# Patient Record
Sex: MALE | Race: WHITE | Employment: FULL TIME | URBAN - METROPOLITAN AREA
[De-identification: names, ages, dates, MRNs, and addresses within clinical notes are randomized per-mention and may not be internally consistent; named-entity substitution may affect disease eponyms.]

---

## 2022-01-11 ENCOUNTER — HOSPITAL ENCOUNTER (OUTPATIENT)
Dept: GENERAL RADIOLOGY | Age: 32
Discharge: HOME | End: 2022-01-13
Payer: COMMERCIAL

## 2022-01-11 ENCOUNTER — HOSPITAL ENCOUNTER (OUTPATIENT)
Age: 32
Discharge: HOME | End: 2022-01-13
Payer: COMMERCIAL

## 2022-01-11 DIAGNOSIS — R07.9 CHEST PAIN, UNSPECIFIED TYPE: ICD-10-CM

## 2022-01-11 PROCEDURE — 71046 X-RAY EXAM CHEST 2 VIEWS: CPT

## 2022-01-25 ENCOUNTER — HOSPITAL ENCOUNTER (EMERGENCY)
Age: 32
Discharge: HOME | End: 2022-01-25
Payer: COMMERCIAL

## 2022-01-25 ENCOUNTER — APPOINTMENT (OUTPATIENT)
Dept: CT IMAGING | Age: 32
End: 2022-01-25
Payer: COMMERCIAL

## 2022-01-25 ENCOUNTER — APPOINTMENT (OUTPATIENT)
Dept: ULTRASOUND IMAGING | Age: 32
End: 2022-01-25
Payer: COMMERCIAL

## 2022-01-25 VITALS
SYSTOLIC BLOOD PRESSURE: 144 MMHG | WEIGHT: 287 LBS | HEIGHT: 74 IN | OXYGEN SATURATION: 98 % | DIASTOLIC BLOOD PRESSURE: 92 MMHG | RESPIRATION RATE: 16 BRPM | TEMPERATURE: 97.5 F | BODY MASS INDEX: 36.83 KG/M2 | HEART RATE: 74 BPM

## 2022-01-25 DIAGNOSIS — R74.01 ELEVATED ALT MEASUREMENT: ICD-10-CM

## 2022-01-25 DIAGNOSIS — R07.81 RIB PAIN ON RIGHT SIDE: Primary | ICD-10-CM

## 2022-01-25 DIAGNOSIS — R03.0 ELEVATED BLOOD PRESSURE READING: ICD-10-CM

## 2022-01-25 DIAGNOSIS — K76.0 FATTY LIVER: ICD-10-CM

## 2022-01-25 DIAGNOSIS — R21 RASH AND OTHER NONSPECIFIC SKIN ERUPTION: ICD-10-CM

## 2022-01-25 LAB
ALBUMIN SERPL-MCNC: 4.4 G/DL (ref 3.5–5.2)
ALP BLD-CCNC: 106 U/L (ref 40–129)
ALT SERPL-CCNC: 44 U/L (ref 0–40)
ANION GAP SERPL CALCULATED.3IONS-SCNC: 11 MMOL/L (ref 7–16)
AST SERPL-CCNC: 23 U/L (ref 0–39)
BASOPHILS ABSOLUTE: 0.07 E9/L (ref 0–0.2)
BASOPHILS RELATIVE PERCENT: 0.7 % (ref 0–2)
BILIRUB SERPL-MCNC: 0.3 MG/DL (ref 0–1.2)
BILIRUBIN DIRECT: <0.2 MG/DL (ref 0–0.3)
BILIRUBIN URINE: NEGATIVE
BILIRUBIN, INDIRECT: ABNORMAL MG/DL (ref 0–1)
BLOOD, URINE: NEGATIVE
BUN BLDV-MCNC: 19 MG/DL (ref 6–20)
CALCIUM SERPL-MCNC: 9.3 MG/DL (ref 8.6–10.2)
CHLORIDE BLD-SCNC: 103 MMOL/L (ref 98–107)
CLARITY: CLEAR
CO2: 25 MMOL/L (ref 22–29)
COLOR: YELLOW
CREAT SERPL-MCNC: 0.8 MG/DL (ref 0.7–1.2)
D DIMER: <200 NG/ML DDU
EKG ATRIAL RATE: 76 BPM
EKG P AXIS: 68 DEGREES
EKG P-R INTERVAL: 156 MS
EKG Q-T INTERVAL: 356 MS
EKG QRS DURATION: 74 MS
EKG QTC CALCULATION (BAZETT): 400 MS
EKG R AXIS: 65 DEGREES
EKG T AXIS: 78 DEGREES
EKG VENTRICULAR RATE: 76 BPM
EOSINOPHILS ABSOLUTE: 0.15 E9/L (ref 0.05–0.5)
EOSINOPHILS RELATIVE PERCENT: 1.5 % (ref 0–6)
GFR AFRICAN AMERICAN: >60
GFR NON-AFRICAN AMERICAN: >60 ML/MIN/1.73
GLUCOSE BLD-MCNC: 89 MG/DL (ref 74–99)
GLUCOSE URINE: NEGATIVE MG/DL
HCT VFR BLD CALC: 51.1 % (ref 37–54)
HEMOGLOBIN: 17.2 G/DL (ref 12.5–16.5)
IMMATURE GRANULOCYTES #: 0.06 E9/L
IMMATURE GRANULOCYTES %: 0.6 % (ref 0–5)
KETONES, URINE: NEGATIVE MG/DL
LACTIC ACID: 1.1 MMOL/L (ref 0.5–2.2)
LEUKOCYTE ESTERASE, URINE: NEGATIVE
LIPASE: 31 U/L (ref 13–60)
LYMPHOCYTES ABSOLUTE: 3.74 E9/L (ref 1.5–4)
LYMPHOCYTES RELATIVE PERCENT: 37.3 % (ref 20–42)
MCH RBC QN AUTO: 30.7 PG (ref 26–35)
MCHC RBC AUTO-ENTMCNC: 33.7 % (ref 32–34.5)
MCV RBC AUTO: 91.1 FL (ref 80–99.9)
MONOCYTES ABSOLUTE: 0.81 E9/L (ref 0.1–0.95)
MONOCYTES RELATIVE PERCENT: 8.1 % (ref 2–12)
NEUTROPHILS ABSOLUTE: 5.2 E9/L (ref 1.8–7.3)
NEUTROPHILS RELATIVE PERCENT: 51.8 % (ref 43–80)
NITRITE, URINE: NEGATIVE
PDW BLD-RTO: 12.3 FL (ref 11.5–15)
PH UA: 5.5 (ref 5–9)
PLATELET # BLD: 257 E9/L (ref 130–450)
PMV BLD AUTO: 10.3 FL (ref 7–12)
POTASSIUM REFLEX MAGNESIUM: 4.3 MMOL/L (ref 3.5–5)
PRO-BNP: 7 PG/ML (ref 0–125)
PROTEIN UA: NEGATIVE MG/DL
RBC # BLD: 5.61 E12/L (ref 3.8–5.8)
REASON FOR REJECTION: NORMAL
REJECTED TEST: NORMAL
SODIUM BLD-SCNC: 139 MMOL/L (ref 132–146)
SPECIFIC GRAVITY UA: >=1.03 (ref 1–1.03)
TOTAL PROTEIN: 7 G/DL (ref 6.4–8.3)
TROPONIN, HIGH SENSITIVITY: <6 NG/L (ref 0–11)
UROBILINOGEN, URINE: 0.2 E.U./DL
WBC # BLD: 10 E9/L (ref 4.5–11.5)

## 2022-01-25 PROCEDURE — 83605 ASSAY OF LACTIC ACID: CPT

## 2022-01-25 PROCEDURE — 81003 URINALYSIS AUTO W/O SCOPE: CPT

## 2022-01-25 PROCEDURE — 76705 ECHO EXAM OF ABDOMEN: CPT

## 2022-01-25 PROCEDURE — 84484 ASSAY OF TROPONIN QUANT: CPT

## 2022-01-25 PROCEDURE — 93005 ELECTROCARDIOGRAM TRACING: CPT | Performed by: PHYSICIAN ASSISTANT

## 2022-01-25 PROCEDURE — 80076 HEPATIC FUNCTION PANEL: CPT

## 2022-01-25 PROCEDURE — 36415 COLL VENOUS BLD VENIPUNCTURE: CPT

## 2022-01-25 PROCEDURE — 85378 FIBRIN DEGRADE SEMIQUANT: CPT

## 2022-01-25 PROCEDURE — 83880 ASSAY OF NATRIURETIC PEPTIDE: CPT

## 2022-01-25 PROCEDURE — 80048 BASIC METABOLIC PNL TOTAL CA: CPT

## 2022-01-25 PROCEDURE — 83690 ASSAY OF LIPASE: CPT

## 2022-01-25 PROCEDURE — 99284 EMERGENCY DEPT VISIT MOD MDM: CPT

## 2022-01-25 PROCEDURE — 85025 COMPLETE CBC W/AUTO DIFF WBC: CPT

## 2022-01-25 PROCEDURE — 71250 CT THORAX DX C-: CPT

## 2022-01-25 RX ORDER — NAPROXEN 500 MG/1
500 TABLET ORAL 2 TIMES DAILY
Qty: 14 TABLET | Refills: 0 | Status: SHIPPED | OUTPATIENT
Start: 2022-01-25 | End: 2022-02-01

## 2022-01-25 RX ORDER — ALPRAZOLAM 0.5 MG/1
0.5 TABLET ORAL NIGHTLY PRN
COMMUNITY

## 2022-01-25 RX ORDER — DEXTROAMPHETAMINE SACCHARATE, AMPHETAMINE ASPARTATE, DEXTROAMPHETAMINE SULFATE AND AMPHETAMINE SULFATE 7.5; 7.5; 7.5; 7.5 MG/1; MG/1; MG/1; MG/1
30 TABLET ORAL DAILY
COMMUNITY

## 2022-01-25 ASSESSMENT — PAIN DESCRIPTION - LOCATION: LOCATION: RIB CAGE

## 2022-01-25 ASSESSMENT — PAIN DESCRIPTION - PAIN TYPE: TYPE: ACUTE PAIN

## 2022-01-25 ASSESSMENT — PAIN SCALES - GENERAL: PAINLEVEL_OUTOF10: 3

## 2022-01-25 ASSESSMENT — PAIN DESCRIPTION - ORIENTATION: ORIENTATION: RIGHT

## 2022-01-25 NOTE — ED PROVIDER NOTES
[facility]  Department of Emergency Medicine   ED  Encounter Note  Admit Date/RoomTime: 2022 11:08 AM  ED Room: 37/37    NAME: Teresa Elena  : 1990  MRN: [id number]     Chief Complaint:  Other (pain rt lateral rib cage worse with inspiration )    History of Present Illness        Teresa Elena is a 27 y.o. old male who presents to the emergency department by private vehicle, for non-traumatic aching, dull and sharp right lateral chest pain which occured 1 month(s) prior to arrival. The complaint occurred as a result of no known cause. He denies any fall or trauma. He states that 1 month ago he had some coughing and shortness of breath. He is not vaccinated against Covid and did not get tested for Covid at that time. He denies having Covid within the past 2 years. He states that since then the shortness of breath and cough have improved but now he has right-sided rib pain. He also notes a month ago he was possibly exposed to mold/hydrochloric acid inhalation at work but has not been exposed since then. He states he went to his family doctor who did a chest x-ray and told him that that was normal. He notes that he is here today because he is having right-sided rib pain that is both aching and sharp. He states that it hurts more at the end of the day. He denies any shortness of breath with this, hemoptysis, cough, fever, chills, or chest pain. Patient has no prior history of pain/injury with regards to today's visit. Since onset the symptoms have been remaining constant. His symptoms are associated with nothing additional at this time. His pain is aggraveated by movement, inspiration, and palpation and relieved by pressure on the area.   He denies any head injury, loss of consciousness, vision changes, eye pain, headache, neck pain, chest pain, SOB, hemoptysis, extremity injury or pains, numbness, weakness, fever, chills, cough, dyspnea, nausea, vomiting, diarrhea, constipation, dysuria, urinary frequency, urinary urgency, hematuria, or other sxs. Denies recent travel, surgery, calf pain or swelling, history of blood clots, personal history of cancer, or hormone replacement therapy. He notes that he is a smoker and smokes a pack a week. He also notes that he has asthma and uses an inhaler as needed but has not had to use it more recently. He denies high blood pressure, high cholesterol, diabetes, personal history of heart problems, or family history of heart problems. He denies a problem with his gallbladder. Patient also notes that he had a rash to the right side of his nose and now it is on the left side for one month. He states the one on the R nostril went away completely. He states that it is just one area in the shape of a pimple to the L nostril. He states that for both the pimple and the coughing his doctor has given him prednisone, mupirocin for the rash, and then on revisit to his physician he was given Valtrex as the doctor thought maybe it was shingles. He states that the cough and shortness of breath have improved and that the pimple went from the right side to now the left nostril. He states that it is slowly getting better. He states that it does hurt occasionally when you touch it but denies any rash anywhere else. He denies fever, chills, oral lesions, sore throat, ear pain, eye pain, or other symptoms. He denies any changes in medications or soaps or detergents. HISTORY: 0  EC  AGE: 1  RISK FACTORS: 1  TROPONIN: 0    TOTAL SCORE: 1    PERC Rule for PE for Age <50:      Age ?  50 negative     HR ? 100 negative     O2 Sat on Room Air < 95% negative     Prior History of DVT/PE negative     Recent Trauma or Surgery negative     Hemoptysis negative     Exogenous Estrogen/Hormone Use negative     Unilateral Extgremity Swelling negative     * If ANY Criteria are positive, the PERC rule is not satisfied and cannot be used to rule out PE in this patient. ROS   Pertinent positives and negatives are stated within HPI, all other systems reviewed and are negative. Past Medical History:  has a past medical history of Asthma. Surgical History:  has no past surgical history on file. Social History:  reports that he has been smoking. He has been smoking about 0.50 packs per day. He has never used smokeless tobacco. He reports that he does not drink alcohol and does not use drugs. Family History: family history is not on file. Allergies: Patient has no known allergies. Physical Exam   Oxygen Saturation Interpretation: Normal.        ED Triage Vitals [01/25/22 1007]   BP Temp Temp Source Pulse Resp SpO2 Height Weight   (!) 144/82 97.2 °F (36.2 °C) Temporal 96 14 97 % 6' 2\" (1.88 m) 287 lb (130.2 kg)       Vitals:    01/25/22 1007 01/25/22 1409   BP: (!) 144/82 (!) 144/92   Pulse: 96 74   Resp: 14 16   Temp: 97.2 °F (36.2 °C) 97.5 °F (36.4 °C)   TempSrc: Temporal Oral   SpO2: 97% 98%   Weight: 287 lb (130.2 kg)    Height: 6' 2\" (1.88 m)      Physical Exam  Constitutional:  Alert, development consistent with age. Head:  NC/NT. No maxillary or frontal sinus tenderness  Ears: external ear canals without erythema or swelling; TMs with good light reflex and no bleeding, bulging, or retractions bilaterally; no mastoid tenderness  Eyes: EOMI bilaterally; PERRLA; conjunctiva clear bilaterally  Nose: small, 3 mm papule to outer edge of L nostril without drainage, vesicle, erythema, increased warmth, swelling, fluctuance, crepitus; no other lesions or rash; normal nasal exam otherwise; no other abnormalities; no rhinorrhea; no bleeding  Throat: posterior pharynx without erythema; tonsils without erythema, swelling, or exudate; Airway patent; no oral lesions  Neck:  Normal ROM. Supple.  No cervical lymphadenopathy  Respiratory:  Clear to auscultation and breath sounds equal. No wheezes, rhonchi, stridor; not in respiratory distress  CV:  Regular rate and rhythm, normal heart sounds, without pathological murmurs, ectopy, gallops, or rubs. Chest:  right sided tender to palpation, which does reproduce pain. Crepitance: No.          Skin:  no wounds, erythema, or swelling. GI: Mild tenderness to palpation to the right upper quadrant; otherwise no tenderness to palpation in the abdomen; abdomen Soft, good bowel sounds. No firm or pulsatile mass. No rebound tenderness, guarding, rigidity  Integument:  Normal turgor. Warm, dry, without visible rash, unless noted elsewhere. Extremities: No tenderness or edema noted. No calf pain or swelling bilaterally  Neurological:  Oriented. Motor functions intact.     Lab / Imaging Results   (All laboratory and radiology results have been personally reviewed by myself)  Labs:  Results for orders placed or performed during the hospital encounter of 01/25/22   Troponin   Result Value Ref Range    Troponin, High Sensitivity <6 0 - 11 ng/L   CBC Auto Differential   Result Value Ref Range    WBC 10.0 4.5 - 11.5 E9/L    RBC 5.61 3.80 - 5.80 E12/L    Hemoglobin 17.2 (H) 12.5 - 16.5 g/dL    Hematocrit 51.1 37.0 - 54.0 %    MCV 91.1 80.0 - 99.9 fL    MCH 30.7 26.0 - 35.0 pg    MCHC 33.7 32.0 - 34.5 %    RDW 12.3 11.5 - 15.0 fL    Platelets 728 063 - 255 E9/L    MPV 10.3 7.0 - 12.0 fL    Neutrophils % 51.8 43.0 - 80.0 %    Immature Granulocytes % 0.6 0.0 - 5.0 %    Lymphocytes % 37.3 20.0 - 42.0 %    Monocytes % 8.1 2.0 - 12.0 %    Eosinophils % 1.5 0.0 - 6.0 %    Basophils % 0.7 0.0 - 2.0 %    Neutrophils Absolute 5.20 1.80 - 7.30 E9/L    Immature Granulocytes # 0.06 E9/L    Lymphocytes Absolute 3.74 1.50 - 4.00 E9/L    Monocytes Absolute 0.81 0.10 - 0.95 E9/L    Eosinophils Absolute 0.15 0.05 - 0.50 E9/L    Basophils Absolute 0.07 0.00 - 0.20 E9/L   Lipase   Result Value Ref Range    Lipase 31 13 - 60 U/L   Lactic Acid, Plasma   Result Value Ref Range    Lactic Acid 1.1 0.5 - 2.2 mmol/L   Basic Metabolic Panel w/ Reflex to MG Result Value Ref Range    Sodium 139 132 - 146 mmol/L    Potassium reflex Magnesium 4.3 3.5 - 5.0 mmol/L    Chloride 103 98 - 107 mmol/L    CO2 25 22 - 29 mmol/L    Anion Gap 11 7 - 16 mmol/L    Glucose 89 74 - 99 mg/dL    BUN 19 6 - 20 mg/dL    CREATININE 0.8 0.7 - 1.2 mg/dL    GFR Non-African American >60 >=60 mL/min/1.73    GFR African American >60     Calcium 9.3 8.6 - 10.2 mg/dL   Hepatic Function Panel   Result Value Ref Range    Total Protein 7.0 6.4 - 8.3 g/dL    Albumin 4.4 3.5 - 5.2 g/dL    Alkaline Phosphatase 106 40 - 129 U/L    ALT 44 (H) 0 - 40 U/L    AST 23 0 - 39 U/L    Total Bilirubin 0.3 0.0 - 1.2 mg/dL    Bilirubin, Direct <0.2 0.0 - 0.3 mg/dL    Bilirubin, Indirect see below 0.0 - 1.0 mg/dL   Urinalysis, reflex to microscopic   Result Value Ref Range    Color, UA Yellow Straw/Yellow    Clarity, UA Clear Clear    Glucose, Ur Negative Negative mg/dL    Bilirubin Urine Negative Negative    Ketones, Urine Negative Negative mg/dL    Specific Gravity, UA >=1.030 1.005 - 1.030    Blood, Urine Negative Negative    pH, UA 5.5 5.0 - 9.0    Protein, UA Negative Negative mg/dL    Urobilinogen, Urine 0.2 <2.0 E.U./dL    Nitrite, Urine Negative Negative    Leukocyte Esterase, Urine Negative Negative   Brain Natriuretic Peptide   Result Value Ref Range    Pro-BNP 7 0 - 125 pg/mL   SPECIMEN REJECTION   Result Value Ref Range    Rejected Test DIMER     Reason for Rejection see below    D-dimer, quantitative   Result Value Ref Range    D-Dimer, Quant <200 ng/mL DDU   EKG 12 Lead   Result Value Ref Range    Ventricular Rate 76 BPM    Atrial Rate 76 BPM    P-R Interval 156 ms    QRS Duration 74 ms    Q-T Interval 356 ms    QTc Calculation (Bazett) 400 ms    P Axis 68 degrees    R Axis 65 degrees    T Axis 78 degrees     Imaging: All Radiology results interpreted by Radiologist unless otherwise noted. US GALLBLADDER RUQ   Final Result   Hepatic steatosis with hepatomegaly.       Otherwise negative exam.         CT CHEST WO CONTRAST   Final Result   1. No pneumonia or pleural effusion. 2. Hepatic steatosis. EKG: This EKG is signed and interpreted by Dr. Austin Savage. Rate: 76  Rhythm: Sinus  Interpretation: no acute changes; QTc 400 ms  Comparison: no previous EKG available    ED Course / Medical Decision Making   Medications - No data to display    Consult(s):   None    Procedure(s):   none    MDM: Patient presents for right-sided rib pain that hurts with inspiration and palpation. It is reproducible. Had a cough and shortness of breath 4 weeks ago which have since cleared up. Did not get tested for Covid and is not vaccinated. States he inhaled possibly mold vs HCl acid 1 month ago at work but has not inhaled it since. Has asthma but no longer SOB. No fall or trauma. Also having some right upper quadrant pain. Labs and imaging ordered and reviewed above to rule out blood clot versus gallbladder problem. Ultrasound of the gallbladder within normal limits. Minimally elevated ALT with hepatomegaly. We will have him follow-up with his PCP for this. Troponin and EKG within normal limits. Heart score 1. Dimer less than 200. CAT scan of the chest negative. Possibly just musculoskeletal from either an injury with lifting at work or coughing frequently 1 month ago. Will send home with Naprosyn for this. Vitals are within normal limits. He is not short of breath or having any other symptoms besides the pain when you push on the chest.  Patient re-evaluated prior to discharge. Non-surgical abdomen upon re-examination. No guarding, rigidity, or rebound tenderness. Also has a pimple to the left nostril. States that it is getting better. Was treated with mupirocin, steroids, and Valtrex. No systemic symptoms. Vitals within normal limits. Does not appear to be shingles. Pt states had one on his R nostril and that went away and now on his L which is not consistent with shingles. No eye pain or eye involvement.  No other rash. Looks almost just like a pimple. Has had this for a month. States it is getting better. We will have him follow-up with a dermatologist for this as he states he does have a dermatologist to follow-up with. We will have him follow-up with his family doctor for the right-sided rib pain and dermatology for the papule. All questions answered. Patient agreeable with the plan. Patient is in no acute distress, non-toxic, and appropriate for outpatient management. Pt educated on reasons to return to the ER, including need to return for new or worsening symptoms. He did not want any pain meds in the ER. Plan of Care/Counseling:  EDMOND VORA PA-C reviewed today's visit with the patient in addition to providing specific details for the plan of care and counseling regarding the diagnosis and prognosis. Questions are answered at this time and are agreeable with the plan. Assessment     1. Rib pain on right side    2. Elevated ALT measurement    3. Fatty liver    4. Rash and other nonspecific skin eruption    5. Elevated blood pressure reading      Plan   Discharged home. Patient condition is good    New Medications     Discharge Medication List as of 1/25/2022  2:15 PM      START taking these medications    Details   naproxen (NAPROSYN) 500 MG tablet Take 1 tablet by mouth 2 times daily for 7 days, Disp-14 tablet, R-0Print           Electronically signed by Raf Biggs PA-C   DD: 1/25/22  **This report was transcribed using voice recognition software. Every effort was made to ensure accuracy; however, inadvertent computerized transcription errors may be present.   END OF ED PROVIDER NOTE       Hakan Cash PA-C  01/25/22 8375

## 2022-01-25 NOTE — Clinical Note
Jace Thao was seen and treated in our emergency department on 1/25/2022. He may return to work on 01/28/2022. If you have any questions or concerns, please don't hesitate to call.       Shahrzad Keene PA-C

## 2022-04-26 ENCOUNTER — HOSPITAL ENCOUNTER (OUTPATIENT)
Age: 32
Discharge: HOME | End: 2022-04-28
Payer: COMMERCIAL

## 2022-04-26 ENCOUNTER — HOSPITAL ENCOUNTER (OUTPATIENT)
Dept: GENERAL RADIOLOGY | Age: 32
Discharge: HOME | End: 2022-04-28
Payer: COMMERCIAL

## 2022-04-26 DIAGNOSIS — R07.9 CHEST PAIN, UNSPECIFIED TYPE: ICD-10-CM

## 2022-04-26 PROCEDURE — 71101 X-RAY EXAM UNILAT RIBS/CHEST: CPT

## 2023-02-02 ENCOUNTER — HOSPITAL ENCOUNTER (OUTPATIENT)
Dept: MRI IMAGING | Age: 33
Discharge: HOME | End: 2023-02-04
Payer: COMMERCIAL

## 2023-02-02 ENCOUNTER — HOSPITAL ENCOUNTER (OUTPATIENT)
Dept: GENERAL RADIOLOGY | Age: 33
Discharge: HOME | End: 2023-02-04
Payer: COMMERCIAL

## 2023-02-02 DIAGNOSIS — S05.40XA RETROBULBAR FOREIGN BODY, UNSPECIFIED LATERALITY: ICD-10-CM

## 2023-02-02 DIAGNOSIS — R42 DIZZINESS AND GIDDINESS: ICD-10-CM

## 2023-02-02 DIAGNOSIS — J06.9 ACUTE URI: ICD-10-CM

## 2023-02-02 DIAGNOSIS — R51.9 NONINTRACTABLE HEADACHE, UNSPECIFIED CHRONICITY PATTERN, UNSPECIFIED HEADACHE TYPE: ICD-10-CM

## 2023-02-02 PROCEDURE — 70030 X-RAY EYE FOR FOREIGN BODY: CPT

## 2023-02-02 PROCEDURE — 70551 MRI BRAIN STEM W/O DYE: CPT

## 2023-08-11 ENCOUNTER — HOSPITAL ENCOUNTER (OUTPATIENT)
Dept: GENERAL RADIOLOGY | Age: 33
Discharge: HOME | End: 2023-08-11
Payer: COMMERCIAL

## 2023-08-11 DIAGNOSIS — M79.671 RIGHT FOOT PAIN: ICD-10-CM

## 2023-08-11 PROCEDURE — 73630 X-RAY EXAM OF FOOT: CPT

## 2024-08-23 ENCOUNTER — APPOINTMENT (OUTPATIENT)
Dept: GENERAL RADIOLOGY | Age: 34
DRG: 511 | End: 2024-08-23
Payer: COMMERCIAL

## 2024-08-23 ENCOUNTER — ANESTHESIA (OUTPATIENT)
Dept: OPERATING ROOM | Age: 34
End: 2024-08-23
Payer: COMMERCIAL

## 2024-08-23 ENCOUNTER — ANESTHESIA EVENT (OUTPATIENT)
Dept: OPERATING ROOM | Age: 34
End: 2024-08-23
Payer: COMMERCIAL

## 2024-08-23 ENCOUNTER — APPOINTMENT (OUTPATIENT)
Dept: CT IMAGING | Age: 34
DRG: 511 | End: 2024-08-23
Payer: COMMERCIAL

## 2024-08-23 ENCOUNTER — HOSPITAL ENCOUNTER (INPATIENT)
Age: 34
LOS: 11 days | Discharge: HOME HEALTH | DRG: 511 | End: 2024-09-03
Attending: STUDENT IN AN ORGANIZED HEALTH CARE EDUCATION/TRAINING PROGRAM | Admitting: SURGERY
Payer: COMMERCIAL

## 2024-08-23 DIAGNOSIS — S52.501A CLOSED FRACTURE OF DISTAL END OF RIGHT RADIUS, UNSPECIFIED FRACTURE MORPHOLOGY, INITIAL ENCOUNTER: ICD-10-CM
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
## 2024-08-23 DIAGNOSIS — I87.8 POOR VENOUS ACCESS: ICD-10-CM

## 2024-08-23 DIAGNOSIS — S22.49XS CLOSED FRACTURE OF MULTIPLE RIBS, UNSPECIFIED LATERALITY, SEQUELA: ICD-10-CM

## 2024-08-23 DIAGNOSIS — S52.502C TYPE III OPEN FRACTURE OF DISTAL END OF LEFT RADIUS, UNSPECIFIED FRACTURE MORPHOLOGY, INITIAL ENCOUNTER: Primary | ICD-10-CM

## 2024-08-23 DIAGNOSIS — S42.401A CLOSED FRACTURE OF RIGHT ELBOW, INITIAL ENCOUNTER: ICD-10-CM
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
## 2024-08-23 PROBLEM — S52.021A CLOSED FRACTURE OF RIGHT OLECRANON PROCESS: Status: ACTIVE | Noted: 2024-08-23

## 2024-08-23 PROBLEM — S52.509B OPEN FRACTURE OF DISTAL END OF RADIUS: Status: ACTIVE | Noted: 2024-08-23

## 2024-08-23 LAB
AADO2: 372.2 MMHG
ABO + RH BLD: NORMAL
ALBUMIN SERPL-MCNC: 3.9 G/DL (ref 3.5–5.2)
ALP SERPL-CCNC: 110 U/L (ref 40–129)
ALT SERPL-CCNC: 839 U/L (ref 0–40)
ANION GAP SERPL CALCULATED.3IONS-SCNC: 15 MMOL/L (ref 7–16)
APAP SERPL-MCNC: <5 UG/ML (ref 10–30)
ARM BAND NUMBER: NORMAL
AST SERPL-CCNC: 880 U/L (ref 0–39)
B.E.: -2.8 MMOL/L (ref -3–3)
BASOPHILS # BLD: 0.06 K/UL (ref 0–0.2)
BASOPHILS NFR BLD: 1 % (ref 0–2)
BILIRUB SERPL-MCNC: 0.6 MG/DL (ref 0–1.2)
BLOOD BANK SAMPLE EXPIRATION: NORMAL
BLOOD GROUP ANTIBODIES SERPL: NEGATIVE
BUN SERPL-MCNC: 19 MG/DL (ref 6–20)
CALCIUM SERPL-MCNC: 8.9 MG/DL (ref 8.6–10.2)
CHLORIDE SERPL-SCNC: 107 MMOL/L (ref 98–107)
CO2 SERPL-SCNC: 20 MMOL/L (ref 22–29)
COHB: 0.3 % (ref 0–1.5)
CREAT SERPL-MCNC: 1.1 MG/DL (ref 0.7–1.2)
CRITICAL: ABNORMAL
DATE ANALYZED: ABNORMAL
DATE OF COLLECTION: ABNORMAL
EOSINOPHIL # BLD: 0.09 K/UL (ref 0.05–0.5)
EOSINOPHILS RELATIVE PERCENT: 1 % (ref 0–6)
ERYTHROCYTE [DISTWIDTH] IN BLOOD BY AUTOMATED COUNT: 12.4 % (ref 11.5–15)
ETHANOLAMINE SERPL-MCNC: <10 MG/DL (ref 0–0.08)
FIO2: 100 %
GFR, ESTIMATED: 89 ML/MIN/1.73M2
GLUCOSE SERPL-MCNC: 141 MG/DL (ref 74–99)
HCO3: 22.2 MMOL/L (ref 22–26)
HCT VFR BLD AUTO: 45.2 % (ref 37–54)
HGB BLD-MCNC: 15 G/DL (ref 12.5–16.5)
HHB: 0.6 % (ref 0–5)
IMM GRANULOCYTES # BLD AUTO: 0.1 K/UL (ref 0–0.58)
IMM GRANULOCYTES NFR BLD: 1 % (ref 0–5)
INR PPP: 1
LAB: ABNORMAL
LACTATE BLDV-SCNC: 2.8 MMOL/L (ref 0.5–2.2)
LYMPHOCYTES NFR BLD: 2.74 K/UL (ref 1.5–4)
LYMPHOCYTES RELATIVE PERCENT: 29 % (ref 20–42)
Lab: 2100
MCH RBC QN AUTO: 29.9 PG (ref 26–35)
MCHC RBC AUTO-ENTMCNC: 33.2 G/DL (ref 32–34.5)
MCV RBC AUTO: 90 FL (ref 80–99.9)
METHB: 0.4 % (ref 0–1.5)
MODE: ABNORMAL
MONOCYTES NFR BLD: 0.4 K/UL (ref 0.1–0.95)
MONOCYTES NFR BLD: 4 % (ref 2–12)
NEUTROPHILS NFR BLD: 65 % (ref 43–80)
NEUTS SEG NFR BLD: 6.18 K/UL (ref 1.8–7.3)
O2 SATURATION: 99.4 % (ref 92–98.5)
O2HB: 98.7 % (ref 94–97)
OPERATOR ID: 8214
PARTIAL THROMBOPLASTIN TIME: 21.6 SEC (ref 24.5–35.1)
PATIENT TEMP: 37 C
PCO2: 39.7 MMHG (ref 35–45)
PFO2: 2.76 MMHG/%
PH BLOOD GAS: 7.37 (ref 7.35–7.45)
PLATELET # BLD AUTO: 280 K/UL (ref 130–450)
PMV BLD AUTO: 10.8 FL (ref 7–12)
PO2: 276.1 MMHG (ref 75–100)
POTASSIUM SERPL-SCNC: 4.24 MMOL/L (ref 3.5–5)
POTASSIUM SERPL-SCNC: 4.4 MMOL/L (ref 3.5–5)
PROT SERPL-MCNC: 6.7 G/DL (ref 6.4–8.3)
PROTHROMBIN TIME: 10.4 SEC (ref 9.3–12.4)
RBC # BLD AUTO: 5.02 M/UL (ref 3.8–5.8)
RI(T): 1.35
SALICYLATES SERPL-MCNC: <0.3 MG/DL (ref 0–30)
SODIUM SERPL-SCNC: 142 MMOL/L (ref 132–146)
SOURCE, BLOOD GAS: ABNORMAL
THB: 16 G/DL (ref 11.5–16.5)
TIME ANALYZED: 2101
TOXIC TRICYCLIC SC,BLOOD: NEGATIVE
WBC OTHER # BLD: 9.6 K/UL (ref 4.5–11.5)

## 2024-08-23 PROCEDURE — 71260 CT THORAX DX C+: CPT

## 2024-08-23 PROCEDURE — 36620 INSERTION CATHETER ARTERY: CPT | Performed by: SURGERY

## 2024-08-23 PROCEDURE — 02HV33Z INSERTION OF INFUSION DEVICE INTO SUPERIOR VENA CAVA, PERCUTANEOUS APPROACH: ICD-10-PCS | Performed by: SURGERY

## 2024-08-23 PROCEDURE — 20690 APPL UNIPLN UNI EXT FIXJ SYS: CPT | Performed by: STUDENT IN AN ORGANIZED HEALTH CARE EDUCATION/TRAINING PROGRAM

## 2024-08-23 PROCEDURE — 80179 DRUG ASSAY SALICYLATE: CPT

## 2024-08-23 PROCEDURE — 36556 INSERT NON-TUNNEL CV CATH: CPT | Performed by: SURGERY

## 2024-08-23 PROCEDURE — 73100 X-RAY EXAM OF WRIST: CPT

## 2024-08-23 PROCEDURE — 82805 BLOOD GASES W/O2 SATURATION: CPT

## 2024-08-23 PROCEDURE — 72125 CT NECK SPINE W/O DYE: CPT

## 2024-08-23 PROCEDURE — 3700000000 HC ANESTHESIA ATTENDED CARE: Performed by: STUDENT IN AN ORGANIZED HEALTH CARE EDUCATION/TRAINING PROGRAM

## 2024-08-23 PROCEDURE — 85025 COMPLETE CBC W/AUTO DIFF WBC: CPT

## 2024-08-23 PROCEDURE — 0PSJ35Z REPOSITION LEFT RADIUS WITH EXTERNAL FIXATION DEVICE, PERCUTANEOUS APPROACH: ICD-10-PCS | Performed by: STUDENT IN AN ORGANIZED HEALTH CARE EDUCATION/TRAINING PROGRAM

## 2024-08-23 PROCEDURE — 3600000014 HC SURGERY LEVEL 4 ADDTL 15MIN: Performed by: STUDENT IN AN ORGANIZED HEALTH CARE EDUCATION/TRAINING PROGRAM

## 2024-08-23 PROCEDURE — 86850 RBC ANTIBODY SCREEN: CPT

## 2024-08-23 PROCEDURE — 83605 ASSAY OF LACTIC ACID: CPT

## 2024-08-23 PROCEDURE — 36556 INSERT NON-TUNNEL CV CATH: CPT

## 2024-08-23 PROCEDURE — 3700000001 HC ADD 15 MINUTES (ANESTHESIA): Performed by: STUDENT IN AN ORGANIZED HEALTH CARE EDUCATION/TRAINING PROGRAM

## 2024-08-23 PROCEDURE — 7100000001 HC PACU RECOVERY - ADDTL 15 MIN: Performed by: STUDENT IN AN ORGANIZED HEALTH CARE EDUCATION/TRAINING PROGRAM

## 2024-08-23 PROCEDURE — 80053 COMPREHEN METABOLIC PANEL: CPT

## 2024-08-23 PROCEDURE — 7100000000 HC PACU RECOVERY - FIRST 15 MIN: Performed by: STUDENT IN AN ORGANIZED HEALTH CARE EDUCATION/TRAINING PROGRAM

## 2024-08-23 PROCEDURE — 99222 1ST HOSP IP/OBS MODERATE 55: CPT | Performed by: STUDENT IN AN ORGANIZED HEALTH CARE EDUCATION/TRAINING PROGRAM

## 2024-08-23 PROCEDURE — 2580000003 HC RX 258

## 2024-08-23 PROCEDURE — 86900 BLOOD TYPING SEROLOGIC ABO: CPT

## 2024-08-23 PROCEDURE — 73090 X-RAY EXAM OF FOREARM: CPT

## 2024-08-23 PROCEDURE — 72170 X-RAY EXAM OF PELVIS: CPT

## 2024-08-23 PROCEDURE — 71045 X-RAY EXAM CHEST 1 VIEW: CPT

## 2024-08-23 PROCEDURE — 6360000002 HC RX W HCPCS: Performed by: STUDENT IN AN ORGANIZED HEALTH CARE EDUCATION/TRAINING PROGRAM

## 2024-08-23 PROCEDURE — 2060000000 HC ICU INTERMEDIATE R&B

## 2024-08-23 PROCEDURE — 0PBH0ZZ EXCISION OF RIGHT RADIUS, OPEN APPROACH: ICD-10-PCS | Performed by: STUDENT IN AN ORGANIZED HEALTH CARE EDUCATION/TRAINING PROGRAM

## 2024-08-23 PROCEDURE — G0480 DRUG TEST DEF 1-7 CLASSES: HCPCS

## 2024-08-23 PROCEDURE — 84132 ASSAY OF SERUM POTASSIUM: CPT

## 2024-08-23 PROCEDURE — 70450 CT HEAD/BRAIN W/O DYE: CPT

## 2024-08-23 PROCEDURE — 6810039000 HC L1 TRAUMA ALERT

## 2024-08-23 PROCEDURE — 85730 THROMBOPLASTIN TIME PARTIAL: CPT

## 2024-08-23 PROCEDURE — 86901 BLOOD TYPING SEROLOGIC RH(D): CPT

## 2024-08-23 PROCEDURE — 74177 CT ABD & PELVIS W/CONTRAST: CPT

## 2024-08-23 PROCEDURE — 99223 1ST HOSP IP/OBS HIGH 75: CPT | Performed by: SURGERY

## 2024-08-23 PROCEDURE — 6360000002 HC RX W HCPCS

## 2024-08-23 PROCEDURE — 0PBJ0ZZ EXCISION OF LEFT RADIUS, OPEN APPROACH: ICD-10-PCS | Performed by: STUDENT IN AN ORGANIZED HEALTH CARE EDUCATION/TRAINING PROGRAM

## 2024-08-23 PROCEDURE — 11012 DEB SKIN BONE AT FX SITE: CPT | Performed by: STUDENT IN AN ORGANIZED HEALTH CARE EDUCATION/TRAINING PROGRAM

## 2024-08-23 PROCEDURE — 80143 DRUG ASSAY ACETAMINOPHEN: CPT

## 2024-08-23 PROCEDURE — 80307 DRUG TEST PRSMV CHEM ANLYZR: CPT

## 2024-08-23 PROCEDURE — 99285 EMERGENCY DEPT VISIT HI MDM: CPT

## 2024-08-23 PROCEDURE — 2500000003 HC RX 250 WO HCPCS: Performed by: STUDENT IN AN ORGANIZED HEALTH CARE EDUCATION/TRAINING PROGRAM

## 2024-08-23 PROCEDURE — 2W3CX1Z IMMOBILIZATION OF RIGHT LOWER ARM USING SPLINT: ICD-10-PCS | Performed by: STUDENT IN AN ORGANIZED HEALTH CARE EDUCATION/TRAINING PROGRAM

## 2024-08-23 PROCEDURE — 2720000010 HC SURG SUPPLY STERILE: Performed by: STUDENT IN AN ORGANIZED HEALTH CARE EDUCATION/TRAINING PROGRAM

## 2024-08-23 PROCEDURE — 85610 PROTHROMBIN TIME: CPT

## 2024-08-23 PROCEDURE — 6360000004 HC RX CONTRAST MEDICATION: Performed by: RADIOLOGY

## 2024-08-23 PROCEDURE — 2709999900 HC NON-CHARGEABLE SUPPLY: Performed by: STUDENT IN AN ORGANIZED HEALTH CARE EDUCATION/TRAINING PROGRAM

## 2024-08-23 PROCEDURE — 2500000003 HC RX 250 WO HCPCS

## 2024-08-23 PROCEDURE — 3600000004 HC SURGERY LEVEL 4 BASE: Performed by: STUDENT IN AN ORGANIZED HEALTH CARE EDUCATION/TRAINING PROGRAM

## 2024-08-23 RX ORDER — METHOCARBAMOL 500 MG/1
1000 TABLET, FILM COATED ORAL 4 TIMES DAILY
Status: DISCONTINUED | OUTPATIENT
Start: 2024-08-23 | End: 2024-09-03 | Stop reason: HOSPADM

## 2024-08-23 RX ORDER — SUCCINYLCHOLINE/SOD CL,ISO/PF 200MG/10ML
SYRINGE (ML) INTRAVENOUS PRN
Status: DISCONTINUED | OUTPATIENT
Start: 2024-08-23 | End: 2024-08-24 | Stop reason: SDUPTHER

## 2024-08-23 RX ORDER — SODIUM CHLORIDE 9 MG/ML
INJECTION, SOLUTION INTRAVENOUS CONTINUOUS
Status: DISCONTINUED | OUTPATIENT
Start: 2024-08-23 | End: 2024-08-24

## 2024-08-23 RX ORDER — SODIUM CHLORIDE 0.9 % (FLUSH) 0.9 %
10 SYRINGE (ML) INJECTION PRN
Status: DISCONTINUED | OUTPATIENT
Start: 2024-08-23 | End: 2024-09-03 | Stop reason: HOSPADM

## 2024-08-23 RX ORDER — SODIUM CHLORIDE, SODIUM LACTATE, POTASSIUM CHLORIDE, CALCIUM CHLORIDE 600; 310; 30; 20 MG/100ML; MG/100ML; MG/100ML; MG/100ML
INJECTION, SOLUTION INTRAVENOUS CONTINUOUS
Status: DISCONTINUED | OUTPATIENT
Start: 2024-08-23 | End: 2024-08-25

## 2024-08-23 RX ORDER — SODIUM CHLORIDE 9 MG/ML
INJECTION, SOLUTION INTRAVENOUS PRN
Status: DISCONTINUED | OUTPATIENT
Start: 2024-08-23 | End: 2024-09-01

## 2024-08-23 RX ORDER — SODIUM CHLORIDE, SODIUM LACTATE, POTASSIUM CHLORIDE, CALCIUM CHLORIDE 600; 310; 30; 20 MG/100ML; MG/100ML; MG/100ML; MG/100ML
INJECTION, SOLUTION INTRAVENOUS CONTINUOUS PRN
Status: DISCONTINUED | OUTPATIENT
Start: 2024-08-23 | End: 2024-08-24 | Stop reason: SDUPTHER

## 2024-08-23 RX ORDER — MIDAZOLAM HYDROCHLORIDE 1 MG/ML
INJECTION INTRAMUSCULAR; INTRAVENOUS PRN
Status: DISCONTINUED | OUTPATIENT
Start: 2024-08-23 | End: 2024-08-24 | Stop reason: SDUPTHER

## 2024-08-23 RX ORDER — CEFAZOLIN SODIUM 1 G/3ML
INJECTION, POWDER, FOR SOLUTION INTRAMUSCULAR; INTRAVENOUS PRN
Status: DISCONTINUED | OUTPATIENT
Start: 2024-08-23 | End: 2024-08-24 | Stop reason: SDUPTHER

## 2024-08-23 RX ORDER — LIDOCAINE HYDROCHLORIDE 20 MG/ML
INJECTION, SOLUTION INTRAVENOUS PRN
Status: DISCONTINUED | OUTPATIENT
Start: 2024-08-23 | End: 2024-08-24 | Stop reason: SDUPTHER

## 2024-08-23 RX ORDER — OXYCODONE HYDROCHLORIDE 10 MG/1
10 TABLET ORAL EVERY 4 HOURS PRN
Status: DISCONTINUED | OUTPATIENT
Start: 2024-08-23 | End: 2024-08-24

## 2024-08-23 RX ORDER — ONDANSETRON 4 MG/1
4 TABLET, ORALLY DISINTEGRATING ORAL EVERY 8 HOURS PRN
Status: DISCONTINUED | OUTPATIENT
Start: 2024-08-23 | End: 2024-08-24

## 2024-08-23 RX ORDER — ROCURONIUM BROMIDE 10 MG/ML
INJECTION, SOLUTION INTRAVENOUS PRN
Status: DISCONTINUED | OUTPATIENT
Start: 2024-08-23 | End: 2024-08-24 | Stop reason: SDUPTHER

## 2024-08-23 RX ORDER — MORPHINE SULFATE 2 MG/ML
2 INJECTION, SOLUTION INTRAMUSCULAR; INTRAVENOUS
Status: DISCONTINUED | OUTPATIENT
Start: 2024-08-23 | End: 2024-08-23

## 2024-08-23 RX ORDER — OXYCODONE HYDROCHLORIDE 5 MG/1
5 TABLET ORAL EVERY 4 HOURS PRN
Status: DISCONTINUED | OUTPATIENT
Start: 2024-08-23 | End: 2024-08-24

## 2024-08-23 RX ORDER — PROPOFOL 10 MG/ML
INJECTION, EMULSION INTRAVENOUS PRN
Status: DISCONTINUED | OUTPATIENT
Start: 2024-08-23 | End: 2024-08-24 | Stop reason: SDUPTHER

## 2024-08-23 RX ORDER — ONDANSETRON 2 MG/ML
4 INJECTION INTRAMUSCULAR; INTRAVENOUS EVERY 6 HOURS PRN
Status: DISCONTINUED | OUTPATIENT
Start: 2024-08-23 | End: 2024-08-23

## 2024-08-23 RX ORDER — IOPAMIDOL 755 MG/ML
75 INJECTION, SOLUTION INTRAVASCULAR
Status: COMPLETED | OUTPATIENT
Start: 2024-08-23 | End: 2024-08-23

## 2024-08-23 RX ORDER — ONDANSETRON 2 MG/ML
4 INJECTION INTRAMUSCULAR; INTRAVENOUS EVERY 6 HOURS PRN
Status: DISCONTINUED | OUTPATIENT
Start: 2024-08-23 | End: 2024-08-24

## 2024-08-23 RX ORDER — PHENYLEPHRINE HCL IN 0.9% NACL 1 MG/10 ML
SYRINGE (ML) INTRAVENOUS PRN
Status: DISCONTINUED | OUTPATIENT
Start: 2024-08-23 | End: 2024-08-24 | Stop reason: SDUPTHER

## 2024-08-23 RX ORDER — POLYETHYLENE GLYCOL 3350 17 G/17G
17 POWDER, FOR SOLUTION ORAL DAILY
Status: DISCONTINUED | OUTPATIENT
Start: 2024-08-24 | End: 2024-09-03 | Stop reason: HOSPADM

## 2024-08-23 RX ORDER — SODIUM CHLORIDE 0.9 % (FLUSH) 0.9 %
10 SYRINGE (ML) INJECTION EVERY 12 HOURS SCHEDULED
Status: DISCONTINUED | OUTPATIENT
Start: 2024-08-23 | End: 2024-08-28 | Stop reason: SDUPTHER

## 2024-08-23 RX ORDER — FENTANYL CITRATE 50 UG/ML
INJECTION, SOLUTION INTRAMUSCULAR; INTRAVENOUS PRN
Status: DISCONTINUED | OUTPATIENT
Start: 2024-08-23 | End: 2024-08-24 | Stop reason: SDUPTHER

## 2024-08-23 RX ORDER — DEXAMETHASONE SODIUM PHOSPHATE 10 MG/ML
INJECTION INTRAMUSCULAR; INTRAVENOUS PRN
Status: DISCONTINUED | OUTPATIENT
Start: 2024-08-23 | End: 2024-08-24 | Stop reason: SDUPTHER

## 2024-08-23 RX ADMIN — PROPOFOL 200 MG: 10 INJECTION, EMULSION INTRAVENOUS at 23:06

## 2024-08-23 RX ADMIN — SODIUM CHLORIDE, POTASSIUM CHLORIDE, SODIUM LACTATE AND CALCIUM CHLORIDE: 600; 310; 30; 20 INJECTION, SOLUTION INTRAVENOUS at 23:54

## 2024-08-23 RX ADMIN — Medication 180 MG: at 23:06

## 2024-08-23 RX ADMIN — Medication 200 MCG: at 23:24

## 2024-08-23 RX ADMIN — IOPAMIDOL 75 ML: 755 INJECTION, SOLUTION INTRAVENOUS at 22:23

## 2024-08-23 RX ADMIN — Medication 200 MCG: at 23:28

## 2024-08-23 RX ADMIN — PIPERACILLIN SODIUM AND TAZOBACTAM SODIUM 4500 MG: 4; .5 INJECTION, POWDER, LYOPHILIZED, FOR SOLUTION INTRAVENOUS at 22:36

## 2024-08-23 RX ADMIN — CEFAZOLIN 3 G: 1 INJECTION, POWDER, FOR SOLUTION INTRAMUSCULAR; INTRAVENOUS at 23:13

## 2024-08-23 RX ADMIN — ROCURONIUM BROMIDE 50 MG: 10 INJECTION, SOLUTION INTRAVENOUS at 23:41

## 2024-08-23 RX ADMIN — FENTANYL CITRATE 50 MCG: 0.05 INJECTION, SOLUTION INTRAMUSCULAR; INTRAVENOUS at 23:06

## 2024-08-23 RX ADMIN — Medication 160 MG: at 22:36

## 2024-08-23 RX ADMIN — Medication 200 MCG: at 23:49

## 2024-08-23 RX ADMIN — PROPOFOL 50 MG: 10 INJECTION, EMULSION INTRAVENOUS at 23:31

## 2024-08-23 RX ADMIN — MIDAZOLAM 2 MG: 1 INJECTION INTRAMUSCULAR; INTRAVENOUS at 22:55

## 2024-08-23 RX ADMIN — Medication 100 MCG: at 23:16

## 2024-08-23 RX ADMIN — FENTANYL CITRATE 50 MCG: 0.05 INJECTION, SOLUTION INTRAMUSCULAR; INTRAVENOUS at 23:21

## 2024-08-23 RX ADMIN — Medication 200 MCG: at 23:37

## 2024-08-23 RX ADMIN — PROPOFOL 60 MG: 10 INJECTION, EMULSION INTRAVENOUS at 22:37

## 2024-08-23 RX ADMIN — DEXAMETHASONE SODIUM PHOSPHATE 10 MG: 10 INJECTION INTRAMUSCULAR; INTRAVENOUS at 23:14

## 2024-08-23 RX ADMIN — SODIUM CHLORIDE, POTASSIUM CHLORIDE, SODIUM LACTATE AND CALCIUM CHLORIDE: 600; 310; 30; 20 INJECTION, SOLUTION INTRAVENOUS at 22:52

## 2024-08-23 RX ADMIN — LIDOCAINE HYDROCHLORIDE 100 MG: 20 INJECTION, SOLUTION INTRAVENOUS at 23:06

## 2024-08-23 RX ADMIN — ROCURONIUM BROMIDE 50 MG: 10 INJECTION, SOLUTION INTRAVENOUS at 23:14

## 2024-08-23 RX ADMIN — PROPOFOL 50 MG: 10 INJECTION, EMULSION INTRAVENOUS at 23:17

## 2024-08-23 ASSESSMENT — LIFESTYLE VARIABLES: SMOKING_STATUS: 1

## 2024-08-24 ENCOUNTER — APPOINTMENT (OUTPATIENT)
Dept: GENERAL RADIOLOGY | Age: 34
DRG: 511 | End: 2024-08-24
Payer: COMMERCIAL

## 2024-08-24 ENCOUNTER — APPOINTMENT (OUTPATIENT)
Dept: CT IMAGING | Age: 34
DRG: 511 | End: 2024-08-24
Attending: STUDENT IN AN ORGANIZED HEALTH CARE EDUCATION/TRAINING PROGRAM
Payer: COMMERCIAL

## 2024-08-24 PROBLEM — S22.43XA CLOSED FRACTURE OF MULTIPLE RIBS OF BOTH SIDES: Status: ACTIVE | Noted: 2024-08-24

## 2024-08-24 LAB
ALBUMIN SERPL-MCNC: 3.6 G/DL (ref 3.5–5.2)
ALP SERPL-CCNC: 80 U/L (ref 40–129)
ALT SERPL-CCNC: 970 U/L (ref 0–40)
ANION GAP SERPL CALCULATED.3IONS-SCNC: 12 MMOL/L (ref 7–16)
AST SERPL-CCNC: 1005 U/L (ref 0–39)
BASOPHILS # BLD: 0.03 K/UL (ref 0–0.2)
BASOPHILS NFR BLD: 0 % (ref 0–2)
BILIRUB SERPL-MCNC: 0.6 MG/DL (ref 0–1.2)
BUN SERPL-MCNC: 20 MG/DL (ref 6–20)
CALCIUM SERPL-MCNC: 8.6 MG/DL (ref 8.6–10.2)
CHLORIDE SERPL-SCNC: 106 MMOL/L (ref 98–107)
CO2 SERPL-SCNC: 23 MMOL/L (ref 22–29)
CREAT SERPL-MCNC: 0.9 MG/DL (ref 0.7–1.2)
EOSINOPHIL # BLD: 0 K/UL (ref 0.05–0.5)
EOSINOPHILS RELATIVE PERCENT: 0 % (ref 0–6)
ERYTHROCYTE [DISTWIDTH] IN BLOOD BY AUTOMATED COUNT: 12.7 % (ref 11.5–15)
GFR, ESTIMATED: >90 ML/MIN/1.73M2
GLUCOSE SERPL-MCNC: 154 MG/DL (ref 74–99)
HCT VFR BLD AUTO: 41.3 % (ref 37–54)
HGB BLD-MCNC: 14 G/DL (ref 12.5–16.5)
IMM GRANULOCYTES # BLD AUTO: 0.12 K/UL (ref 0–0.58)
IMM GRANULOCYTES NFR BLD: 1 % (ref 0–5)
LYMPHOCYTES NFR BLD: 0.72 K/UL (ref 1.5–4)
LYMPHOCYTES RELATIVE PERCENT: 4 % (ref 20–42)
MCH RBC QN AUTO: 31.9 PG (ref 26–35)
MCHC RBC AUTO-ENTMCNC: 33.9 G/DL (ref 32–34.5)
MCV RBC AUTO: 94.1 FL (ref 80–99.9)
MONOCYTES NFR BLD: 0.9 K/UL (ref 0.1–0.95)
MONOCYTES NFR BLD: 5 % (ref 2–12)
NEUTROPHILS NFR BLD: 90 % (ref 43–80)
NEUTS SEG NFR BLD: 16.17 K/UL (ref 1.8–7.3)
PLATELET # BLD AUTO: 217 K/UL (ref 130–450)
PMV BLD AUTO: 10.9 FL (ref 7–12)
POTASSIUM SERPL-SCNC: 4.6 MMOL/L (ref 3.5–5)
PROT SERPL-MCNC: 6.2 G/DL (ref 6.4–8.3)
RBC # BLD AUTO: 4.39 M/UL (ref 3.8–5.8)
SODIUM SERPL-SCNC: 141 MMOL/L (ref 132–146)
WBC OTHER # BLD: 17.9 K/UL (ref 4.5–11.5)

## 2024-08-24 PROCEDURE — 2500000003 HC RX 250 WO HCPCS: Performed by: ANESTHESIOLOGY

## 2024-08-24 PROCEDURE — 73070 X-RAY EXAM OF ELBOW: CPT

## 2024-08-24 PROCEDURE — 80053 COMPREHEN METABOLIC PANEL: CPT

## 2024-08-24 PROCEDURE — 36556 INSERT NON-TUNNEL CV CATH: CPT

## 2024-08-24 PROCEDURE — 6370000000 HC RX 637 (ALT 250 FOR IP): Performed by: STUDENT IN AN ORGANIZED HEALTH CARE EDUCATION/TRAINING PROGRAM

## 2024-08-24 PROCEDURE — 6360000002 HC RX W HCPCS

## 2024-08-24 PROCEDURE — 2060000000 HC ICU INTERMEDIATE R&B

## 2024-08-24 PROCEDURE — 97530 THERAPEUTIC ACTIVITIES: CPT

## 2024-08-24 PROCEDURE — 2580000003 HC RX 258: Performed by: STUDENT IN AN ORGANIZED HEALTH CARE EDUCATION/TRAINING PROGRAM

## 2024-08-24 PROCEDURE — 51701 INSERT BLADDER CATHETER: CPT

## 2024-08-24 PROCEDURE — 99233 SBSQ HOSP IP/OBS HIGH 50: CPT | Performed by: SURGERY

## 2024-08-24 PROCEDURE — 2500000003 HC RX 250 WO HCPCS

## 2024-08-24 PROCEDURE — 97535 SELF CARE MNGMENT TRAINING: CPT

## 2024-08-24 PROCEDURE — 2580000003 HC RX 258

## 2024-08-24 PROCEDURE — 51798 US URINE CAPACITY MEASURE: CPT

## 2024-08-24 PROCEDURE — 73110 X-RAY EXAM OF WRIST: CPT

## 2024-08-24 PROCEDURE — 85025 COMPLETE CBC W/AUTO DIFF WBC: CPT

## 2024-08-24 PROCEDURE — 97166 OT EVAL MOD COMPLEX 45 MIN: CPT

## 2024-08-24 PROCEDURE — 97161 PT EVAL LOW COMPLEX 20 MIN: CPT

## 2024-08-24 PROCEDURE — 73200 CT UPPER EXTREMITY W/O DYE: CPT

## 2024-08-24 PROCEDURE — 6360000002 HC RX W HCPCS: Performed by: STUDENT IN AN ORGANIZED HEALTH CARE EDUCATION/TRAINING PROGRAM

## 2024-08-24 RX ORDER — MORPHINE SULFATE 4 MG/ML
4 INJECTION, SOLUTION INTRAMUSCULAR; INTRAVENOUS
Status: DISCONTINUED | OUTPATIENT
Start: 2024-08-24 | End: 2024-09-03

## 2024-08-24 RX ORDER — OXYCODONE HYDROCHLORIDE 10 MG/1
10 TABLET ORAL EVERY 4 HOURS PRN
Status: DISCONTINUED | OUTPATIENT
Start: 2024-08-24 | End: 2024-08-26

## 2024-08-24 RX ORDER — DEXTROAMPHETAMINE SACCHARATE, AMPHETAMINE ASPARTATE, DEXTROAMPHETAMINE SULFATE AND AMPHETAMINE SULFATE 5; 5; 5; 5 MG/1; MG/1; MG/1; MG/1
20 TABLET ORAL 2 TIMES DAILY
Status: ON HOLD | COMMUNITY
End: 2024-08-25

## 2024-08-24 RX ORDER — ALBUTEROL SULFATE 0.83 MG/ML
2.5 SOLUTION RESPIRATORY (INHALATION) EVERY 6 HOURS PRN
COMMUNITY

## 2024-08-24 RX ORDER — HYDROMORPHONE HYDROCHLORIDE 1 MG/ML
0.25 INJECTION, SOLUTION INTRAMUSCULAR; INTRAVENOUS; SUBCUTANEOUS EVERY 5 MIN PRN
Status: DISCONTINUED | OUTPATIENT
Start: 2024-08-24 | End: 2024-08-24 | Stop reason: HOSPADM

## 2024-08-24 RX ORDER — ONDANSETRON 2 MG/ML
INJECTION INTRAMUSCULAR; INTRAVENOUS PRN
Status: DISCONTINUED | OUTPATIENT
Start: 2024-08-24 | End: 2024-08-24 | Stop reason: SDUPTHER

## 2024-08-24 RX ORDER — SODIUM CHLORIDE 0.9 % (FLUSH) 0.9 %
5-40 SYRINGE (ML) INJECTION EVERY 12 HOURS SCHEDULED
Status: DISCONTINUED | OUTPATIENT
Start: 2024-08-24 | End: 2024-09-03 | Stop reason: HOSPADM

## 2024-08-24 RX ORDER — SODIUM CHLORIDE 0.9 % (FLUSH) 0.9 %
5-40 SYRINGE (ML) INJECTION PRN
Status: DISCONTINUED | OUTPATIENT
Start: 2024-08-24 | End: 2024-09-03 | Stop reason: HOSPADM

## 2024-08-24 RX ORDER — SODIUM CHLORIDE 9 MG/ML
INJECTION, SOLUTION INTRAVENOUS PRN
Status: DISCONTINUED | OUTPATIENT
Start: 2024-08-24 | End: 2024-08-24 | Stop reason: HOSPADM

## 2024-08-24 RX ORDER — MORPHINE SULFATE 2 MG/ML
2 INJECTION, SOLUTION INTRAMUSCULAR; INTRAVENOUS
Status: DISCONTINUED | OUTPATIENT
Start: 2024-08-24 | End: 2024-09-03

## 2024-08-24 RX ORDER — ENOXAPARIN SODIUM 100 MG/ML
40 INJECTION SUBCUTANEOUS DAILY
Status: DISCONTINUED | OUTPATIENT
Start: 2024-08-24 | End: 2024-08-26

## 2024-08-24 RX ORDER — SODIUM CHLORIDE 0.9 % (FLUSH) 0.9 %
5-40 SYRINGE (ML) INJECTION EVERY 12 HOURS SCHEDULED
Status: DISCONTINUED | OUTPATIENT
Start: 2024-08-24 | End: 2024-08-24 | Stop reason: HOSPADM

## 2024-08-24 RX ORDER — SODIUM CHLORIDE 9 MG/ML
INJECTION, SOLUTION INTRAVENOUS PRN
Status: DISCONTINUED | OUTPATIENT
Start: 2024-08-24 | End: 2024-09-03 | Stop reason: HOSPADM

## 2024-08-24 RX ORDER — SODIUM CHLORIDE 0.9 % (FLUSH) 0.9 %
5-40 SYRINGE (ML) INJECTION PRN
Status: DISCONTINUED | OUTPATIENT
Start: 2024-08-24 | End: 2024-08-24 | Stop reason: HOSPADM

## 2024-08-24 RX ORDER — OXYCODONE HYDROCHLORIDE 5 MG/1
5 TABLET ORAL EVERY 4 HOURS PRN
Status: DISCONTINUED | OUTPATIENT
Start: 2024-08-24 | End: 2024-08-26

## 2024-08-24 RX ORDER — ONDANSETRON 2 MG/ML
4 INJECTION INTRAMUSCULAR; INTRAVENOUS EVERY 6 HOURS PRN
Status: DISCONTINUED | OUTPATIENT
Start: 2024-08-24 | End: 2024-09-03 | Stop reason: HOSPADM

## 2024-08-24 RX ORDER — PROCHLORPERAZINE EDISYLATE 5 MG/ML
5 INJECTION INTRAMUSCULAR; INTRAVENOUS
Status: DISCONTINUED | OUTPATIENT
Start: 2024-08-24 | End: 2024-08-24 | Stop reason: HOSPADM

## 2024-08-24 RX ORDER — ONDANSETRON 4 MG/1
4 TABLET, ORALLY DISINTEGRATING ORAL EVERY 8 HOURS PRN
Status: DISCONTINUED | OUTPATIENT
Start: 2024-08-24 | End: 2024-09-03 | Stop reason: HOSPADM

## 2024-08-24 RX ORDER — ACETAMINOPHEN 325 MG/1
650 TABLET ORAL EVERY 6 HOURS
Status: DISCONTINUED | OUTPATIENT
Start: 2024-08-24 | End: 2024-08-27

## 2024-08-24 RX ORDER — GABAPENTIN 600 MG/1
300 TABLET ORAL 3 TIMES DAILY
Status: DISCONTINUED | OUTPATIENT
Start: 2024-08-24 | End: 2024-08-26 | Stop reason: SDUPTHER

## 2024-08-24 RX ORDER — NALOXONE HYDROCHLORIDE 0.4 MG/ML
INJECTION, SOLUTION INTRAMUSCULAR; INTRAVENOUS; SUBCUTANEOUS PRN
Status: DISCONTINUED | OUTPATIENT
Start: 2024-08-24 | End: 2024-08-24 | Stop reason: HOSPADM

## 2024-08-24 RX ORDER — DUPILUMAB 300 MG/2ML
300 INJECTION, SOLUTION SUBCUTANEOUS ONCE
COMMUNITY

## 2024-08-24 RX ORDER — HYDROMORPHONE HYDROCHLORIDE 1 MG/ML
0.5 INJECTION, SOLUTION INTRAMUSCULAR; INTRAVENOUS; SUBCUTANEOUS EVERY 5 MIN PRN
Status: DISCONTINUED | OUTPATIENT
Start: 2024-08-24 | End: 2024-08-24 | Stop reason: HOSPADM

## 2024-08-24 RX ORDER — FLUTICASONE PROPIONATE AND SALMETEROL XINAFOATE 45; 21 UG/1; UG/1
2 AEROSOL, METERED RESPIRATORY (INHALATION) 2 TIMES DAILY
COMMUNITY

## 2024-08-24 RX ADMIN — ONDANSETRON 4 MG: 2 INJECTION INTRAMUSCULAR; INTRAVENOUS at 01:20

## 2024-08-24 RX ADMIN — HYDROMORPHONE HYDROCHLORIDE 0.5 MG: 1 INJECTION, SOLUTION INTRAMUSCULAR; INTRAVENOUS; SUBCUTANEOUS at 02:52

## 2024-08-24 RX ADMIN — OXYCODONE HYDROCHLORIDE 10 MG: 10 TABLET ORAL at 04:21

## 2024-08-24 RX ADMIN — Medication 100 MCG: at 00:44

## 2024-08-24 RX ADMIN — Medication 200 MCG: at 00:02

## 2024-08-24 RX ADMIN — SUGAMMADEX 400 MG: 100 INJECTION, SOLUTION INTRAVENOUS at 01:27

## 2024-08-24 RX ADMIN — FENTANYL CITRATE 50 MCG: 0.05 INJECTION, SOLUTION INTRAMUSCULAR; INTRAVENOUS at 00:05

## 2024-08-24 RX ADMIN — PIPERACILLIN AND TAZOBACTAM 3375 MG: 3; .375 INJECTION, POWDER, LYOPHILIZED, FOR SOLUTION INTRAVENOUS at 04:35

## 2024-08-24 RX ADMIN — GABAPENTIN 300 MG: 600 TABLET, COATED ORAL at 15:19

## 2024-08-24 RX ADMIN — ACETAMINOPHEN 650 MG: 325 TABLET ORAL at 03:29

## 2024-08-24 RX ADMIN — MORPHINE SULFATE 4 MG: 4 INJECTION, SOLUTION INTRAMUSCULAR; INTRAVENOUS at 05:38

## 2024-08-24 RX ADMIN — ACETAMINOPHEN 650 MG: 325 TABLET ORAL at 08:35

## 2024-08-24 RX ADMIN — POLYETHYLENE GLYCOL 3350 17 G: 17 POWDER, FOR SOLUTION ORAL at 08:36

## 2024-08-24 RX ADMIN — MORPHINE SULFATE 4 MG: 4 INJECTION, SOLUTION INTRAMUSCULAR; INTRAVENOUS at 18:18

## 2024-08-24 RX ADMIN — METHOCARBAMOL TABLETS 1000 MG: 500 TABLET, COATED ORAL at 03:29

## 2024-08-24 RX ADMIN — SODIUM CHLORIDE, POTASSIUM CHLORIDE, SODIUM LACTATE AND CALCIUM CHLORIDE: 600; 310; 30; 20 INJECTION, SOLUTION INTRAVENOUS at 18:21

## 2024-08-24 RX ADMIN — MORPHINE SULFATE 4 MG: 4 INJECTION, SOLUTION INTRAMUSCULAR; INTRAVENOUS at 15:19

## 2024-08-24 RX ADMIN — MORPHINE SULFATE 4 MG: 4 INJECTION, SOLUTION INTRAMUSCULAR; INTRAVENOUS at 12:34

## 2024-08-24 RX ADMIN — ENOXAPARIN SODIUM 40 MG: 100 INJECTION SUBCUTANEOUS at 08:36

## 2024-08-24 RX ADMIN — WATER 1000 MG: 1 INJECTION INTRAMUSCULAR; INTRAVENOUS; SUBCUTANEOUS at 08:33

## 2024-08-24 RX ADMIN — OXYCODONE HYDROCHLORIDE 10 MG: 10 TABLET ORAL at 20:55

## 2024-08-24 RX ADMIN — MORPHINE SULFATE 2 MG: 2 INJECTION, SOLUTION INTRAMUSCULAR; INTRAVENOUS at 22:18

## 2024-08-24 RX ADMIN — GABAPENTIN 300 MG: 600 TABLET, COATED ORAL at 08:35

## 2024-08-24 RX ADMIN — PIPERACILLIN AND TAZOBACTAM 3375 MG: 3; .375 INJECTION, POWDER, LYOPHILIZED, FOR SOLUTION INTRAVENOUS at 21:33

## 2024-08-24 RX ADMIN — ROCURONIUM BROMIDE 20 MG: 10 INJECTION, SOLUTION INTRAVENOUS at 00:43

## 2024-08-24 RX ADMIN — GABAPENTIN 300 MG: 600 TABLET, COATED ORAL at 21:22

## 2024-08-24 RX ADMIN — ACETAMINOPHEN 650 MG: 325 TABLET ORAL at 21:22

## 2024-08-24 RX ADMIN — SODIUM CHLORIDE, POTASSIUM CHLORIDE, SODIUM LACTATE AND CALCIUM CHLORIDE: 600; 310; 30; 20 INJECTION, SOLUTION INTRAVENOUS at 03:28

## 2024-08-24 RX ADMIN — METHOCARBAMOL TABLETS 1000 MG: 500 TABLET, COATED ORAL at 08:34

## 2024-08-24 RX ADMIN — METHOCARBAMOL TABLETS 1000 MG: 500 TABLET, COATED ORAL at 18:18

## 2024-08-24 RX ADMIN — METHOCARBAMOL TABLETS 1000 MG: 500 TABLET, COATED ORAL at 21:22

## 2024-08-24 RX ADMIN — SODIUM CHLORIDE, PRESERVATIVE FREE 10 ML: 5 INJECTION INTRAVENOUS at 08:36

## 2024-08-24 RX ADMIN — METHOCARBAMOL TABLETS 1000 MG: 500 TABLET, COATED ORAL at 13:16

## 2024-08-24 RX ADMIN — MORPHINE SULFATE 2 MG: 2 INJECTION, SOLUTION INTRAMUSCULAR; INTRAVENOUS at 08:36

## 2024-08-24 RX ADMIN — OXYCODONE HYDROCHLORIDE 10 MG: 10 TABLET ORAL at 10:26

## 2024-08-24 RX ADMIN — PIPERACILLIN AND TAZOBACTAM 3375 MG: 3; .375 INJECTION, POWDER, LYOPHILIZED, FOR SOLUTION INTRAVENOUS at 12:29

## 2024-08-24 ASSESSMENT — PAIN - FUNCTIONAL ASSESSMENT
PAIN_FUNCTIONAL_ASSESSMENT: PREVENTS OR INTERFERES SOME ACTIVE ACTIVITIES AND ADLS
PAIN_FUNCTIONAL_ASSESSMENT: ADULT NONVERBAL PAIN SCALE (NPVS)
PAIN_FUNCTIONAL_ASSESSMENT: PREVENTS OR INTERFERES SOME ACTIVE ACTIVITIES AND ADLS

## 2024-08-24 ASSESSMENT — PAIN DESCRIPTION - ORIENTATION
ORIENTATION: RIGHT;LEFT
ORIENTATION: LEFT
ORIENTATION: LEFT
ORIENTATION: LEFT;RIGHT
ORIENTATION: RIGHT;LEFT
ORIENTATION: RIGHT;LEFT
ORIENTATION: LEFT
ORIENTATION: RIGHT;LEFT;LOWER

## 2024-08-24 ASSESSMENT — PAIN DESCRIPTION - LOCATION
LOCATION: ARM
LOCATION: WRIST
LOCATION: ARM;CHEST
LOCATION: ARM
LOCATION: ARM;CHEST
LOCATION: ARM

## 2024-08-24 ASSESSMENT — PAIN DESCRIPTION - DESCRIPTORS
DESCRIPTORS: ACHING;DISCOMFORT;SORE;SHARP
DESCRIPTORS: ACHING;SORE
DESCRIPTORS: ACHING;DISCOMFORT;SORE
DESCRIPTORS: ACHING;DISCOMFORT;SORE;SHARP
DESCRIPTORS: ACHING;DISCOMFORT;SORE;SHARP
DESCRIPTORS: ACHING;DISCOMFORT;SORE
DESCRIPTORS: ACHING;SORE
DESCRIPTORS: ACHING;DISCOMFORT;SORE;SHARP
DESCRIPTORS: ACHING;DISCOMFORT;SORE;SHARP
DESCRIPTORS: ACHING;DISCOMFORT;SORE
DESCRIPTORS: SHARP;STABBING
DESCRIPTORS: ACHING;DISCOMFORT;THROBBING;TENDER
DESCRIPTORS: SHARP;STABBING

## 2024-08-24 ASSESSMENT — PAIN SCALES - GENERAL
PAINLEVEL_OUTOF10: 7
PAINLEVEL_OUTOF10: 9
PAINLEVEL_OUTOF10: 10
PAINLEVEL_OUTOF10: 7
PAINLEVEL_OUTOF10: 10
PAINLEVEL_OUTOF10: 9
PAINLEVEL_OUTOF10: 10
PAINLEVEL_OUTOF10: 10
PAINLEVEL_OUTOF10: 9
PAINLEVEL_OUTOF10: 10
PAINLEVEL_OUTOF10: 10

## 2024-08-24 ASSESSMENT — PAIN DESCRIPTION - PAIN TYPE
TYPE: ACUTE PAIN
TYPE: SURGICAL PAIN
TYPE: SURGICAL PAIN

## 2024-08-24 NOTE — OP NOTE
Operative Note      Patient: Cheikh Freedman  YOB: 1990  MRN: [id number]    Date of Procedure: 8/23/2024    Pre-Op Diagnosis Codes:   Left open grade 3 highly comminuted intra-articular distal radius fracture  Right open grade 2 intra-articular distal radius fracture  Closed right olecranon fracture    Post-Op Diagnosis: Same       Procedure(s):  Irrigation and debridement of left open grade 3 distal radius fracture with application of external fixator  Irrigation debridement of right open grade 2 distal radius fracture with closed reduction and splint application  Closed treatment of olecranon fracture.     Surgeon(s):  Mehdi Marinelli DO    Assistant:   Resident: Jeffrey Bain DO    Anesthesia: General    Estimated Blood Loss (mL): Minimal    Complications: None    Brief history:    Patient is a pleasant 34-year-old male who unfortunately suffered a fall from a ladder which resulted in a contaminated open bilateral distal radius fractures as well as closed right olecranon fracture.  He was evaluated in the [facility] ER as a trauma.  There he underwent sedation as well as closed reduction of his bilateral distal radius fractures and splint application with wet-to-dry dressings.  He also received the appropriate antibiotics per open fracture protocol.    After review of the imaging as well as physical exam and discussion with the patient ultimately determined that formal irrigation debridement in the operating room for the bilateral open distal radius as well as possible open reduction internal fixation versus external fixation would be most suitable for him.  He also understood the nature of likely having staged procedures to the high contamination and organic matter when he fell into his yard.    Risks and benefits were outlined as below    I have explained the risks and complications of the recommended surgery with the patient at length, as well as discussed potential treatment alternatives

## 2024-08-24 NOTE — ANESTHESIA POSTPROCEDURE EVALUATION
Department of Anesthesiology  Postprocedure Note    Patient: Cheikh Freedman  MRN: [id number]  YOB: 1990  Date of evaluation: 8/24/2024    Procedure Summary       Date: 08/23/24 Room / Location: [address]    Anesthesia Start: 2252 Anesthesia Stop: 08/24/24 0148    Procedure: I&D OF BILATERAL OPEN DISTAL RADIUS FRACTURES WITH CLOSED REDUCTION OF RIGHT DISTAL RADIUS FRACTURE AS WELL AS EXTERNAL FIXATION OF LEFT DISTAL RADIUS FRACTURE AND SPLINT APPLICATION OF RIGHT OLECRANON FRACTURE (Bilateral: Arm Lower) Diagnosis:       Type I or II open fracture of distal end of radius, unspecified fracture morphology, unspecified laterality, initial encounter      Closed fracture of olecranon process of right ulna, initial encounter      (Type I or II open fracture of distal end of radius, unspecified fracture morphology, unspecified laterality, initial encounter [S52.509B])      (Closed fracture of olecranon process of right ulna, initial encounter [S52.021A])    Surgeons: Mehdi Marinelli DO Responsible Provider: Eliza Zepeda MD    Anesthesia Type: General ASA Status: 3 - Emergent            Anesthesia Type: General    Ada Phase I: Ada Score: 9    Ada Phase II:      Anesthesia Post Evaluation    Patient location during evaluation: PACU  Patient participation: complete - patient participated  Level of consciousness: awake and alert  Pain score: 4  Airway patency: patent  Nausea & Vomiting: no vomiting and no nausea  Cardiovascular status: hemodynamically stable  Respiratory status: spontaneous ventilation, nonlabored ventilation and acceptable  Hydration status: stable  Pain management: adequate and satisfactory to patient        No notable events documented.

## 2024-08-24 NOTE — PROGRESS NOTES
[facility]  ATTENDING PHYSICIAN PROGRESS NOTE      I personally saw, examined and provided care for the patient. Radiographs, labs and medications were reviewed by me independently.  The case was discussed in detail and plans for care were established. Review of Residents documentation was conducted and revisions were made as appropriate. I agree with the above documented exam, problem list and plan of care.    The following summarizes my clinical findings and independent assessment.     CC: Fall from tree    S.  Patient underwent surgery with orthopedics yesterday    O.  Awake alert x3, GCS 15  No apparent distress  Heart regular rate rhythm  Lungs are clear bilaterally  Abdomen soft nontender nondistended  Extremity-Ex-Fix on the left, right upper extremity splinted and reduced    ASSESSMENT:  Principal Problem:    Fall from tree, initial encounter  Active Problems:    Poor venous access    Open fracture of distal end of radius    Closed fracture of right olecranon process    Closed fracture of multiple ribs of both sides  Resolved Problems:    * No resolved hospital problems. *       PLAN:  LABS:  -I have personally reviewed the patient's labs   CBC with Differential:    Lab Results   Component Value Date/Time    WBC 17.9 08/24/2024 05:15 AM    RBC 4.39 08/24/2024 05:15 AM    HGB 14.0 08/24/2024 05:15 AM    HCT 41.3 08/24/2024 05:15 AM     08/24/2024 05:15 AM    MCV 94.1 08/24/2024 05:15 AM    MCH 31.9 08/24/2024 05:15 AM    MCHC 33.9 08/24/2024 05:15 AM    RDW 12.7 08/24/2024 05:15 AM    LYMPHOPCT 4 08/24/2024 05:15 AM    MONOPCT 5 08/24/2024 05:15 AM    EOSPCT 0 08/24/2024 05:15 AM    BASOPCT 0 08/24/2024 05:15 AM    MONOSABS 0.90 08/24/2024 05:15 AM    LYMPHSABS 0.72 08/24/2024 05:15 AM    EOSABS 0.00 08/24/2024 05:15 AM    BASOSABS 0.03 08/24/2024 05:15 AM     CMP:    Lab Results   Component Value Date/Time     08/24/2024 05:15 AM    K 4.6 08/24/2024 05:15 AM      08/24/2024 05:15 AM    CO2 23 08/24/2024 05:15 AM    BUN 20 08/24/2024 05:15 AM    CREATININE 0.9 08/24/2024 05:15 AM    LABGLOM >90 08/24/2024 05:15 AM    GLUCOSE 154 08/24/2024 05:15 AM    CALCIUM 8.6 08/24/2024 05:15 AM    BILITOT 0.6 08/24/2024 05:15 AM    ALKPHOS 80 08/24/2024 05:15 AM    AST 1,005 08/24/2024 05:15 AM     08/24/2024 05:15 AM       -I have reviewed the progress note from orthopedics from 8/23    -Bilateral open distal radial fracture  Right closed olecranon fracture-status post Ex-Fix left upper extremity, status post closed reduction of right upper extremity  Nonweightbearing bilateral upper extremities  - Multiple bilateral rib fractures-needs aggressive pain control pulmonary toilet  - General Diet  -Acute Pain--I am managing the acute pain and prescription drug changes with multimodality pain control.  Continue parenteral controlled substances for breakthrough pain-IV Dilaudid as needed    DVT Proph: SCDS/Lovenox    - PT OT            Marvin Lima MD, FACS  8/24/2024  8:53 AM    NOTE: This report was transcribed using voice recognition software. Every effort was made to ensure accuracy; however, inadvertent computerized transcription errors may be present.

## 2024-08-24 NOTE — ANESTHESIA PRE PROCEDURE
Department of Anesthesiology  Preprocedure Note       Name:  Cheikh Freedman   Age:  34 y.o.  :  1990                                          MRN:  [id number]         Date:  2024      Surgeon: Surgeon(s):  Mehdi Marinelli DO    Procedure: Procedure(s):  OPEN BILATERAL RADIUS OPEN REDUCTION INTERNAL FIXATION, RIGHT OLECRENON OPEN REDUCTION INTERNAL FIXATION    Medications prior to admission:   Prior to Admission medications    Not on File       Current medications:    Current Facility-Administered Medications   Medication Dose Route Frequency Provider Last Rate Last Admin    0.9 % sodium chloride infusion   IntraVENous Continuous Jessica Ricci MD        lactated ringers IV soln infusion   IntraVENous Continuous Jessica Ricci MD        sodium chloride flush 0.9 % injection 10 mL  10 mL IntraVENous 2 times per day Jessica Ricci MD        sodium chloride flush 0.9 % injection 10 mL  10 mL IntraVENous PRN Jessica Ricci MD        0.9 % sodium chloride infusion   IntraVENous PRN Jessica Ricci MD        methocarbamol (ROBAXIN) tablet 1,000 mg  1,000 mg Oral 4x Daily Jessica Ricci MD        ondansetron (ZOFRAN-ODT) disintegrating tablet 4 mg  4 mg Oral Q8H PRN Jessica Ricci MD        Or    ondansetron (ZOFRAN) injection 4 mg  4 mg IntraVENous Q6H PRN Jessica Ricci MD        [START ON 2024] polyethylene glycol (GLYCOLAX) packet 17 g  17 g Oral Daily Jessica Ricci MD        [START ON 2024] piperacillin-tazobactam (ZOSYN) 3,375 mg in sodium chloride 0.9 % 50 mL IVPB ([id number])  3,375 mg IntraVENous Q8H Jessica Ricci MD        oxyCODONE (ROXICODONE) immediate release tablet 5 mg  5 mg Oral Q4H PRN Jessica Ricci MD        Or    oxyCODONE HCl (OXY-IR) immediate release tablet 10 mg  10 mg Oral Q4H PRN Jessica Ricci MD        HYDROmorphone (DILAUDID) injection 0.5 mg  0.5 mg IntraVENous Q3H PRN Jessica Ricci MD         No current outpatient medications on file.       Allergies:  No Known Allergies    Problem List:    Patient Active Problem List   Diagnosis Code    Fall from tree, initial

## 2024-08-24 NOTE — DISCHARGE SUMMARY
Physician Discharge Summary     Patient ID:  Cheikh Freedman  [id number]  34 y.o.  1990    Admit date: 8/23/2024    Discharge date and time: 9/3/24     Admitting Physician: Marvin Lima MD     Admission Diagnoses: Poor venous access [I87.8]  Fall from tree, initial encounter [W14.XXXA]    Discharge Diagnoses: Principal Problem:    Fall from tree, initial encounter  Active Problems:    Poor venous access    Open fracture of distal end of radius    Closed fracture of right elbow    Closed fracture of multiple ribs of both sides    Closed fracture of right distal radius    Closed fracture of multiple ribs  Resolved Problems:    * No resolved hospital problems. *      Admission Condition: fair    Discharged Condition: stable    Indication for Admission: fall from tree with polytrauma    Hospital Course/Procedures/Operation/treatments:   8/23:34 y.o. male   fall from 25ft in tree   Injury occurred Prior to arrival   was cutting branches when he fell onto his hands to break fall. + LOC   No other complaints  No thinners   10 of ketamine by EMS   8/24:Doing well postop day 1 from orthopedic surgery in the trauma of the bilateral open structures. Tolerating a regular diet. Minimal chest tenderness overlying the rib fractures. Otherwise passing gas without any bowel movements   8/26: OR with ortho today. SMI 2500  8/27: Elevated bilirubin. Fractioned ordered. OR with ortho today.  8/28: Washout yesterday with Ortho, planning definitive fixation 8/29. Continued hypertension. Pain controlled with medication, feels lidocaine patch helping greatly. Tolerating room air, SMI 2500. Elevated bilirubin yesterday.   8/29: NPO for OR with ortho today. Tolerating room air, SMI 2000. Blood pressure improving this am.   8/30: S/p OR with ortho 8/29. Tolerating room air. Blood pressure improved. Receiving IV normal saline overnight, discontinued this am.  8/31: doing well. Pod 1 from ortho. Plan for PMR to see  9/1: 16/24 with PT  9/2:  practice deep breathing.  It is advised to continue this several times a day while at home to prevent pneumonia.      Prevent Complications:  Try to take 5-10 deep breaths every hour while awake.  Use the incentive spirometer often.  Set goals of deeper breathing every couple of days until reaching normal adult level of about 2500 ml on the printed scale.    Activity:  Avoid further chest injury.  Plan quiet activity for the first few days.  Do not stay in bed.  Walk around and move.  No rough activities with family, friends or pets.  No sports, jumping, jogging or gymnastics.  Ask your doctor or the trauma clinic when you will be able to resume these activities.    Symptoms to Report:  Call your doctor right away if you notice any of these symptoms:   Increased chest pain  Shortness of breath  Fever  Coughing up blood    Call 911 immediately if these symptoms are severe.    Follow-up:  Trauma Clinic: [phone]--press option 2  [facility] [address]     Orthopedics  Weight bearing Status - Non-weight bearing - bilateral upper extremities  Pain medication Per Prescription  Ice and Elevate effected Extremity  Continue DVT Prophylaxis  Wound care - keep dressing clean dry and intact until seen in the office.   Fracture Care - nonweight bearing bilateral upper extremities  Follow Up in Office in 2 weeks with Dr. Lucero     Follow up:   [facility]  [address] [place] [address]  [phone]  Schedule an appointment as soon as possible for a visit in 1 week(s)      Tavon Lucero DO  [address] [address]  [phone]    Follow up in 2 week(s)      Yosi Alberts MD  [address] [id number] [phone]    Schedule an appointment as soon as possible for a visit in 2 week(s)         Signed:  Leola Steele MD  9/3/2024  5:07 PM

## 2024-08-24 NOTE — ED PROVIDER NOTES
34-year-old male presents the emergency department as a trauma alert after falling from 25 feet while cutting branches.  He fell breaking his fall with outstretched hands he states that he lost consciousness.  He has no other complaints.      Review of Systems   Respiratory:  Negative for shortness of breath.    Cardiovascular:  Negative for chest pain.   Gastrointestinal:  Negative for abdominal pain, nausea and vomiting.   Musculoskeletal:  Negative for back pain and neck pain.   Skin:  Positive for wound.   Neurological:  Negative for headaches.        Physical Exam  Vitals reviewed.   Constitutional:       General: He is not in acute distress.     Appearance: He is not ill-appearing.   HENT:      Head: Normocephalic.      Right Ear: External ear normal.      Left Ear: External ear normal.      Nose: Nose normal.      Mouth/Throat:      Mouth: Mucous membranes are moist.   Eyes:      General:         Right eye: No discharge.         Left eye: No discharge.      Conjunctiva/sclera: Conjunctivae normal.   Cardiovascular:      Rate and Rhythm: Normal rate and regular rhythm.      Heart sounds:      No friction rub. No gallop.   Pulmonary:      Effort: No respiratory distress.      Breath sounds: No stridor.   Abdominal:      General: There is no distension.      Tenderness: There is no abdominal tenderness. There is no guarding or rebound.   Musculoskeletal:         General: Swelling, tenderness, deformity and signs of injury present.      Cervical back: Normal range of motion and neck supple. No rigidity or tenderness.   Skin:     Coloration: Skin is not jaundiced.   Neurological:      Mental Status: He is alert.      Sensory: No sensory deficit.      Motor: No weakness.   Psychiatric:         Mood and Affect: Mood normal.         Behavior: Behavior normal.          Procedures     -------------------------------- Conscious Sedation Procedure Note -----------------------------  Patient Name: Cheikh Freedman   Medical  6th rib.   2. No evidence of traumatic injury to the abdomen/pelvis.   3. Hepatic steatosis.         CT HEAD WO CONTRAST   Final Result   No acute intracranial abnormality.         XR CHEST 1 VIEW   Final Result   The chest is only partially visualized however the visualized portion appears   grossly unremarkable.  Would advise a repeat radiograph if clinically   warranted.         XR PELVIS (1-2 VIEWS)   Final Result   No acute fracture or dislocation.         XR RADIUS ULNA RIGHT (2 VIEWS)   Final Result   1. Acute, comminuted fracture of the distal radius with dorsal displacement   and proximal migration.   2. Acute fracture of the olecranon process with approximately 2.4 cm   distraction of the dominant fracture components.   3. Probable fracture of the radial head.         XR WRIST RIGHT (2 VIEWS)   Final Result   1. Acute, comminuted fracture of the distal radius with dorsal displacement   and proximal migration.   2. Acute fracture of the olecranon process with approximately 2.4 cm   distraction of the dominant fracture components.   3. Probable fracture of the radial head.         XR WRIST LEFT (2 VIEWS)   Final Result   There are signs of interval closed reduction of the distal radial fracture.         XR RADIUS ULNA LEFT (2 VIEWS)    (Results Pending)   CT WRIST LEFT WO CONTRAST    (Results Pending)   FLUORO FOR SURGICAL PROCEDURES    (Results Pending)           ------------------------- NURSING NOTES AND VITALS REVIEWED ---------------------------  Date / Time Roomed:  8/23/2024  8:50 PM  ED Bed Assignment:  8516/8516-B    The nursing notes within the ED encounter and vital signs as below have been reviewed.     Patient Vitals for the past 24 hrs:   BP Temp Temp src Pulse Resp SpO2 Height Weight   08/24/24 0819 (!) 142/83 97.7 °F (36.5 °C) Temporal 86 16 97 % -- --   08/24/24 0608 -- -- -- -- 16 -- -- --   08/24/24 0538 -- -- -- -- 18 -- -- --   08/24/24 0451 -- -- -- -- 18 -- -- --   08/24/24 0315 (!)

## 2024-08-24 NOTE — H&P
TRAUMA HISTORY & PHYSICAL  ADULT  Attending/Surgical Resident/Advance Practice Nurse  8/23/2024  9:43 PM  CHIEF COMPLAINT:  Trauma alert.      PRIMARY SURVEY    34 y.o. male   fall from 25ft in tree   Injury occurred Prior to arrival   was cutting branches when he fell onto his hands to break fall. + LOC   No other complaints  No thinners   10 of ketamine by EMS     AIRWAY:   Airway  patent     EMS ETT Absent  Noisy respirations Absent  Retractions: Absent  Vomiting/bleeding: Absent    BREATHING:    Midaxillary breath sound left:  Normal  Midaxillary breath sound right:  Normal    FiO2:  15 L non-re breather mask    CIRCULATION:   Femerol pulse intensity: Strong  Palpebral conjunctiva: Red    Vitals:    08/23/24 2137 08/23/24 2139 08/23/24 2140 08/23/24 2142   BP: (!) 141/108   (!) 132/114   Pulse: (!) 103 100  97   Resp:       Temp:   97.8 °F (36.6 °C)    SpO2: 100%      Weight:       Height:            Central Nervous System    GCS Initial 15 minutes   Eye  Motor  Verbal 4 - Opens eyes on own  6 - Follows simple motor commands  5 - Alert and oriented 4 - Opens eyes on own  6 - Follows simple motor commands  5 - Alert and oriented     Neuromuscular blockade: No  Pupil size:  Left 5 mm    Right 5 mm  Pupil reaction: Left pupil:  Yes        Right pupil:  Yes  Wiggles fingers: Left Yes Right Yes  Wiggles toes: Left Yes   Right Yes    Hand grasp:   Left  Present      Right  Present  Plantar flexion: Left  Present      Right   Present    Loss of consciousness:  Yes    History Obtained From:  Patient & EMS  Private Medical Doctor: Zach Pulliam MD      Medication/Food Allergies: No medication allergies     Medications: NA  Anticoagulant use: None  Antiplatelet use:   None    Medical History:  ADHD    Surgical History:  no    Social History:   Tobacco use:  occasional cigarettes   E-cigarette/Vaping:  None  Alcohol use:  none  Illicit drug use:  no history of illicit drug use    NSAID use in last 72 hours: no  Taken

## 2024-08-24 NOTE — PROGRESS NOTES
TRAUMA TERTIARY    Admit Date: 8/23/2024    Fall distance 11 feet    CC:    Chief Complaint   Patient presents with    Trauma       Alcohol pre-screening:   How many times in the past year have you had 4-5 or more drinks in a day?  none  How much do you drink on a daily basis? none     Drug Pre-screening:    How many times in the past year have you used a recreational drug or used a prescription medication for non medical reasons?  No     Mood Prescreening:    During the past two weeks, have you been bothered by little interest or pleasure doing things?  No  During the past two weeks, have you been bothered by feeling down, depressed or hopeless?  No    Subjective:     Doing well postop day 1 from orthopedic surgery in the trauma of the bilateral open structures.  Tolerating a regular diet.  Minimal chest tenderness overlying the rib fractures.  Otherwise passing gas without any bowel movements      Objective:   Patient Vitals for the past 8 hrs:   BP Temp Temp src Pulse Resp SpO2   08/24/24 0608 -- -- -- -- 16 --   08/24/24 0538 -- -- -- -- 18 --   08/24/24 0451 -- -- -- -- 18 --   08/24/24 0315 (!) 141/63 97 °F (36.1 °C) Temporal 87 20 --   08/24/24 0300 129/81 97 °F (36.1 °C) Axillary 85 20 97 %   08/24/24 0245 121/84 -- -- 85 23 98 %   08/24/24 0230 (!) 141/80 -- -- 88 21 100 %   08/24/24 0215 114/82 -- -- 90 16 99 %   08/24/24 0200 (!) 120/94 -- -- 94 23 100 %   08/24/24 0147 (!) 163/93 97.4 °F (36.3 °C) Skin 93 16 100 %   08/24/24 0145 -- 97.4 °F (36.3 °C) Temporal 94 23 100 %       I/O last 3 completed shifts:  In: 2600 [P.O.:1600; I.V.:1000]  Out: -   No intake/output data recorded.    Radiology:  XR WRIST RIGHT (MIN 3 VIEWS)   Final Result   1. Interval placement of external fixator device on the left with improvement   seen in the alignment of the comminuted distal radius fracture with   intra-articular extension.   2. Displaced olecranon fracture with overlying splint obscuring some image   detail. The

## 2024-08-24 NOTE — PROCEDURES
PROCEDURE NOTE  Date: 8/23/2024   Name: Cheikh Freedman  YOB: 1990    CENTRAL LINE    Date/Time: 8/23/2024 9:48 PM    Performed by: Chitra Alan  Authorized by: Marvin Lima MD    Consent:     Consent obtained:  Verbal    Consent given by:  Patient  Universal protocol:     Patient identity confirmed:  Verbally with patient  Pre-procedure details:     Indication(s): central venous access      Hand hygiene: Hand hygiene performed prior to insertion      Sterile barrier technique: All elements of maximal sterile technique followed      Skin preparation:  Chlorhexidine and povidone-iodine    Skin preparation agent: Skin preparation agent completely dried prior to procedure    Sedation:     Sedation type:  Moderate sedation  Procedure details:     Location:  L internal jugular    Site selection rationale:  First attempted L subclavian however due to patient anatomy decision made to attempt IJ    Patient position:  Supine    Procedural supplies:  Triple lumen    Catheter size:  7 Fr    Landmarks identified: yes      Ultrasound guidance: yes      Ultrasound guidance timing: real time      Sterile ultrasound techniques: Sterile gel and sterile probe covers were used      Number of attempts:  1    Successful placement: yes    Post-procedure details:     Post-procedure:  Dressing applied and line sutured    Assessment:  Blood return through all ports and free fluid flow    Procedure completion:  Tolerated  Comments:      Post procedural xray ordered  Dr. Lima was present

## 2024-08-24 NOTE — PROGRESS NOTES
4 Eyes Skin Assessment     NAME:  Cheikh Freedman  YOB: 1990  MEDICAL RECORD NUMBER:  [id number]    The patient is being assessed for  Admission    I agree that at least one RN has performed a thorough Head to Toe Skin Assessment on the patient. ALL assessment sites listed below have been assessed.      Areas assessed by both nurses:    Head, Face, Ears, Shoulders, Back, Chest, Arms, Elbows, Hands, Sacrum. Buttock, Coccyx, Ischium, and Legs. Feet and Heels        Does the Patient have a Wound? No noted wound(s)       Raj Prevention initiated by RN: Yes  Wound Care Orders initiated by RN: No    Pressure Injury (Stage 3,4, Unstageable, DTI, NWPT, and Complex wounds) if present, place Wound referral order by RN under : No    New Ostomies, if present place, Ostomy referral order under : No     Nurse 1 eSignature: Electronically signed by Zach Soler RN on 8/24/24 at 6:03 AM EDT    **SHARE this note so that the co-signing nurse can place an eSignature**    Nurse 2 eSignature: Electronically signed by Mcihelle Olivier RN on 8/24/24 at 6:05 AM EDT

## 2024-08-24 NOTE — PROGRESS NOTES
OCCUPATIONAL THERAPY INITIAL EVALUATION    [facility] [address], [place]       Date:2024                                                  Patient Name: Cheikh Freedman  MRN: [id number]  : 1990  Room: 85/8516-    Evaluating OT: [place] OTR/L [id number]    Referring Provider: Mehdi Marinelli DO      Specific Provider Orders/Date: OT evaluation and treatment 24 0330    Diagnosis:  Poor venous access [I87.8]  Fall from tree, initial encounter [W14.XXXA]      Pertinent Medical History:  has no past medical history on file.   History reviewed. No pertinent surgical history.    Pt admitted to the hospital  s/p fall out of a tree   CT C abdomen and pelvis   Impression   1. Mildly displaced fractures of the right anterolateral 6th and 7th ribs.  Questionable nondisplaced fracture of the left anterior 6th rib.  2. No evidence of traumatic injury to the abdomen/pelvis.     Procedure(s):  Irrigation and debridement of left open grade 3 distal radius fracture with application of external fixator  Irrigation debridement of right open grade 2 distal radius fracture with closed reduction and splint application  Closed treatment of olecranon fracture.     Orders received, chart reviewed, eval complete.     Precautions:  Fall Risk, NWB BUE, R rib fractures (6th and 7th)  RUE splinted past elbow   LUE ex-fix     Assessment of current deficits   [x] Functional mobility   [x]ADLs  [x] Strength               []Cognition   [x] Functional transfers   [x] IADLs         [x] Safety Awareness   [x]Endurance   [x] Fine Motor Coordination [x] Balance [] Vision/perception   []Sensation    [x] Gross Motor Coordination [x] ROM  [] Delirium                  [] Motor Control     OT PLAN OF CARE   OT POC based on physician orders, patient diagnosis and results of clinical assessment    Frequency/Duration 2-4 days/wk for 2 weeks PRN   Specific OT  Assessment: AM-PAC Inpatient Daily Activity Raw Score: 12 /24     Initial Eval Status  Date: 8/24/2024   Treatment Status  Date: STG=LTG  Time frame: 10-14 days   Feeding Min A   Over all with LUE     Supervision for sips of water from cup with straw   Set up   Grooming Max A    Set up   UB Dressing Max A    Min A    LB Dressing Total/Dependent  Socks   Mod A    Bathing Max A    Mod A    Toileting Max A  Simulated for clothing management and jessee care     Set up with urinal and able to grasp with LUE   Min A    Bed Mobility  Rolling L/R: NT   Supine to sit: Min A    Sit to supine: NT   Supine to sit: Mod I   Sit to supine: Mod I    Functional Transfers Sit to stand: Min A    Stand to sit: Min A    Stand pivot: Min A    Mod I    Functional Mobility Min A   short distance in the room and out to gandhi with no AD  - reports pain in R groin   Mod I     Balance Sitting: Stand by assist       Standing: Min A    Sitting: Mod I       Standing: Mod I    Activity Tolerance Fair    Good   Visual/  Perceptual wears glasses and no glasses present               BUE  ROM/Strength/  Fine motor Coordination Hand dominance: right     RUE: ROM impaired in all rnages - reports pain in elbow with attempts at shoulder mobility      Strength: NT   able to move all digits in splint     LUE: ROM shoulder and elbow WFL      Strength: NT      Able to move all digits      Safety   Fair +  Good during functional activity      Hearing: WFL  Sensation: no c/o numbness or tingling   Tone: WFL   Edema: edema in BUE     Comments:   RN cleared patient for OT.  Upon arrival, patient was semi-supine in bed  and agreeable to OT session. no visitors present throughout session . At end of session, patient sitting in chair with arms ; with call light and phone within reach; all lines and tubes intact.   Patient presents with decreased safety awareness, activity tolerance , balance , coordination, and strength . Pt demonstrated decreased independence during

## 2024-08-24 NOTE — ED NOTES
This RN triaged this patient by mistake, disregard all notes in triage made by this RN. Charge Made aware

## 2024-08-24 NOTE — CONSULTS
Department of Orthopedic Trauma Surgery  Resident consult note      CHIEF COMPLAINT:   Chief Complaint   Patient presents with    Trauma       HISTORY OF PRESENT ILLNESS:                Patient is a 34 y.o. male who presents with bilateral wrist as well as right elbow pain.  Patient states he was cutting down a tree and was on a ladder when the ladder slipped and he fell does not know how far he fell.  He had immediate pain and deformity and open wounds to bilateral wrists.  He was immediately brought into the trauma bay [facility] emergency department.  Denies any other orthopedic complaints at this time.         Past Medical History:    No past medical history on file.  Past Surgical History:    No past surgical history on file.  Current Medications:   Current Facility-Administered Medications: 0.9 % sodium chloride infusion, , IntraVENous, Continuous  morphine (PF) injection 2 mg, 2 mg, IntraVENous, Q2H PRN  ondansetron (ZOFRAN) injection 4 mg, 4 mg, IntraVENous, Q6H PRN  piperacillin-tazobactam (ZOSYN) 4,500 mg in sodium chloride 0.9 % 100 mL IVPB (Hcne3Csm), 4,500 mg, IntraVENous, Once  Allergies:  Patient has no known allergies.    Social History:   TOBACCO:   has no history on file for tobacco use.  ETOH:   has no history on file for alcohol use.  DRUGS:   has no history on file for drug use.  ACTIVITIES OF DAILY LIVING:    OCCUPATION:    Family History:   No family history on file.    REVIEW OF SYSTEMS:   Skin: no acute changes  Eyes: no acute changes  Ears/Nose/Throat: no acute changes  Respiratory: No increased work of breathing, no coughing  Cardiovascular: Brisk capillary refill bilaterally, well perfused extremities  Gastrointestinal: no acute changes  Neurologic: no acute changes  MUSCULOSKELETAL:  positive for  pain      PHYSICAL EXAM:    VITALS:  /73   Pulse 88   Temp 97.8 °F (36.6 °C)   Resp 12   Ht 1.88 m (6' 2\")   Wt (!) 145.2 kg (320 lb)   SpO2 100%   BMI 41.09 kg/m²

## 2024-08-24 NOTE — PROGRESS NOTES
Department of Orthopedic Surgery  Resident Progress Note    Patient seen and examined at bedside this morning.  He is resting comfortably and in no acute distress.  He does have some pain to the left wrist however his right wrist is feeling moderately improved at this point.  External fixator in place to left wrist with no significant pin drainage.  Any or questions regarding his surgery or postoperative course were answered him today.   No new complaints.  Denies chest pain, shortness of breath, dizziness/lightheadedness.      VITALS:  BP (!) 142/83   Pulse 86   Temp 97.7 °F (36.5 °C) (Temporal)   Resp 16   Ht 1.88 m (6' 2\")   Wt (!) 145.2 kg (320 lb)   SpO2 97%   BMI 41.09 kg/m²     General: Awake alert in no acute distress    MUSCULOSKELETAL:   left upper extremity:  Dressing C/D/I external fixator pins in place without significant drainage  Compartments soft and compressible  +AIN/PIN/Ulnar/Median/Radial nerve function intact grossly  Brisk capillary refill, warm and well-perfused  Subjectively states sensation intact to radial, ulnar, nerve distributions hand and fingers    Right upper extremity:  Dressing clean dry and intact with sugar-tong splint and posterior slab splint in place that are well-padded.  No significant drainage at this point  Compartment soft compressible  Maintains motor function to AIN/PIN/ulnar/median and radial nerves  Brisk capillary refill, warm and well-perfused  Subjectively states sensation intact to light touch in radial, ulnar, median nerve distributions of the hand and fingers    CBC:   Lab Results   Component Value Date/Time    WBC 17.9 08/24/2024 05:15 AM    HGB 14.0 08/24/2024 05:15 AM    HCT 41.3 08/24/2024 05:15 AM     08/24/2024 05:15 AM     PT/INR:    Lab Results   Component Value Date/Time    PROTIME 10.4 08/23/2024 09:05 PM    INR 1.0 08/23/2024 09:05 PM       ASSESSMENT  S/P left distal radius I&D with external fixator application; right distal radius I&D

## 2024-08-25 LAB
ALBUMIN SERPL-MCNC: 3.6 G/DL (ref 3.5–5.2)
ALP SERPL-CCNC: 73 U/L (ref 40–129)
ALT SERPL-CCNC: 828 U/L (ref 0–40)
ANION GAP SERPL CALCULATED.3IONS-SCNC: 9 MMOL/L (ref 7–16)
AST SERPL-CCNC: 472 U/L (ref 0–39)
BASOPHILS # BLD: 0 K/UL (ref 0–0.2)
BASOPHILS NFR BLD: 0 % (ref 0–2)
BILIRUB SERPL-MCNC: 0.9 MG/DL (ref 0–1.2)
BUN SERPL-MCNC: 20 MG/DL (ref 6–20)
CALCIUM SERPL-MCNC: 8.4 MG/DL (ref 8.6–10.2)
CHLORIDE SERPL-SCNC: 99 MMOL/L (ref 98–107)
CO2 SERPL-SCNC: 25 MMOL/L (ref 22–29)
CREAT SERPL-MCNC: 0.9 MG/DL (ref 0.7–1.2)
EOSINOPHIL # BLD: 0 K/UL (ref 0.05–0.5)
EOSINOPHILS RELATIVE PERCENT: 0 % (ref 0–6)
ERYTHROCYTE [DISTWIDTH] IN BLOOD BY AUTOMATED COUNT: 12.5 % (ref 11.5–15)
GFR, ESTIMATED: >90 ML/MIN/1.73M2
GLUCOSE SERPL-MCNC: 143 MG/DL (ref 74–99)
HCT VFR BLD AUTO: 36.4 % (ref 37–54)
HGB BLD-MCNC: 12.5 G/DL (ref 12.5–16.5)
LYMPHOCYTES NFR BLD: 1.57 K/UL (ref 1.5–4)
LYMPHOCYTES RELATIVE PERCENT: 9 % (ref 20–42)
MCH RBC QN AUTO: 31.8 PG (ref 26–35)
MCHC RBC AUTO-ENTMCNC: 34.3 G/DL (ref 32–34.5)
MCV RBC AUTO: 92.6 FL (ref 80–99.9)
MONOCYTES NFR BLD: 0.79 K/UL (ref 0.1–0.95)
MONOCYTES NFR BLD: 4 % (ref 2–12)
NEUTROPHILS NFR BLD: 87 % (ref 43–80)
NEUTS SEG NFR BLD: 15.74 K/UL (ref 1.8–7.3)
PLATELET # BLD AUTO: 202 K/UL (ref 130–450)
PMV BLD AUTO: 10.5 FL (ref 7–12)
POTASSIUM SERPL-SCNC: 4.5 MMOL/L (ref 3.5–5)
PROT SERPL-MCNC: 6.2 G/DL (ref 6.4–8.3)
RBC # BLD AUTO: 3.93 M/UL (ref 3.8–5.8)
RBC # BLD: ABNORMAL 10*6/UL
RBC # BLD: ABNORMAL 10*6/UL
SODIUM SERPL-SCNC: 133 MMOL/L (ref 132–146)
WBC OTHER # BLD: 18.1 K/UL (ref 4.5–11.5)

## 2024-08-25 PROCEDURE — 85025 COMPLETE CBC W/AUTO DIFF WBC: CPT

## 2024-08-25 PROCEDURE — 94669 MECHANICAL CHEST WALL OSCILL: CPT

## 2024-08-25 PROCEDURE — 6370000000 HC RX 637 (ALT 250 FOR IP): Performed by: UROLOGY

## 2024-08-25 PROCEDURE — 94664 DEMO&/EVAL PT USE INHALER: CPT

## 2024-08-25 PROCEDURE — 2060000000 HC ICU INTERMEDIATE R&B

## 2024-08-25 PROCEDURE — 51702 INSERT TEMP BLADDER CATH: CPT

## 2024-08-25 PROCEDURE — 94640 AIRWAY INHALATION TREATMENT: CPT

## 2024-08-25 PROCEDURE — 2580000003 HC RX 258: Performed by: STUDENT IN AN ORGANIZED HEALTH CARE EDUCATION/TRAINING PROGRAM

## 2024-08-25 PROCEDURE — 6360000002 HC RX W HCPCS: Performed by: STUDENT IN AN ORGANIZED HEALTH CARE EDUCATION/TRAINING PROGRAM

## 2024-08-25 PROCEDURE — 99233 SBSQ HOSP IP/OBS HIGH 50: CPT | Performed by: SURGERY

## 2024-08-25 PROCEDURE — 80053 COMPREHEN METABOLIC PANEL: CPT

## 2024-08-25 PROCEDURE — 6370000000 HC RX 637 (ALT 250 FOR IP): Performed by: STUDENT IN AN ORGANIZED HEALTH CARE EDUCATION/TRAINING PROGRAM

## 2024-08-25 PROCEDURE — 6370000000 HC RX 637 (ALT 250 FOR IP): Performed by: SURGERY

## 2024-08-25 RX ORDER — DEXTROAMPHETAMINE SACCHARATE, AMPHETAMINE ASPARTATE MONOHYDRATE, DEXTROAMPHETAMINE SULFATE AND AMPHETAMINE SULFATE 2.5; 2.5; 2.5; 2.5 MG/1; MG/1; MG/1; MG/1
20 CAPSULE, EXTENDED RELEASE ORAL DAILY
Status: DISCONTINUED | OUTPATIENT
Start: 2024-08-25 | End: 2024-09-03 | Stop reason: HOSPADM

## 2024-08-25 RX ORDER — AMLODIPINE BESYLATE 5 MG/1
5 TABLET ORAL DAILY
Status: DISCONTINUED | OUTPATIENT
Start: 2024-08-25 | End: 2024-09-03 | Stop reason: HOSPADM

## 2024-08-25 RX ORDER — TAMSULOSIN HYDROCHLORIDE 0.4 MG/1
0.4 CAPSULE ORAL NIGHTLY
Status: DISCONTINUED | OUTPATIENT
Start: 2024-08-25 | End: 2024-09-03 | Stop reason: HOSPADM

## 2024-08-25 RX ORDER — DEXTROAMPHETAMINE SULFATE, DEXTROAMPHETAMINE SACCHARATE, AMPHETAMINE SULFATE AND AMPHETAMINE ASPARTATE 5; 5; 5; 5 MG/1; MG/1; MG/1; MG/1
20 CAPSULE, EXTENDED RELEASE ORAL DAILY
COMMUNITY
Start: 2024-08-03

## 2024-08-25 RX ORDER — ALBUTEROL SULFATE 0.83 MG/ML
2.5 SOLUTION RESPIRATORY (INHALATION) EVERY 6 HOURS PRN
Status: DISCONTINUED | OUTPATIENT
Start: 2024-08-25 | End: 2024-09-03 | Stop reason: HOSPADM

## 2024-08-25 RX ORDER — DEXTROAMPHETAMINE SACCHARATE, AMPHETAMINE ASPARTATE, DEXTROAMPHETAMINE SULFATE AND AMPHETAMINE SULFATE 5; 5; 5; 5 MG/1; MG/1; MG/1; MG/1
20 TABLET ORAL 2 TIMES DAILY
Status: DISCONTINUED | OUTPATIENT
Start: 2024-08-25 | End: 2024-08-25 | Stop reason: ALTCHOICE

## 2024-08-25 RX ADMIN — GABAPENTIN 300 MG: 600 TABLET, COATED ORAL at 07:43

## 2024-08-25 RX ADMIN — GABAPENTIN 300 MG: 600 TABLET, COATED ORAL at 21:26

## 2024-08-25 RX ADMIN — MORPHINE SULFATE 4 MG: 4 INJECTION, SOLUTION INTRAMUSCULAR; INTRAVENOUS at 18:21

## 2024-08-25 RX ADMIN — OXYCODONE HYDROCHLORIDE 10 MG: 10 TABLET ORAL at 11:29

## 2024-08-25 RX ADMIN — POLYETHYLENE GLYCOL 3350 17 G: 17 POWDER, FOR SOLUTION ORAL at 07:44

## 2024-08-25 RX ADMIN — SODIUM CHLORIDE, PRESERVATIVE FREE 10 ML: 5 INJECTION INTRAVENOUS at 21:27

## 2024-08-25 RX ADMIN — ARFORMOTEROL TARTRATE: 15 SOLUTION RESPIRATORY (INHALATION) at 20:55

## 2024-08-25 RX ADMIN — ENOXAPARIN SODIUM 40 MG: 100 INJECTION SUBCUTANEOUS at 07:44

## 2024-08-25 RX ADMIN — METHOCARBAMOL TABLETS 1000 MG: 500 TABLET, COATED ORAL at 07:42

## 2024-08-25 RX ADMIN — ACETAMINOPHEN 650 MG: 325 TABLET ORAL at 21:25

## 2024-08-25 RX ADMIN — METHOCARBAMOL TABLETS 1000 MG: 500 TABLET, COATED ORAL at 18:21

## 2024-08-25 RX ADMIN — TAMSULOSIN HYDROCHLORIDE 0.4 MG: 0.4 CAPSULE ORAL at 21:26

## 2024-08-25 RX ADMIN — MORPHINE SULFATE 4 MG: 4 INJECTION, SOLUTION INTRAMUSCULAR; INTRAVENOUS at 23:24

## 2024-08-25 RX ADMIN — MORPHINE SULFATE 4 MG: 4 INJECTION, SOLUTION INTRAMUSCULAR; INTRAVENOUS at 09:19

## 2024-08-25 RX ADMIN — ARFORMOTEROL TARTRATE: 15 SOLUTION RESPIRATORY (INHALATION) at 12:07

## 2024-08-25 RX ADMIN — ACETAMINOPHEN 650 MG: 325 TABLET ORAL at 16:25

## 2024-08-25 RX ADMIN — AMLODIPINE BESYLATE 5 MG: 5 TABLET ORAL at 14:37

## 2024-08-25 RX ADMIN — MORPHINE SULFATE 4 MG: 4 INJECTION, SOLUTION INTRAMUSCULAR; INTRAVENOUS at 00:41

## 2024-08-25 RX ADMIN — ACETAMINOPHEN 650 MG: 325 TABLET ORAL at 04:27

## 2024-08-25 RX ADMIN — OXYCODONE HYDROCHLORIDE 10 MG: 10 TABLET ORAL at 16:25

## 2024-08-25 RX ADMIN — SODIUM CHLORIDE, PRESERVATIVE FREE 10 ML: 5 INJECTION INTRAVENOUS at 07:44

## 2024-08-25 RX ADMIN — ACETAMINOPHEN 650 MG: 325 TABLET ORAL at 09:20

## 2024-08-25 RX ADMIN — WATER 1000 MG: 1 INJECTION INTRAMUSCULAR; INTRAVENOUS; SUBCUTANEOUS at 07:43

## 2024-08-25 RX ADMIN — SODIUM CHLORIDE, PRESERVATIVE FREE 10 ML: 5 INJECTION INTRAVENOUS at 21:26

## 2024-08-25 RX ADMIN — OXYCODONE HYDROCHLORIDE 10 MG: 10 TABLET ORAL at 20:27

## 2024-08-25 RX ADMIN — OXYCODONE HYDROCHLORIDE 10 MG: 10 TABLET ORAL at 07:43

## 2024-08-25 RX ADMIN — METHOCARBAMOL TABLETS 1000 MG: 500 TABLET, COATED ORAL at 21:26

## 2024-08-25 RX ADMIN — GABAPENTIN 300 MG: 600 TABLET, COATED ORAL at 13:52

## 2024-08-25 RX ADMIN — MORPHINE SULFATE 4 MG: 4 INJECTION, SOLUTION INTRAMUSCULAR; INTRAVENOUS at 12:37

## 2024-08-25 RX ADMIN — MORPHINE SULFATE 2 MG: 2 INJECTION, SOLUTION INTRAMUSCULAR; INTRAVENOUS at 04:28

## 2024-08-25 RX ADMIN — MORPHINE SULFATE 4 MG: 4 INJECTION, SOLUTION INTRAMUSCULAR; INTRAVENOUS at 21:25

## 2024-08-25 RX ADMIN — METHOCARBAMOL TABLETS 1000 MG: 500 TABLET, COATED ORAL at 13:46

## 2024-08-25 RX ADMIN — OXYCODONE HYDROCHLORIDE 10 MG: 10 TABLET ORAL at 02:11

## 2024-08-25 RX ADMIN — MORPHINE SULFATE 2 MG: 2 INJECTION, SOLUTION INTRAMUSCULAR; INTRAVENOUS at 14:37

## 2024-08-25 ASSESSMENT — PAIN DESCRIPTION - ORIENTATION
ORIENTATION: RIGHT;LEFT;LOWER;MID
ORIENTATION: RIGHT;LEFT

## 2024-08-25 ASSESSMENT — PAIN DESCRIPTION - DESCRIPTORS
DESCRIPTORS: ACHING;DISCOMFORT;TENDER;THROBBING
DESCRIPTORS: ACHING;DISCOMFORT;SORE
DESCRIPTORS: ACHING;DISCOMFORT;SORE;SHARP
DESCRIPTORS: ACHING;DISCOMFORT;SORE;SHARP
DESCRIPTORS: ACHING;DISCOMFORT;SORE
DESCRIPTORS: ACHING;DISCOMFORT;SORE
DESCRIPTORS: ACHING;DISCOMFORT;SORE;SHARP
DESCRIPTORS: ACHING;DISCOMFORT;SORE;SHARP
DESCRIPTORS: ACHING;DISCOMFORT;THROBBING;SHOOTING
DESCRIPTORS: ACHING;DISCOMFORT;THROBBING
DESCRIPTORS: ACHING;DISCOMFORT;SORE

## 2024-08-25 ASSESSMENT — PAIN DESCRIPTION - LOCATION
LOCATION: WRIST
LOCATION: ARM
LOCATION: WRIST
LOCATION: ARM
LOCATION: ARM
LOCATION: WRIST
LOCATION: ARM
LOCATION: WRIST;ARM
LOCATION: WRIST

## 2024-08-25 ASSESSMENT — PAIN SCALES - GENERAL
PAINLEVEL_OUTOF10: 10
PAINLEVEL_OUTOF10: 8
PAINLEVEL_OUTOF10: 10
PAINLEVEL_OUTOF10: 10
PAINLEVEL_OUTOF10: 8
PAINLEVEL_OUTOF10: 10
PAINLEVEL_OUTOF10: 5
PAINLEVEL_OUTOF10: 7
PAINLEVEL_OUTOF10: 8
PAINLEVEL_OUTOF10: 9
PAINLEVEL_OUTOF10: 7
PAINLEVEL_OUTOF10: 10
PAINLEVEL_OUTOF10: 8
PAINLEVEL_OUTOF10: 10

## 2024-08-25 ASSESSMENT — PAIN - FUNCTIONAL ASSESSMENT
PAIN_FUNCTIONAL_ASSESSMENT: PREVENTS OR INTERFERES SOME ACTIVE ACTIVITIES AND ADLS

## 2024-08-25 NOTE — PROGRESS NOTES
Department of Orthopedic Surgery  Resident Progress Note    Patient seen and examined sitting in the chair at bedside today with with his wife.  He is resting comfortably and in no acute distress states his pain is continuing to improve.  He does note difficulty with range of motion to the left hand.   No new complaints.  Denies chest pain, shortness of breath, dizziness/lightheadedness.      VITALS:  BP (!) 177/102   Pulse (!) 108   Temp 97.8 °F (36.6 °C) (Temporal)   Resp 18   Ht 1.88 m (6' 2\")   Wt (!) 145.2 kg (320 lb)   SpO2 95%   BMI 41.09 kg/m²     General: Awake alert in no acute distress    MUSCULOSKELETAL:   left upper extremity:  Dressing C/D/I external fixator pins in place without significant drainage  Compartments soft and compressible  Although limited in range of motion and strength motor function grossly intact to AIN/PIN/ulnar/median and radial nerves   Brisk capillary refill, warm and well-perfused  Subjectively states sensation intact to radial, ulnar, median nerve distributions hand and fingers and equal to contralateral side    Right upper extremity:  Dressing clean dry and intact with sugar-tong splint and posterior slab splint in place that are well-padded.  No significant drainage at this point  Compartment soft compressible  Maintains motor function to AIN/PIN/ulnar/median and radial nerves  Brisk capillary refill, warm and well-perfused  Subjectively states sensation intact to light touch in radial, ulnar, median nerve distributions of the hand and fingers    CBC:   Lab Results   Component Value Date/Time    WBC 18.1 08/25/2024 05:40 AM    HGB 12.5 08/25/2024 05:40 AM    HCT 36.4 08/25/2024 05:40 AM     08/25/2024 05:40 AM     PT/INR:    Lab Results   Component Value Date/Time    PROTIME 10.4 08/23/2024 09:05 PM    INR 1.0 08/23/2024 09:05 PM       ASSESSMENT  S/P left distal radius I&D with external fixator application; right distal radius I&D with closed reduction and

## 2024-08-25 NOTE — CONSULTS
8/25/2024 12:25 PM  Cheikh Freedman  [id number]     Chief Complaint:    Situational urinary retention       History of Present Illness:      The patient is a 34 y.o. male patient who presented to the hospital with complaints of a fall from 25ft tree.  He sustained bilateral radius fractures.  His wife is at his bedside.  Urology is asked to evaluate the patient for urinary retention. He was bladder scanned for 647 and straight cathed for 1100 and again was bladder scanned for 599 and straight cathed for 980. A su was then placed with a return of 600ml.  He is comfortable with the catheter.  He has no prior history of retention or voiding difficulties.  He has never seen a urologist.  He denies hematuria, dysuria, UTIs.  CT scan on 8/23/2024 showed displaced fractures of the right sixth and seventh ribs, no evidence of abdominal or pelvic trauma or injury, hepatic steatosis and scattered diverticuli.  The kidneys and bladder are normal      Past Medical History:  Obesity    Past Surgical History:  Irrigation and debridement of bilateral radius fractures on 8/23/24    Medications Prior to Admission:    Medications Prior to Admission: ADDERALL XR 20 MG capsule, Take 1 capsule by mouth Daily. Max Daily Amount: 20 mg  albuterol (PROVENTIL) (2.5 MG/3ML) 0.083% nebulizer solution, Take 3 mLs by nebulization every 6 hours as needed for Wheezing  fluticasone-salmeterol (ADVAIR HFA) 45-21 MCG/ACT inhaler, Inhale 2 puffs into the lungs 2 times daily  dupilumab (DUPIXENT) 300 MG/2ML SOPN injection, Inject 2 mLs into the skin once  [DISCONTINUED] amphetamine-dextroamphetamine (ADDERALL) 20 MG tablet, Take 1 tablet by mouth 2 times daily.    Allergies:    Patient has no known allergies.    Social History:    reports that he has never smoked. He has never used smokeless tobacco.  He is  and has 3 children.  He is a     Family History:   Non-contributory to this Urological problem  family history is not  on file.    Review of Systems:  Constitutional: No fever or chills   Respiratory: negative for cough and hemoptysis  Cardiovascular: negative for chest pain and dyspnea  Gastrointestinal: negative for abdominal pain, diarrhea, nausea and vomiting   Derm: negative for rash and skin lesion(s)  Neurological: negative for seizures and tremors  Musculoskeletal: Negative    Psychiatric: Negative   : As above in the HPI, otherwise negative  All other reviews are negative    Physical Exam:     Vitals:  BP (!) 174/104   Pulse (!) 109   Temp 97.7 °F (36.5 °C) (Temporal)   Resp 18   Ht 1.88 m (6' 2\")   Wt (!) 145.2 kg (320 lb)   SpO2 95%   BMI 41.09 kg/m²     General:  Awake, alert, oriented X 3. No apparent distress.  He is sitting up in a chair  HEENT:  Normocephalic, atraumatic.  Lungs:  Respirations symmetric and non-labored.  Abdomen:  soft, nontender, no masses.  His abdomen is obese  Extremities:  No clubbing, cyanosis, or edema.  There is an external fixator on the left forearm.  Both forearms are wrapped in gauze and dressings  Skin:  Warm and dry, no open lesions or rashes  Neuro: There are no motor or sensory deficits in the 4 quadrant extremities   Rectal: deferred  Genitourinary: Normal male genitalia.  16 Bengali Montes draining clear, yellow urine into a gravity bag    Labs:     Recent Labs     08/23/24 2105 08/24/24  0515 08/25/24  0540   WBC 9.6 17.9* 18.1*   RBC 5.02 4.39 3.93   HGB 15.0 14.0 12.5   HCT 45.2 41.3 36.4*   MCV 90.0 94.1 92.6   MCH 29.9 31.9 31.8   MCHC 33.2 33.9 34.3   RDW 12.4 12.7 12.5    217 202   MPV 10.8 10.9 10.5         Recent Labs     08/23/24 2105 08/24/24  0515 08/25/24  0540   CREATININE 1.1 0.9 0.9       No results found for: \"PSA\"    Imaging:   Narrative & Impression  EXAMINATION:  CT OF THE ABDOMEN AND PELVIS WITH CONTRAST; CT OF THE CHEST WITH CONTRAST  8/23/2024 10:33 pm     TECHNIQUE:  CT of the abdomen and pelvis was performed with the administration

## 2024-08-25 NOTE — DISCHARGE INSTRUCTIONS
TRAUMA SERVICES DISCHARGE INSTRUCTIONS    Call [phone], option 2, for any questions/concerns and for follow-up appointment in 1 week(s).    Please follow the instructions checked below:  Please follow-up with your primary care provider.    ACTIVITY INSTRUCTIONS  Increase activity as tolerated  No heavy lifting or strenuous activity  Take your incentive spirometer home and use 4-6 times/day   [x]  No driving until cleared by trauma, orthopedic surgery    WOUND/DRESSING INSTRUCTIONS:  You may shower.  No sitting in bath tub, hot tub or swimming until cleared by physician.  Ice to areas of pain for first 24 hours.  Heat to areas of pain after that.  Wash areas of lacerations/abrasions with soap & water.  Rinse well.  Pat dry with clean towel.  Apply thin layer of Bacitracin, Neosporin, or triple antibiotic cream to affected area 2-3 times per day.  Keep wounds clean and dry.    MEDICATION INSTRUCTIONS  Take medication as prescribed.  When taking pain medications, you may experience dizziness or drowsiness.  Do not drink alcohol or drive when taking these medications.  You may experience constipation while taking pain medication.  You may take over the counter stool softeners such as docusate (Colace), sennosides S (Senokot-S), or Miralax.   []  You may take Ibuprofen (over the counter) as directed for mild pain.     --You may take up to 800mg every 8 hours for pain, please take with food or milk.   [x]  You may take acetaminophen (Tylenol) products.  Do NOT take more than 4000mg of Tylenol in 24h.   []  Do not take any other acetaminophen (Tylenol) products if you are taking Percocet or Norco, as these contain Tylenol.   --Do NOT take more than 4000mg of Tylenol in 24h.    OPIOID MEDICATION INSTRUCTIONS  Read the medication guide that is included with your prescription.  Take your medication exactly as prescribed.  Store medication away from children and in a safe place.  Do NOT share your medication with  others.  Do NOT take medication unless it is prescribed for you.  Do NOT drink alcohol while taking opioids (I.e., Norco, Percocet, Oxycodone, etc).  Discuss with the Trauma Clinic staff if the dose of medication you are taking does not control your pain and any side effects that you may be having.      CALL 911 OR YOUR LOCAL EMERGENCY SERVICE:  --If you take too much medication  --If you have trouble breathing or shortness of breath  --A child has taken this medication.    WORK:  You may not return to work until you receive follow-up with the Trauma Clinic or clearance by all consultants.    Call the trauma clinic for any of the following or for questions/concerns;  --fever over 101F  --redness, swelling, hardness or warmth at the wound site(s).  --Unrelieved nausea/vomiting  --Foul smelling or cloudy drainage at the wound site(s)  --Unrelieved pain or increase in pain  --Increase in shortness of breath    Follow-up:  Trauma Clinic: [phone]--press option 2  [facility] [address]  [address] [place]  [address]      [employer] is a secure online portal that allows you to access your electronic medical record and send messages to your doctor directly without going to the clinic or picking up the phone. You can also access your test results, communicate with your doctor, pay online, manage your appointments, and request prescription refills.     To sign up for [employer], please scan the QR code below.     RIB FRACTURE DISCHARGE INSTRUCTIONS    Your ribs are curved bones in your chest that protect inner structures like your lungs and heart.  The ribs expand and contract when you breathe.  Pain can result making it hard to cough or breathe deeply.  The difficulty increases if several ribs are broken on both sides.  You are likely to heal in 6 to 8 weeks, but may take longer if you are elderly.  Most rib fractures heal on their own with no lasting effects.    Treatment:   Take the medications

## 2024-08-25 NOTE — PROGRESS NOTES
[facility]  ATTENDING PHYSICIAN PROGRESS NOTE      I personally saw, examined and provided care for the patient. Radiographs, labs and medications were reviewed by me independently.  The case was discussed in detail and plans for care were established. Review of Residents documentation was conducted and revisions were made as appropriate. I agree with the above documented exam, problem list and plan of care.    The following summarizes my clinical findings and independent assessment.     CC: Fall from tree    S.  Patient seen in a chair today.  O.  Awake alert x3, GCS 15  No apparent distress  Heart regular rate rhythm  Lungs are clear bilaterally  Abdomen soft nontender nondistended  Extremity-Ex-Fix on the left, right upper extremity splinted and reduced    ASSESSMENT:  Principal Problem:    Fall from tree, initial encounter  Active Problems:    Poor venous access    Open fracture of distal end of radius    Closed fracture of right olecranon process    Closed fracture of multiple ribs of both sides  Resolved Problems:    * No resolved hospital problems. *       PLAN:  LABS:  -I have personally reviewed the patient's labs   CBC with Differential:    Lab Results   Component Value Date/Time    WBC 18.1 08/25/2024 05:40 AM    RBC 3.93 08/25/2024 05:40 AM    HGB 12.5 08/25/2024 05:40 AM    HCT 36.4 08/25/2024 05:40 AM     08/25/2024 05:40 AM    MCV 92.6 08/25/2024 05:40 AM    MCH 31.8 08/25/2024 05:40 AM    MCHC 34.3 08/25/2024 05:40 AM    RDW 12.5 08/25/2024 05:40 AM    LYMPHOPCT 9 08/25/2024 05:40 AM    MONOPCT 4 08/25/2024 05:40 AM    EOSPCT 0 08/25/2024 05:40 AM    BASOPCT 0 08/25/2024 05:40 AM    MONOSABS 0.79 08/25/2024 05:40 AM    LYMPHSABS 1.57 08/25/2024 05:40 AM    EOSABS 0.00 08/25/2024 05:40 AM    BASOSABS 0.00 08/25/2024 05:40 AM     CMP:    Lab Results   Component Value Date/Time     08/25/2024 05:40 AM    K 4.5 08/25/2024 05:40 AM    CL 99 08/25/2024 05:40 AM    CO2 25

## 2024-08-25 NOTE — PROGRESS NOTES
GENERAL SURGERY  DAILY PROGRESS NOTE  8/25/2024  Subjective:  Pain is controlled.  Had a bowel movement.  Says he is tolerating diet.    No intake/output data recorded.  I/O last 3 completed shifts:  In: 2600 [P.O.:1600; I.V.:1000]  Out: 3630 [Urine:3630]    Objective:  BP (!) 177/102   Pulse (!) 108   Temp 97.8 °F (36.6 °C) (Temporal)   Resp 18   Ht 1.88 m (6' 2\")   Wt (!) 145.2 kg (320 lb)   SpO2 95%   BMI 41.09 kg/m²     - General: No acute distress. Awake and conversant.  - CV: Regular rate.  - Respiratory: Respirations are non-labored   - Abdomen: Soft, non-tender, non-distended.  - Skin: Warm. No rashes or ulcers.  - Extremities: No cyanosis or edema.  Bilateral lower extremities wrapped.  Left upper extremity with Ex-Fix    Lab Results   Component Value Date    WBC 18.1 (H) 08/25/2024    HGB 12.5 08/25/2024    HCT 36.4 (L) 08/25/2024    MCV 92.6 08/25/2024     08/25/2024     Lab Results   Component Value Date     08/25/2024    K 4.5 08/25/2024    CL 99 08/25/2024    CO2 25 08/25/2024    BUN 20 08/25/2024    CREATININE 0.9 08/25/2024    GLUCOSE 143 (H) 08/25/2024    CALCIUM 8.4 (L) 08/25/2024    BILITOT 0.9 08/25/2024    ALKPHOS 73 08/25/2024     (H) 08/25/2024     (H) 08/25/2024    LABGLOM >90 08/25/2024       ASSESSMENT/PLAN:    34 y.o. male with status post fall from tree with left sixth rib fracture, right 6-7th rib fracture, open right distal radius fracture, closed olecranon fracture, open left distal radius fracture status post x-ray    Neuro: No acute issues. GCS 15. Pain control.  CV: No acute issues.    Pulm: Multiple right-sided fractures, aggressive pulmonary toilet, encourage IS/SMI.  GI: No acute issues. Bowel regimen. Zofran PRN.  LFTs downtrending continue to monitor  Renal: No acute issues.   Endocrine: No acute issues.  MSK: Bilateral upper extremity fracture, orthopedic following  Heme: No acute issues.  ID: No acute issues.     Pain/Analgesia:          Oxycodone, Robaxin, gabapentin  Bowel Regimen:        Senokot  DVT PPx:                    SCDs, Lovenox  GI PPx:                       pepcid       Code status:  Full Code     Disposition: Telemetry  -Discussed with Dr. Clarence Pablo DO  General Surgery Resident, PGY-1    Electronically signed by Carlos Alberto Pablo DO on 8/25/24 at 9:47 AM EDT

## 2024-08-26 ENCOUNTER — APPOINTMENT (OUTPATIENT)
Dept: CT IMAGING | Age: 34
DRG: 511 | End: 2024-08-26
Payer: COMMERCIAL

## 2024-08-26 PROBLEM — S52.501A CLOSED FRACTURE OF RIGHT DISTAL RADIUS: Status: ACTIVE | Noted: 2024-08-26

## 2024-08-26 PROBLEM — S22.49XA CLOSED FRACTURE OF MULTIPLE RIBS: Status: ACTIVE | Noted: 2024-08-26

## 2024-08-26 LAB
ALBUMIN SERPL-MCNC: 3.6 G/DL (ref 3.5–5.2)
ALP SERPL-CCNC: 79 U/L (ref 40–129)
ALT SERPL-CCNC: 540 U/L (ref 0–40)
ANION GAP SERPL CALCULATED.3IONS-SCNC: 9 MMOL/L (ref 7–16)
AST SERPL-CCNC: 160 U/L (ref 0–39)
BASOPHILS # BLD: 0.06 K/UL (ref 0–0.2)
BASOPHILS NFR BLD: 0 % (ref 0–2)
BILIRUB SERPL-MCNC: 0.7 MG/DL (ref 0–1.2)
BUN SERPL-MCNC: 15 MG/DL (ref 6–20)
CALCIUM SERPL-MCNC: 8.7 MG/DL (ref 8.6–10.2)
CHLORIDE SERPL-SCNC: 100 MMOL/L (ref 98–107)
CO2 SERPL-SCNC: 27 MMOL/L (ref 22–29)
CREAT SERPL-MCNC: 0.8 MG/DL (ref 0.7–1.2)
EOSINOPHIL # BLD: 0.05 K/UL (ref 0.05–0.5)
EOSINOPHILS RELATIVE PERCENT: 0 % (ref 0–6)
ERYTHROCYTE [DISTWIDTH] IN BLOOD BY AUTOMATED COUNT: 12.4 % (ref 11.5–15)
GFR, ESTIMATED: >90 ML/MIN/1.73M2
GLUCOSE SERPL-MCNC: 139 MG/DL (ref 74–99)
HCT VFR BLD AUTO: 34.8 % (ref 37–54)
HGB BLD-MCNC: 11.8 G/DL (ref 12.5–16.5)
IMM GRANULOCYTES # BLD AUTO: 0.08 K/UL (ref 0–0.58)
IMM GRANULOCYTES NFR BLD: 1 % (ref 0–5)
LYMPHOCYTES NFR BLD: 2.49 K/UL (ref 1.5–4)
LYMPHOCYTES RELATIVE PERCENT: 18 % (ref 20–42)
MCH RBC QN AUTO: 31.6 PG (ref 26–35)
MCHC RBC AUTO-ENTMCNC: 33.9 G/DL (ref 32–34.5)
MCV RBC AUTO: 93 FL (ref 80–99.9)
MONOCYTES NFR BLD: 1.35 K/UL (ref 0.1–0.95)
MONOCYTES NFR BLD: 10 % (ref 2–12)
NEUTROPHILS NFR BLD: 72 % (ref 43–80)
NEUTS SEG NFR BLD: 10.15 K/UL (ref 1.8–7.3)
PLATELET # BLD AUTO: 192 K/UL (ref 130–450)
PMV BLD AUTO: 10.7 FL (ref 7–12)
POTASSIUM SERPL-SCNC: 4.4 MMOL/L (ref 3.5–5)
PROT SERPL-MCNC: 6.6 G/DL (ref 6.4–8.3)
RBC # BLD AUTO: 3.74 M/UL (ref 3.8–5.8)
SODIUM SERPL-SCNC: 136 MMOL/L (ref 132–146)
WBC OTHER # BLD: 14.2 K/UL (ref 4.5–11.5)

## 2024-08-26 PROCEDURE — 6360000002 HC RX W HCPCS

## 2024-08-26 PROCEDURE — 6360000002 HC RX W HCPCS: Performed by: STUDENT IN AN ORGANIZED HEALTH CARE EDUCATION/TRAINING PROGRAM

## 2024-08-26 PROCEDURE — 94669 MECHANICAL CHEST WALL OSCILL: CPT

## 2024-08-26 PROCEDURE — 85025 COMPLETE CBC W/AUTO DIFF WBC: CPT

## 2024-08-26 PROCEDURE — 2060000000 HC ICU INTERMEDIATE R&B

## 2024-08-26 PROCEDURE — 6370000000 HC RX 637 (ALT 250 FOR IP): Performed by: STUDENT IN AN ORGANIZED HEALTH CARE EDUCATION/TRAINING PROGRAM

## 2024-08-26 PROCEDURE — 80053 COMPREHEN METABOLIC PANEL: CPT

## 2024-08-26 PROCEDURE — 94640 AIRWAY INHALATION TREATMENT: CPT

## 2024-08-26 PROCEDURE — 2580000003 HC RX 258: Performed by: STUDENT IN AN ORGANIZED HEALTH CARE EDUCATION/TRAINING PROGRAM

## 2024-08-26 PROCEDURE — 6370000000 HC RX 637 (ALT 250 FOR IP): Performed by: SURGERY

## 2024-08-26 PROCEDURE — 99232 SBSQ HOSP IP/OBS MODERATE 35: CPT | Performed by: SURGERY

## 2024-08-26 PROCEDURE — 6370000000 HC RX 637 (ALT 250 FOR IP)

## 2024-08-26 PROCEDURE — 73200 CT UPPER EXTREMITY W/O DYE: CPT

## 2024-08-26 PROCEDURE — 6370000000 HC RX 637 (ALT 250 FOR IP): Performed by: UROLOGY

## 2024-08-26 PROCEDURE — 97530 THERAPEUTIC ACTIVITIES: CPT

## 2024-08-26 RX ORDER — ENOXAPARIN SODIUM 100 MG/ML
40 INJECTION SUBCUTANEOUS 2 TIMES DAILY
Status: DISCONTINUED | OUTPATIENT
Start: 2024-08-26 | End: 2024-09-03 | Stop reason: HOSPADM

## 2024-08-26 RX ORDER — SODIUM CHLORIDE 9 MG/ML
INJECTION, SOLUTION INTRAVENOUS CONTINUOUS
Status: DISCONTINUED | OUTPATIENT
Start: 2024-08-27 | End: 2024-08-30

## 2024-08-26 RX ORDER — OXYCODONE HYDROCHLORIDE 10 MG/1
10 TABLET ORAL EVERY 4 HOURS PRN
Status: DISCONTINUED | OUTPATIENT
Start: 2024-08-26 | End: 2024-09-03 | Stop reason: HOSPADM

## 2024-08-26 RX ORDER — OXYCODONE HYDROCHLORIDE 15 MG/1
15 TABLET ORAL EVERY 4 HOURS PRN
Status: DISCONTINUED | OUTPATIENT
Start: 2024-08-26 | End: 2024-09-03 | Stop reason: HOSPADM

## 2024-08-26 RX ORDER — GABAPENTIN 300 MG/1
300 CAPSULE ORAL 3 TIMES DAILY
Status: DISCONTINUED | OUTPATIENT
Start: 2024-08-27 | End: 2024-09-03 | Stop reason: HOSPADM

## 2024-08-26 RX ADMIN — SODIUM CHLORIDE, PRESERVATIVE FREE 10 ML: 5 INJECTION INTRAVENOUS at 21:39

## 2024-08-26 RX ADMIN — METHOCARBAMOL TABLETS 1000 MG: 500 TABLET, COATED ORAL at 16:53

## 2024-08-26 RX ADMIN — OXYCODONE HYDROCHLORIDE 10 MG: 10 TABLET ORAL at 13:53

## 2024-08-26 RX ADMIN — ARFORMOTEROL TARTRATE: 15 SOLUTION RESPIRATORY (INHALATION) at 18:43

## 2024-08-26 RX ADMIN — OXYCODONE HYDROCHLORIDE 10 MG: 10 TABLET ORAL at 04:38

## 2024-08-26 RX ADMIN — MORPHINE SULFATE 4 MG: 4 INJECTION, SOLUTION INTRAMUSCULAR; INTRAVENOUS at 03:27

## 2024-08-26 RX ADMIN — MORPHINE SULFATE 4 MG: 4 INJECTION, SOLUTION INTRAMUSCULAR; INTRAVENOUS at 07:47

## 2024-08-26 RX ADMIN — MORPHINE SULFATE 4 MG: 4 INJECTION, SOLUTION INTRAMUSCULAR; INTRAVENOUS at 01:23

## 2024-08-26 RX ADMIN — ENOXAPARIN SODIUM 40 MG: 100 INJECTION SUBCUTANEOUS at 08:59

## 2024-08-26 RX ADMIN — MORPHINE SULFATE 4 MG: 4 INJECTION, SOLUTION INTRAMUSCULAR; INTRAVENOUS at 16:54

## 2024-08-26 RX ADMIN — ARFORMOTEROL TARTRATE: 15 SOLUTION RESPIRATORY (INHALATION) at 09:31

## 2024-08-26 RX ADMIN — MORPHINE SULFATE 4 MG: 4 INJECTION, SOLUTION INTRAMUSCULAR; INTRAVENOUS at 10:43

## 2024-08-26 RX ADMIN — TAMSULOSIN HYDROCHLORIDE 0.4 MG: 0.4 CAPSULE ORAL at 21:37

## 2024-08-26 RX ADMIN — METHOCARBAMOL TABLETS 1000 MG: 500 TABLET, COATED ORAL at 21:37

## 2024-08-26 RX ADMIN — OXYCODONE HYDROCHLORIDE 15 MG: 15 TABLET ORAL at 18:23

## 2024-08-26 RX ADMIN — MORPHINE SULFATE 4 MG: 4 INJECTION, SOLUTION INTRAMUSCULAR; INTRAVENOUS at 19:27

## 2024-08-26 RX ADMIN — GABAPENTIN 300 MG: 600 TABLET, COATED ORAL at 08:59

## 2024-08-26 RX ADMIN — WATER 1000 MG: 1 INJECTION INTRAMUSCULAR; INTRAVENOUS; SUBCUTANEOUS at 08:58

## 2024-08-26 RX ADMIN — OXYCODONE HYDROCHLORIDE 10 MG: 10 TABLET ORAL at 08:59

## 2024-08-26 RX ADMIN — GABAPENTIN 300 MG: 600 TABLET, COATED ORAL at 13:53

## 2024-08-26 RX ADMIN — MORPHINE SULFATE 4 MG: 4 INJECTION, SOLUTION INTRAMUSCULAR; INTRAVENOUS at 05:33

## 2024-08-26 RX ADMIN — METHOCARBAMOL TABLETS 1000 MG: 500 TABLET, COATED ORAL at 08:58

## 2024-08-26 RX ADMIN — ENOXAPARIN SODIUM 40 MG: 100 INJECTION SUBCUTANEOUS at 21:38

## 2024-08-26 RX ADMIN — ACETAMINOPHEN 650 MG: 325 TABLET ORAL at 03:27

## 2024-08-26 RX ADMIN — ACETAMINOPHEN 650 MG: 325 TABLET ORAL at 16:53

## 2024-08-26 RX ADMIN — AMLODIPINE BESYLATE 5 MG: 5 TABLET ORAL at 08:59

## 2024-08-26 RX ADMIN — OXYCODONE HYDROCHLORIDE 10 MG: 10 TABLET ORAL at 00:33

## 2024-08-26 RX ADMIN — MORPHINE SULFATE 4 MG: 4 INJECTION, SOLUTION INTRAMUSCULAR; INTRAVENOUS at 12:35

## 2024-08-26 RX ADMIN — SODIUM CHLORIDE, PRESERVATIVE FREE 10 ML: 5 INJECTION INTRAVENOUS at 09:00

## 2024-08-26 RX ADMIN — MORPHINE SULFATE 4 MG: 4 INJECTION, SOLUTION INTRAMUSCULAR; INTRAVENOUS at 21:38

## 2024-08-26 RX ADMIN — ACETAMINOPHEN 650 MG: 325 TABLET ORAL at 21:37

## 2024-08-26 RX ADMIN — ACETAMINOPHEN 650 MG: 325 TABLET ORAL at 09:00

## 2024-08-26 RX ADMIN — SODIUM CHLORIDE, PRESERVATIVE FREE 10 ML: 5 INJECTION INTRAVENOUS at 07:48

## 2024-08-26 RX ADMIN — OXYCODONE HYDROCHLORIDE 10 MG: 10 TABLET ORAL at 22:49

## 2024-08-26 RX ADMIN — MORPHINE SULFATE 4 MG: 4 INJECTION, SOLUTION INTRAMUSCULAR; INTRAVENOUS at 14:36

## 2024-08-26 RX ADMIN — METHOCARBAMOL TABLETS 1000 MG: 500 TABLET, COATED ORAL at 12:34

## 2024-08-26 RX ADMIN — GABAPENTIN 300 MG: 600 TABLET, COATED ORAL at 21:37

## 2024-08-26 ASSESSMENT — PAIN DESCRIPTION - LOCATION
LOCATION: ARM
LOCATION: ARM
LOCATION: WRIST;RIB CAGE
LOCATION: WRIST
LOCATION: ARM
LOCATION: WRIST
LOCATION: WRIST;RIB CAGE
LOCATION: ARM
LOCATION: WRIST
LOCATION: ARM
LOCATION: WRIST;RIB CAGE
LOCATION: WRIST

## 2024-08-26 ASSESSMENT — PAIN SCALES - GENERAL
PAINLEVEL_OUTOF10: 7
PAINLEVEL_OUTOF10: 8
PAINLEVEL_OUTOF10: 6
PAINLEVEL_OUTOF10: 8
PAINLEVEL_OUTOF10: 9
PAINLEVEL_OUTOF10: 10
PAINLEVEL_OUTOF10: 6
PAINLEVEL_OUTOF10: 8
PAINLEVEL_OUTOF10: 10
PAINLEVEL_OUTOF10: 6
PAINLEVEL_OUTOF10: 8
PAINLEVEL_OUTOF10: 10
PAINLEVEL_OUTOF10: 10
PAINLEVEL_OUTOF10: 7
PAINLEVEL_OUTOF10: 10
PAINLEVEL_OUTOF10: 10
PAINLEVEL_OUTOF10: 8
PAINLEVEL_OUTOF10: 10
PAINLEVEL_OUTOF10: 8
PAINLEVEL_OUTOF10: 9
PAINLEVEL_OUTOF10: 7

## 2024-08-26 ASSESSMENT — PAIN - FUNCTIONAL ASSESSMENT
PAIN_FUNCTIONAL_ASSESSMENT: PREVENTS OR INTERFERES WITH MANY ACTIVE NOT PASSIVE ACTIVITIES
PAIN_FUNCTIONAL_ASSESSMENT: PREVENTS OR INTERFERES SOME ACTIVE ACTIVITIES AND ADLS
PAIN_FUNCTIONAL_ASSESSMENT: PREVENTS OR INTERFERES SOME ACTIVE ACTIVITIES AND ADLS

## 2024-08-26 ASSESSMENT — PAIN DESCRIPTION - DESCRIPTORS
DESCRIPTORS: ACHING;DISCOMFORT;SORE
DESCRIPTORS: ACHING;DISCOMFORT;DULL
DESCRIPTORS: ACHING;DISCOMFORT
DESCRIPTORS: ACHING;DISCOMFORT;SORE
DESCRIPTORS: ACHING;DISCOMFORT

## 2024-08-26 ASSESSMENT — PAIN DESCRIPTION - ORIENTATION
ORIENTATION: RIGHT;LEFT
ORIENTATION: LEFT;RIGHT
ORIENTATION: LEFT;RIGHT
ORIENTATION: RIGHT;LEFT

## 2024-08-26 NOTE — ACP (ADVANCE CARE PLANNING)
Advance Care Planning   Healthcare Decision Maker:    Primary Decision Maker: Smiley Freedman - [facility] - [phone]    Click here to complete Healthcare Decision Makers including selection of the Healthcare Decision Maker Relationship (ie \"Primary\").               Addie Millard RN.

## 2024-08-26 NOTE — PROGRESS NOTES
Occupational Therapy     Date:2024  Patient Name: Cheikh Freedman  MRN: [id number]  : 1990  Room: [address] [place] [address]     Completed chart review & attempted to see pt with pt reports he is in too much pain at this time, requesting to work with therapy at another time. Pt also was sleeping heavily and having difficulty waking up. Will attempt to see pt at another time.    MANDEEP Shultz/L [id number]

## 2024-08-26 NOTE — PROGRESS NOTES
TRAUMA SURGERY  DAILY PROGRESS NOTE    Patient's Name/Date of Birth: Cheikh Freedman / 1990    Date: 2024     Chief Complaint   Patient presents with    Trauma        Subjective:  Resting in chair. States he has some pain in right groin, still able to move leg.       Objective:  Last 24Hrs  Temp  Av.9 °F (36.6 °C)  Min: 97.6 °F (36.4 °C)  Max: 98.4 °F (36.9 °C)  Resp  Av.6  Min: 16  Max: 18  Pulse  Av.8  Min: 103  Max: 111  Systolic (24hrs), Avg:160 , Min:136 , Max:177     Diastolic (24hrs), Av, Min:79, Max:108    SpO2  Av.6 %  Min: 95 %  Max: 99 %    I/O last 3 completed shifts:  In: -   Out: 5180 [Urine:5180]      General: resting in chair  Cardiovascular: Warm throughout, no edema  Respiratory: no respiratory distress, equal chest rise. SMI 2500  Abdomen: soft,  nontender, nondistended  Skin: no obvious rashes or lesions appreciated, no jaundice  Extremities: bilateral upper extremities in splint, ex fix on LUE, motor and sensation intact, moving bilateral LE      CBC  Recent Labs     24  0515 24  0540 24  0535   WBC 17.9* 18.1* 14.2*   RBC 4.39 3.93 3.74*   HGB 14.0 12.5 11.8*   HCT 41.3 36.4* 34.8*   MCV 94.1 92.6 93.0   MCH 31.9 31.8 31.6   MCHC 33.9 34.3 33.9   RDW 12.7 12.5 12.4    202 192   MPV 10.9 10.5 10.7       CMP  Recent Labs     24  0515 24  0540 24  0535    133 136   K 4.6 4.5 4.4    99 100   CO2 23 25 27   BUN 20 20 15   CREATININE 0.9 0.9 0.8   GLUCOSE 154* 143* 139*   CALCIUM 8.6 8.4* 8.7   BILITOT 0.6 0.9 0.7   ALKPHOS 80 73 79   AST 1,005* 472* 160*   * 828* 540*         Assessment/Plan:    Patient Active Problem List   Diagnosis    Fall from tree, initial encounter    Poor venous access    Open fracture of distal end of radius    Closed fracture of right olecranon process    Closed fracture of multiple ribs of both sides       34 y.o. male s/p fall from tree with L 6 rib fx, R 6-7 rib, open R

## 2024-08-26 NOTE — PROGRESS NOTES
Physical Therapy  Treatment Note    Name: Cheikh Freedman  : 1990  MRN: [id number]      Date of Service: 2024    Evaluating PT: Mak Chase, PT, DPT [id number]      Room #:  8516/8516-B  Diagnosis:  Poor venous access [I87.8]  Fall from tree, initial encounter [W14.XXXA]  PMHx/PSHx:   has no past medical history on file.  Procedure/Surgery:  Left distal radius I&D with external fixator application; right distal radius I&D with closed reduction and splinting; right olecranon closed reduction and splinting   Precautions:  Fall risk, NWB B UE, L UE ex fix, R rib Fx    SUBJECTIVE:    Pt lives with wife and 3 children in a single story house with 1 stair(s) and no rail(s) to enter. Pt ambulated without AD prior to admission.    OBJECTIVE:   Initial Evaluation  Date: 24 Treatment Date: 24 Short Term/ Long Term   Goals   AM-PAC 6 Clicks     Was pt agreeable to Eval/treatment? Yes Yes    Does pt have pain? R elbow and groin pain 6/10 R hip pain  4/10 LUE  3/10 RUE  7/10 R rib     Bed Mobility  Rolling: NT  Supine to sit: Mark  Sit to supine: NT  Scooting: Mark to EOB Rolling: NT  Supine to sit: Mark  Sit to supine: NT  Scooting: Mark to EOB Rolling: Independent   Supine to sit: Independent   Sit to supine: Independent   Scooting: Independent    Transfers Sit to stand: Mark  Stand to sit: Mark  Stand pivot: Mark without AD Sit to stand: Mark  Stand to sit: Mark  Stand pivot: Mark without AD Sit to stand: Independent   Stand to sit: Independent   Stand pivot: Independent    Ambulation   50 feet without AD with Mark 75 feet x2, 40 feet x2 with no AD and Mark >200 feet without AD Independent    Stair negotiation: ascended and descended NT NT 4 steps with no rail Independent    ROM BUE: Refer to OT note  BLE: WFL     Strength BUE: Refer to OT note  BLE: WFL     Balance Sitting EOB: SBA  Dynamic Standing: Mark without AD Sitting EOB: SBA  Dynamic Standing: Mark without AD Sitting EOB:

## 2024-08-26 NOTE — PROGRESS NOTES
Perfect serve trauma resident if pt can downgrade to [facility].

## 2024-08-26 NOTE — CARE COORDINATION
Chart reviewed and case reviewed in IDR.  Patient admitted s/p fall from a tree while harnessed in.  Patient was not working at the time of the incident per his wife.  Patient post op: 1. Irrigation and debridement of left open grade 3 distal radius fracture with application of external fixator; 2. Irrigation debridement of right open grade 2 distal radius fracture with closed reduction and splint application; 3. Closed treatment of olecranon fracture.  With ortho surgery 8/23.  Met with the patient and his wife at the bedside to discuss transition of care planning.  Patient lives with his wife in a one story home with 1+2 step entry.  Patient and no DME and a history of outpatient therapy.  Patient's PCP is Dr Pulliam and they use [facility] for their medications.  Patient would benefit from a short stay at rehab prior to returning home.  PM&R consult obtained and called.  Dr Felix to evaluate the patient.  Await final plans from orthopedic surgery as well.  Will continue to follow for further transition of care planning.     Addie Millard RN.  P:  [phone]      Case Management Assessment  Initial Evaluation    Date/Time of Evaluation: 8/26/2024 1:14 PM  Assessment Completed by: Addie Millard RN    If patient is discharged prior to next notation, then this note serves as note for discharge by case management.    Patient Name: Cheikh Freedman                   YOB: 1990  Diagnosis: Poor venous access [I87.8]  Fall from tree, initial encounter [W14.XXXA]                   Date / Time: 8/23/2024  8:50 PM    Patient Admission Status: Inpatient   Readmission Risk (Low < 19, Mod (19-27), High > 27): Readmission Risk Score: 8    Current PCP: Zach Pulliam MD  PCP verified by CM? Yes (Zach Pulliam MD)    Chart Reviewed: Yes      History Provided by: Patient  Patient Orientation: Alert and Oriented    Patient Cognition: Alert    Hospitalization in the last 30 days (Readmission):  No     If yes, Readmission Assessment in  Navigator will be completed.    Advance Directives:      Code Status: Full Code   Patient's Primary Decision Maker is: Legal Next of Kin    Primary Decision Maker: Smiley Freedman - Spouse - [phone]    Discharge Planning:    Patient lives with: Spouse/Significant Other Type of Home: House  Primary Care Giver: Self  Patient Support Systems include: Spouse/Significant Other, Family Members, Parent   Current Financial resources:    Current community resources:    Current services prior to admission: None            Current DME:              Type of Home Care services:  None    ADLS  Prior functional level: Independent in ADLs/IADLs  Current functional level: Assistance with the following:, Shopping, Housework, Bathing, Dressing, Toileting, Feeding, Cooking    PT AM-PAC: 16 /24  OT AM-PAC: 12 /24    Family can provide assistance at DC: Yes  Would you like Case Management to discuss the discharge plan with any other family members/significant others, and if so, who? Yes  Plans to Return to Present Housing: Unknown at present  Other Identified Issues/Barriers to RETURNING to current housing: rehab needs  Potential Assistance needed at discharge: N/A            Potential DME:    Patient expects to discharge to: House  Plan for transportation at discharge:      Financial    Payor: [employer] / Plan: [employer] PAYER [id number] / Product Type: *No Product type* /     Does insurance require precert for SNF: Yes    Potential assistance Purchasing Medications: No  Meds-to-Beds request:        [id number] - [place], [place] - [address] - P [phone] - F [phone]  [address] [place] [address]  Phone: [phone] Fax: [phone]      Notes:    Factors facilitating achievement of predicted outcomes: Family support, Motivated, Cooperative, and Pleasant    Barriers to discharge: Anxiety, Upper extremity weakness, and Stairs at home    Additional Case Management

## 2024-08-26 NOTE — PROGRESS NOTES
SUBJECTIVE:  su in place  Pt still not mobile  To have arm surg today    OBJECTIVE:    BP (!) 188/121   Pulse (!) 110   Temp 98 °F (36.7 °C) (Oral)   Resp 20   Ht 1.88 m (6' 2\")   Wt (!) 145.2 kg (320 lb)   SpO2 97%   BMI 41.09 kg/m²     PHYSICAL EXAMINATION:  Skin: dry, without rashes  Respirations: non-labored, intact  Abdomen: soft, non-tender, non-distended      Lab Results   Component Value Date    WBC 14.2 (H) 08/26/2024    HGB 11.8 (L) 08/26/2024    HCT 34.8 (L) 08/26/2024    MCV 93.0 08/26/2024     08/26/2024       Lab Results   Component Value Date    CREATININE 0.8 08/26/2024       No results found for: \"PSA\"    REVIEW OF SYSTEMS:    [unfilled]    PAST FAMILY HISTORY:  History reviewed. No pertinent family history.  PAST SOCIAL HISTORY:    Social History     Socioeconomic History    Marital status:      Spouse name: None    Number of children: None    Years of education: None    Highest education level: None   Tobacco Use    Smoking status: Never    Smokeless tobacco: Never     Social Determinants of Health     Food Insecurity: No Food Insecurity (8/24/2024)    Hunger Vital Sign     Worried About Running Out of Food in the Last Year: Never true     Ran Out of Food in the Last Year: Never true   Transportation Needs: No Transportation Needs (8/24/2024)    PRAPARE - Transportation     Lack of Transportation (Medical): No     Lack of Transportation (Non-Medical): No   Housing Stability: Low Risk  (8/24/2024)    Housing Stability Vital Sign     Unable to Pay for Housing in the Last Year: No     Number of Times Moved in the Last Year: 1     Homeless in the Last Year: No       Scheduled Meds:   tamsulosin  0.4 mg Oral Nightly    arformoterol 15 mcg-budesonide 0.25 mg neb solution   Nebulization BID RT    [Held by provider] amphetamine-dextroamphetamine  20 mg Oral Daily    amLODIPine  5 mg Oral Daily    sodium

## 2024-08-26 NOTE — PROGRESS NOTES
Department of Orthopedic Surgery  Resident Progress Note    Patient seen and examined sitting in the chair at bedside today no new concerns or complaints. Doing well overall this morning. No numbness or tingling.     VITALS:  /88   Pulse (!) 110   Temp 97.6 °F (36.4 °C) (Temporal)   Resp 18   Ht 1.88 m (6' 2\")   Wt (!) 145.2 kg (320 lb)   SpO2 99%   BMI 41.09 kg/m²     General: Awake alert in no acute distress    MUSCULOSKELETAL:   left upper extremity:  Dressing C/D/I external fixator pins in place without significant drainage  Compartments soft and compressible  Although limited in range of motion and strength motor function grossly intact to AIN/PIN/ulnar/median and radial nerves   Brisk capillary refill, warm and well-perfused  Subjectively states sensation intact to radial, ulnar, median nerve distributions hand and fingers and equal to contralateral side    Right upper extremity:  Dressing clean dry and intact with sugar-tong splint and posterior slab splint in place that are well-padded.  No significant drainage at this point  Compartment soft compressible  Maintains motor function to AIN/PIN/ulnar/median and radial nerves  Brisk capillary refill, warm and well-perfused  Subjectively states sensation intact to light touch in radial, ulnar, median nerve distributions of the hand and fingers    CBC:   Lab Results   Component Value Date/Time    WBC 14.2 08/26/2024 05:35 AM    HGB 11.8 08/26/2024 05:35 AM    HCT 34.8 08/26/2024 05:35 AM     08/26/2024 05:35 AM     PT/INR:    Lab Results   Component Value Date/Time    PROTIME 10.4 08/23/2024 09:05 PM    INR 1.0 08/23/2024 09:05 PM       ASSESSMENT  S/P left distal radius I&D with external fixator application; right distal radius I&D with closed reduction and splinting; right olecranon closed reduction and splinting 8/24/24    PLAN      Continue physical therapy and protocol: Nonweightbearing bilateral upper extremities  Postoperative antibiotic

## 2024-08-26 NOTE — PLAN OF CARE
Problem: Discharge Planning  Goal: Discharge to home or other facility with appropriate resources  Outcome: Progressing     Problem: Pain  Goal: Verbalizes/displays adequate comfort level or baseline comfort level  Outcome: Progressing     Problem: Safety - Adult  Goal: Free from fall injury  Outcome: Progressing     Problem: ABCDS Injury Assessment  Goal: Absence of physical injury  Outcome: Progressing  Flowsheets (Taken 8/25/2024 2138)  Absence of Physical Injury: Implement safety measures based on patient assessment     Problem: Skin/Tissue Integrity  Goal: Absence of new skin breakdown  Description: 1.  Monitor for areas of redness and/or skin breakdown  2.  Assess vascular access sites hourly  3.  Every 4-6 hours minimum:  Change oxygen saturation probe site  4.  Every 4-6 hours:  If on nasal continuous positive airway pressure, respiratory therapy assess nares and determine need for appliance change or resting period.  Outcome: Progressing

## 2024-08-26 NOTE — CONSULTS
[facility]              [address] [place] [address]                              CONSULTATION      PATIENT NAME: ADDIS HOBBS            : 1990  MED REC NO: [id number]                        ROOM: 8516  ACCOUNT NO: [id number]                       ADMIT DATE: 2024  PROVIDER: Duane Felix MD    REHAB CONSULT    CONSULT DATE: 2024      CHIEF COMPLAINT:  Multiple trauma.    HISTORY OF PRESENT ILLNESS:  The patient was trimming a tree away from the roof on his property when he had a fall.  He fractured both of his arms as well as multiple ribs.  He had a grade 3 open left distal radius fracture, a grade 2 open right distal radius fracture, a right olecranon fracture, and rib fractures as noted.  He had irrigation and debridement of both.  He has an external fixator on the left and a splint on the right.  He is planned for a surgery, I believe, on the left side tomorrow to remove the ex fix and put on internal fixation and then he tells me he is going to have another surgery done on the right side, but he is not sure when.  He is nonweightbearing on both uppers, but is allowed weightbearing on his lowers.  He also has urinary retention and has a Montes in place, although there was no apparent pelvic injury.  He has been seen by Therapy.  He was minimal assist to stand, minimal assist to ambulate for short distances.  He was maximal assist for his ADLs because of limited use of his uppers.    PAST MEDICAL HISTORY:  No prior medical problems other than morbid obesity.    ALLERGIES:  NONE KNOWN.      CURRENT MEDICATIONS:  Norvasc, Adderall, Lovenox, gabapentin, Robaxin, and oxycodone as needed for pain.    SOCIAL HISTORY:  He lives with his wife and children.    PHYSICAL EXAMINATION:  GENERAL:  Morbidly obese white male, in no acute distress.  HEAD:  Normocephalic, atraumatic.  EYES:  Pupils equal, round, reactive to light.  PHARYNX:
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
## 2024-08-27 ENCOUNTER — APPOINTMENT (OUTPATIENT)
Dept: GENERAL RADIOLOGY | Age: 34
DRG: 511 | End: 2024-08-27
Payer: COMMERCIAL

## 2024-08-27 ENCOUNTER — ANESTHESIA EVENT (OUTPATIENT)
Dept: OPERATING ROOM | Age: 34
End: 2024-08-27
Payer: COMMERCIAL

## 2024-08-27 ENCOUNTER — TELEPHONE (OUTPATIENT)
Dept: ORTHOPEDIC SURGERY | Age: 34
End: 2024-08-27

## 2024-08-27 ENCOUNTER — ANESTHESIA (OUTPATIENT)
Dept: OPERATING ROOM | Age: 34
End: 2024-08-27
Payer: COMMERCIAL

## 2024-08-27 PROBLEM — S42.401A CLOSED FRACTURE OF RIGHT ELBOW: Status: ACTIVE | Noted: 2024-08-23

## 2024-08-27 LAB
ABO + RH BLD: NORMAL
ALBUMIN SERPL-MCNC: 3.3 G/DL (ref 3.5–5.2)
ALP SERPL-CCNC: 89 U/L (ref 40–129)
ALT SERPL-CCNC: 376 U/L (ref 0–40)
ANION GAP SERPL CALCULATED.3IONS-SCNC: 10 MMOL/L (ref 7–16)
ARM BAND NUMBER: NORMAL
AST SERPL-CCNC: 106 U/L (ref 0–39)
BASOPHILS # BLD: 0.05 K/UL (ref 0–0.2)
BASOPHILS NFR BLD: 0 % (ref 0–2)
BILIRUB SERPL-MCNC: 5.4 MG/DL (ref 0–1.2)
BLOOD BANK SAMPLE EXPIRATION: NORMAL
BLOOD GROUP ANTIBODIES SERPL: NEGATIVE
BUN SERPL-MCNC: 11 MG/DL (ref 6–20)
CALCIUM SERPL-MCNC: 8.4 MG/DL (ref 8.6–10.2)
CHLORIDE SERPL-SCNC: 98 MMOL/L (ref 98–107)
CO2 SERPL-SCNC: 27 MMOL/L (ref 22–29)
CREAT SERPL-MCNC: 0.6 MG/DL (ref 0.7–1.2)
EOSINOPHIL # BLD: 0.2 K/UL (ref 0.05–0.5)
EOSINOPHILS RELATIVE PERCENT: 2 % (ref 0–6)
ERYTHROCYTE [DISTWIDTH] IN BLOOD BY AUTOMATED COUNT: 12.5 % (ref 11.5–15)
GFR, ESTIMATED: >90 ML/MIN/1.73M2
GLUCOSE SERPL-MCNC: 130 MG/DL (ref 74–99)
HCT VFR BLD AUTO: 32.3 % (ref 37–54)
HGB BLD-MCNC: 11.1 G/DL (ref 12.5–16.5)
IMM GRANULOCYTES # BLD AUTO: 0.12 K/UL (ref 0–0.58)
IMM GRANULOCYTES NFR BLD: 1 % (ref 0–5)
LYMPHOCYTES NFR BLD: 1.74 K/UL (ref 1.5–4)
LYMPHOCYTES RELATIVE PERCENT: 15 % (ref 20–42)
MCH RBC QN AUTO: 31.6 PG (ref 26–35)
MCHC RBC AUTO-ENTMCNC: 34.4 G/DL (ref 32–34.5)
MCV RBC AUTO: 92 FL (ref 80–99.9)
MONOCYTES NFR BLD: 1.02 K/UL (ref 0.1–0.95)
MONOCYTES NFR BLD: 9 % (ref 2–12)
NEUTROPHILS NFR BLD: 73 % (ref 43–80)
NEUTS SEG NFR BLD: 8.54 K/UL (ref 1.8–7.3)
PLATELET # BLD AUTO: 175 K/UL (ref 130–450)
PMV BLD AUTO: 10.5 FL (ref 7–12)
POTASSIUM SERPL-SCNC: 4.4 MMOL/L (ref 3.5–5)
PROT SERPL-MCNC: 6 G/DL (ref 6.4–8.3)
RBC # BLD AUTO: 3.51 M/UL (ref 3.8–5.8)
SODIUM SERPL-SCNC: 135 MMOL/L (ref 132–146)
WBC OTHER # BLD: 11.7 K/UL (ref 4.5–11.5)

## 2024-08-27 PROCEDURE — 6360000002 HC RX W HCPCS

## 2024-08-27 PROCEDURE — 99232 SBSQ HOSP IP/OBS MODERATE 35: CPT | Performed by: SURGERY

## 2024-08-27 PROCEDURE — 6370000000 HC RX 637 (ALT 250 FOR IP)

## 2024-08-27 PROCEDURE — 11012 DEB SKIN BONE AT FX SITE: CPT | Performed by: STUDENT IN AN ORGANIZED HEALTH CARE EDUCATION/TRAINING PROGRAM

## 2024-08-27 PROCEDURE — 7100000001 HC PACU RECOVERY - ADDTL 15 MIN: Performed by: STUDENT IN AN ORGANIZED HEALTH CARE EDUCATION/TRAINING PROGRAM

## 2024-08-27 PROCEDURE — 24670 CLTX ULNAR FX PROX W/O MNPJ: CPT | Performed by: STUDENT IN AN ORGANIZED HEALTH CARE EDUCATION/TRAINING PROGRAM

## 2024-08-27 PROCEDURE — 2580000003 HC RX 258

## 2024-08-27 PROCEDURE — 0PBJ0ZZ EXCISION OF LEFT RADIUS, OPEN APPROACH: ICD-10-PCS | Performed by: STUDENT IN AN ORGANIZED HEALTH CARE EDUCATION/TRAINING PROGRAM

## 2024-08-27 PROCEDURE — 6370000000 HC RX 637 (ALT 250 FOR IP): Performed by: STUDENT IN AN ORGANIZED HEALTH CARE EDUCATION/TRAINING PROGRAM

## 2024-08-27 PROCEDURE — 3700000001 HC ADD 15 MINUTES (ANESTHESIA): Performed by: STUDENT IN AN ORGANIZED HEALTH CARE EDUCATION/TRAINING PROGRAM

## 2024-08-27 PROCEDURE — 85025 COMPLETE CBC W/AUTO DIFF WBC: CPT

## 2024-08-27 PROCEDURE — 7100000000 HC PACU RECOVERY - FIRST 15 MIN: Performed by: STUDENT IN AN ORGANIZED HEALTH CARE EDUCATION/TRAINING PROGRAM

## 2024-08-27 PROCEDURE — 2580000003 HC RX 258: Performed by: STUDENT IN AN ORGANIZED HEALTH CARE EDUCATION/TRAINING PROGRAM

## 2024-08-27 PROCEDURE — 86900 BLOOD TYPING SEROLOGIC ABO: CPT

## 2024-08-27 PROCEDURE — 2580000003 HC RX 258: Performed by: NURSE ANESTHETIST, CERTIFIED REGISTERED

## 2024-08-27 PROCEDURE — 80053 COMPREHEN METABOLIC PANEL: CPT

## 2024-08-27 PROCEDURE — 25605 CLTX DST RDL FX/EPHYS SEP W/: CPT | Performed by: STUDENT IN AN ORGANIZED HEALTH CARE EDUCATION/TRAINING PROGRAM

## 2024-08-27 PROCEDURE — 6360000002 HC RX W HCPCS: Performed by: NURSE ANESTHETIST, CERTIFIED REGISTERED

## 2024-08-27 PROCEDURE — 86850 RBC ANTIBODY SCREEN: CPT

## 2024-08-27 PROCEDURE — 3600000004 HC SURGERY LEVEL 4 BASE: Performed by: STUDENT IN AN ORGANIZED HEALTH CARE EDUCATION/TRAINING PROGRAM

## 2024-08-27 PROCEDURE — 6360000002 HC RX W HCPCS: Performed by: STUDENT IN AN ORGANIZED HEALTH CARE EDUCATION/TRAINING PROGRAM

## 2024-08-27 PROCEDURE — 1200000000 HC SEMI PRIVATE

## 2024-08-27 PROCEDURE — 2700000000 HC OXYGEN THERAPY PER DAY

## 2024-08-27 PROCEDURE — 2W3CX1Z IMMOBILIZATION OF RIGHT LOWER ARM USING SPLINT: ICD-10-PCS | Performed by: STUDENT IN AN ORGANIZED HEALTH CARE EDUCATION/TRAINING PROGRAM

## 2024-08-27 PROCEDURE — 2709999900 HC NON-CHARGEABLE SUPPLY: Performed by: STUDENT IN AN ORGANIZED HEALTH CARE EDUCATION/TRAINING PROGRAM

## 2024-08-27 PROCEDURE — 3700000000 HC ANESTHESIA ATTENDED CARE: Performed by: STUDENT IN AN ORGANIZED HEALTH CARE EDUCATION/TRAINING PROGRAM

## 2024-08-27 PROCEDURE — 3600000014 HC SURGERY LEVEL 4 ADDTL 15MIN: Performed by: STUDENT IN AN ORGANIZED HEALTH CARE EDUCATION/TRAINING PROGRAM

## 2024-08-27 PROCEDURE — 86901 BLOOD TYPING SEROLOGIC RH(D): CPT

## 2024-08-27 PROCEDURE — 0PBH0ZZ EXCISION OF RIGHT RADIUS, OPEN APPROACH: ICD-10-PCS | Performed by: STUDENT IN AN ORGANIZED HEALTH CARE EDUCATION/TRAINING PROGRAM

## 2024-08-27 PROCEDURE — 6370000000 HC RX 637 (ALT 250 FOR IP): Performed by: SURGERY

## 2024-08-27 PROCEDURE — 2500000003 HC RX 250 WO HCPCS: Performed by: NURSE ANESTHETIST, CERTIFIED REGISTERED

## 2024-08-27 RX ORDER — LIDOCAINE HYDROCHLORIDE 20 MG/ML
INJECTION, SOLUTION INTRAVENOUS PRN
Status: DISCONTINUED | OUTPATIENT
Start: 2024-08-27 | End: 2024-08-27 | Stop reason: SDUPTHER

## 2024-08-27 RX ORDER — HYDROMORPHONE HYDROCHLORIDE 1 MG/ML
0.5 INJECTION, SOLUTION INTRAMUSCULAR; INTRAVENOUS; SUBCUTANEOUS EVERY 5 MIN PRN
Status: DISCONTINUED | OUTPATIENT
Start: 2024-08-27 | End: 2024-08-27 | Stop reason: HOSPADM

## 2024-08-27 RX ORDER — CEFAZOLIN SODIUM 1 G/3ML
INJECTION, POWDER, FOR SOLUTION INTRAMUSCULAR; INTRAVENOUS PRN
Status: DISCONTINUED | OUTPATIENT
Start: 2024-08-27 | End: 2024-08-27 | Stop reason: SDUPTHER

## 2024-08-27 RX ORDER — ROCURONIUM BROMIDE 10 MG/ML
INJECTION, SOLUTION INTRAVENOUS PRN
Status: DISCONTINUED | OUTPATIENT
Start: 2024-08-27 | End: 2024-08-27 | Stop reason: SDUPTHER

## 2024-08-27 RX ORDER — HYDROMORPHONE HYDROCHLORIDE 1 MG/ML
0.25 INJECTION, SOLUTION INTRAMUSCULAR; INTRAVENOUS; SUBCUTANEOUS EVERY 5 MIN PRN
Status: DISCONTINUED | OUTPATIENT
Start: 2024-08-27 | End: 2024-08-27 | Stop reason: HOSPADM

## 2024-08-27 RX ORDER — SODIUM CHLORIDE 9 MG/ML
INJECTION, SOLUTION INTRAVENOUS PRN
Status: DISCONTINUED | OUTPATIENT
Start: 2024-08-27 | End: 2024-08-27 | Stop reason: HOSPADM

## 2024-08-27 RX ORDER — DEXAMETHASONE SODIUM PHOSPHATE 10 MG/ML
INJECTION INTRAMUSCULAR; INTRAVENOUS PRN
Status: DISCONTINUED | OUTPATIENT
Start: 2024-08-27 | End: 2024-08-27 | Stop reason: SDUPTHER

## 2024-08-27 RX ORDER — SODIUM CHLORIDE 0.9 % (FLUSH) 0.9 %
5-40 SYRINGE (ML) INJECTION EVERY 12 HOURS SCHEDULED
Status: DISCONTINUED | OUTPATIENT
Start: 2024-08-27 | End: 2024-08-27 | Stop reason: HOSPADM

## 2024-08-27 RX ORDER — MIDAZOLAM HYDROCHLORIDE 1 MG/ML
INJECTION INTRAMUSCULAR; INTRAVENOUS PRN
Status: DISCONTINUED | OUTPATIENT
Start: 2024-08-27 | End: 2024-08-27 | Stop reason: SDUPTHER

## 2024-08-27 RX ORDER — FENTANYL CITRATE 50 UG/ML
INJECTION, SOLUTION INTRAMUSCULAR; INTRAVENOUS PRN
Status: DISCONTINUED | OUTPATIENT
Start: 2024-08-27 | End: 2024-08-27 | Stop reason: SDUPTHER

## 2024-08-27 RX ORDER — SODIUM CHLORIDE 0.9 % (FLUSH) 0.9 %
5-40 SYRINGE (ML) INJECTION PRN
Status: DISCONTINUED | OUTPATIENT
Start: 2024-08-27 | End: 2024-08-27 | Stop reason: HOSPADM

## 2024-08-27 RX ORDER — MEPERIDINE HYDROCHLORIDE 25 MG/ML
12.5 INJECTION INTRAMUSCULAR; INTRAVENOUS; SUBCUTANEOUS
Status: DISCONTINUED | OUTPATIENT
Start: 2024-08-27 | End: 2024-08-27 | Stop reason: HOSPADM

## 2024-08-27 RX ORDER — ONDANSETRON 2 MG/ML
INJECTION INTRAMUSCULAR; INTRAVENOUS PRN
Status: DISCONTINUED | OUTPATIENT
Start: 2024-08-27 | End: 2024-08-27 | Stop reason: SDUPTHER

## 2024-08-27 RX ORDER — NALOXONE HYDROCHLORIDE 0.4 MG/ML
INJECTION, SOLUTION INTRAMUSCULAR; INTRAVENOUS; SUBCUTANEOUS PRN
Status: DISCONTINUED | OUTPATIENT
Start: 2024-08-27 | End: 2024-08-27 | Stop reason: HOSPADM

## 2024-08-27 RX ORDER — LIDOCAINE 4 G/G
1 PATCH TOPICAL DAILY
Status: DISCONTINUED | OUTPATIENT
Start: 2024-08-27 | End: 2024-09-03 | Stop reason: HOSPADM

## 2024-08-27 RX ORDER — ONDANSETRON 2 MG/ML
4 INJECTION INTRAMUSCULAR; INTRAVENOUS
Status: DISCONTINUED | OUTPATIENT
Start: 2024-08-27 | End: 2024-08-27 | Stop reason: HOSPADM

## 2024-08-27 RX ORDER — PROPOFOL 10 MG/ML
INJECTION, EMULSION INTRAVENOUS PRN
Status: DISCONTINUED | OUTPATIENT
Start: 2024-08-27 | End: 2024-08-27 | Stop reason: SDUPTHER

## 2024-08-27 RX ORDER — SODIUM CHLORIDE 9 MG/ML
INJECTION, SOLUTION INTRAVENOUS CONTINUOUS PRN
Status: DISCONTINUED | OUTPATIENT
Start: 2024-08-27 | End: 2024-08-27 | Stop reason: SDUPTHER

## 2024-08-27 RX ADMIN — OXYCODONE HYDROCHLORIDE 10 MG: 10 TABLET ORAL at 22:03

## 2024-08-27 RX ADMIN — METHOCARBAMOL TABLETS 1000 MG: 500 TABLET, COATED ORAL at 21:22

## 2024-08-27 RX ADMIN — PROPOFOL 150 MG: 10 INJECTION, EMULSION INTRAVENOUS at 10:38

## 2024-08-27 RX ADMIN — MIDAZOLAM 2 MG: 1 INJECTION INTRAMUSCULAR; INTRAVENOUS at 10:27

## 2024-08-27 RX ADMIN — METHOCARBAMOL TABLETS 1000 MG: 500 TABLET, COATED ORAL at 16:45

## 2024-08-27 RX ADMIN — LIDOCAINE HYDROCHLORIDE 100 MG: 20 INJECTION, SOLUTION INTRAVENOUS at 10:38

## 2024-08-27 RX ADMIN — DEXAMETHASONE SODIUM PHOSPHATE 10 MG: 10 INJECTION INTRAMUSCULAR; INTRAVENOUS at 10:38

## 2024-08-27 RX ADMIN — SODIUM CHLORIDE, PRESERVATIVE FREE 10 ML: 5 INJECTION INTRAVENOUS at 08:02

## 2024-08-27 RX ADMIN — OXYCODONE HYDROCHLORIDE 15 MG: 15 TABLET ORAL at 16:46

## 2024-08-27 RX ADMIN — MORPHINE SULFATE 4 MG: 4 INJECTION, SOLUTION INTRAMUSCULAR; INTRAVENOUS at 21:11

## 2024-08-27 RX ADMIN — METHOCARBAMOL TABLETS 1000 MG: 500 TABLET, COATED ORAL at 08:01

## 2024-08-27 RX ADMIN — ARFORMOTEROL TARTRATE: 15 SOLUTION RESPIRATORY (INHALATION) at 08:48

## 2024-08-27 RX ADMIN — MORPHINE SULFATE 4 MG: 4 INJECTION, SOLUTION INTRAMUSCULAR; INTRAVENOUS at 00:13

## 2024-08-27 RX ADMIN — SODIUM CHLORIDE: 9 INJECTION, SOLUTION INTRAVENOUS at 00:06

## 2024-08-27 RX ADMIN — FENTANYL CITRATE 200 MCG: 0.05 INJECTION, SOLUTION INTRAMUSCULAR; INTRAVENOUS at 10:38

## 2024-08-27 RX ADMIN — MORPHINE SULFATE 4 MG: 4 INJECTION, SOLUTION INTRAMUSCULAR; INTRAVENOUS at 18:42

## 2024-08-27 RX ADMIN — FENTANYL CITRATE 50 MCG: 0.05 INJECTION, SOLUTION INTRAMUSCULAR; INTRAVENOUS at 10:58

## 2024-08-27 RX ADMIN — OXYCODONE HYDROCHLORIDE 10 MG: 10 TABLET ORAL at 04:30

## 2024-08-27 RX ADMIN — MORPHINE SULFATE 4 MG: 4 INJECTION, SOLUTION INTRAMUSCULAR; INTRAVENOUS at 02:01

## 2024-08-27 RX ADMIN — ENOXAPARIN SODIUM 40 MG: 100 INJECTION SUBCUTANEOUS at 08:01

## 2024-08-27 RX ADMIN — AMLODIPINE BESYLATE 5 MG: 5 TABLET ORAL at 08:01

## 2024-08-27 RX ADMIN — GABAPENTIN 300 MG: 300 CAPSULE ORAL at 08:01

## 2024-08-27 RX ADMIN — SODIUM CHLORIDE, PRESERVATIVE FREE 10 ML: 5 INJECTION INTRAVENOUS at 21:23

## 2024-08-27 RX ADMIN — GABAPENTIN 300 MG: 300 CAPSULE ORAL at 21:24

## 2024-08-27 RX ADMIN — SODIUM CHLORIDE 3000 MG: 9 INJECTION, SOLUTION INTRAVENOUS at 22:00

## 2024-08-27 RX ADMIN — ONDANSETRON HYDROCHLORIDE 4 MG: 2 SOLUTION INTRAMUSCULAR; INTRAVENOUS at 10:38

## 2024-08-27 RX ADMIN — ACETAMINOPHEN 650 MG: 325 TABLET ORAL at 04:30

## 2024-08-27 RX ADMIN — CEFAZOLIN 3 G: 1 INJECTION, POWDER, FOR SOLUTION INTRAMUSCULAR; INTRAVENOUS at 10:45

## 2024-08-27 RX ADMIN — ROCURONIUM BROMIDE 50 MG: 10 INJECTION, SOLUTION INTRAVENOUS at 10:38

## 2024-08-27 RX ADMIN — SODIUM CHLORIDE: 9 INJECTION, SOLUTION INTRAVENOUS at 10:27

## 2024-08-27 RX ADMIN — ENOXAPARIN SODIUM 40 MG: 100 INJECTION SUBCUTANEOUS at 21:17

## 2024-08-27 RX ADMIN — TAMSULOSIN HYDROCHLORIDE 0.4 MG: 0.4 CAPSULE ORAL at 21:23

## 2024-08-27 ASSESSMENT — PAIN SCALES - GENERAL
PAINLEVEL_OUTOF10: 0
PAINLEVEL_OUTOF10: 10
PAINLEVEL_OUTOF10: 10
PAINLEVEL_OUTOF10: 6
PAINLEVEL_OUTOF10: 8
PAINLEVEL_OUTOF10: 8
PAINLEVEL_OUTOF10: 6
PAINLEVEL_OUTOF10: 10
PAINLEVEL_OUTOF10: 2
PAINLEVEL_OUTOF10: 9
PAINLEVEL_OUTOF10: 0
PAINLEVEL_OUTOF10: 6
PAINLEVEL_OUTOF10: 7

## 2024-08-27 ASSESSMENT — PAIN DESCRIPTION - ORIENTATION
ORIENTATION: RIGHT;LEFT;INNER
ORIENTATION: MID
ORIENTATION: RIGHT;LEFT;MID;DISTAL
ORIENTATION: RIGHT;LEFT

## 2024-08-27 ASSESSMENT — PAIN DESCRIPTION - DESCRIPTORS
DESCRIPTORS: ACHING;THROBBING;TINGLING
DESCRIPTORS: ACHING;THROBBING;TEARING
DESCRIPTORS: ACHING;GNAWING;THROBBING
DESCRIPTORS: DISCOMFORT;CRAMPING;ACHING

## 2024-08-27 ASSESSMENT — PAIN DESCRIPTION - LOCATION
LOCATION: ARM;WRIST
LOCATION: BACK

## 2024-08-27 ASSESSMENT — PAIN - FUNCTIONAL ASSESSMENT
PAIN_FUNCTIONAL_ASSESSMENT: PREVENTS OR INTERFERES SOME ACTIVE ACTIVITIES AND ADLS
PAIN_FUNCTIONAL_ASSESSMENT: 0-10

## 2024-08-27 ASSESSMENT — LIFESTYLE VARIABLES: SMOKING_STATUS: 1

## 2024-08-27 NOTE — TELEPHONE ENCOUNTER
Patient's wife called.  States she was not available when you came out of OR this morning.  She is asking for return phone call to discuss surgery done today and plans for future surgery this week.

## 2024-08-27 NOTE — ANESTHESIA PRE PROCEDURE
BUN 11 08/27/2024 05:55 AM    CREATININE 0.6 08/27/2024 05:55 AM    LABGLOM >90 08/27/2024 05:55 AM    GLUCOSE 130 08/27/2024 05:55 AM    CALCIUM 8.4 08/27/2024 05:55 AM    BILITOT 5.4 08/27/2024 05:55 AM    ALKPHOS 89 08/27/2024 05:55 AM     08/27/2024 05:55 AM     08/27/2024 05:55 AM       POC Tests: No results for input(s): \"POCGLU\", \"POCNA\", \"POCK\", \"POCCL\", \"POCBUN\", \"POCHEMO\", \"POCHCT\" in the last 72 hours.    Coags:   Lab Results   Component Value Date/Time    PROTIME 10.4 08/23/2024 09:05 PM    INR 1.0 08/23/2024 09:05 PM    APTT 21.6 08/23/2024 09:05 PM       HCG (If Applicable): No results found for: \"PREGTESTUR\", \"PREGSERUM\", \"HCG\", \"HCGQUANT\"     ABGs: No results found for: \"PHART\", \"PO2ART\", \"OUO8KAK\", \"HIV7IWO\", \"BEART\", \"L9OYVONF\"     Type & Screen (If Applicable):  No results found for: \"LABABO\"    Drug/Infectious Status (If Applicable):  No results found for: \"HIV\", \"HEPCAB\"    COVID-19 Screening (If Applicable): No results found for: \"COVID19\"        Anesthesia Evaluation  Patient summary reviewed and Nursing notes reviewed   no history of anesthetic complications:   Airway: Mallampati: II  TM distance: >3 FB   Neck ROM: full  Mouth opening: > = 3 FB   Dental:    (+) poor dentition      Pulmonary: breath sounds clear to auscultation  (+)           current smoker          Patient did not smoke on day of surgery.                 Cardiovascular:  Exercise tolerance: good (>4 METS)          Rhythm: regular  Rate: normal                    Neuro/Psych:   Negative Neuro/Psych ROS              GI/Hepatic/Renal:   (+) morbid obesity          Endo/Other: Negative Endo/Other ROS                    Abdominal:   (+) obese          Vascular: negative vascular ROS.         Other Findings:             Anesthesia Plan      general     ASA 2       Induction: intravenous.    MIPS: Postoperative opioids intended and Prophylactic antiemetics administered.  Anesthetic plan and risks discussed with  patient.    Use of blood products discussed with patient whom consented to blood products.    Plan discussed with attending.                    LYNN Choi - CRNA   8/27/2024

## 2024-08-27 NOTE — ANESTHESIA POSTPROCEDURE EVALUATION
Department of Anesthesiology  Postprocedure Note    Patient: Cheikh Freedman  MRN: [id number]  YOB: 1990  Date of evaluation: 8/27/2024    Procedure Summary       Date: 08/27/24 Room / Location: [address]    Anesthesia Start: 1026 Anesthesia Stop: 1134    Procedure: Bilateral Distal Radius Incision and Drainage (Bilateral: Wrist) Diagnosis:       Closed fracture of olecranon process of right ulna, initial encounter      Closed fracture of distal end of right radius, unspecified fracture morphology, initial encounter      Closed fracture of distal end of left radius, unspecified fracture morphology, initial encounter      (Closed fracture of olecranon process of right ulna, initial encounter [S52.021A])      (Closed fracture of distal end of right radius, unspecified fracture morphology, initial encounter [S52.501A])      (Closed fracture of distal end of left radius, unspecified fracture morphology, initial encounter [S52.502A])    Surgeons: Giacomo Norman DO Responsible Provider: Zach Garcia DO    Anesthesia Type: general ASA Status: 2            Anesthesia Type: No value filed.    Ada Phase I: Ada Score: 8    Ada Phase II:      Anesthesia Post Evaluation    Patient location during evaluation: PACU  Patient participation: complete - patient participated  Level of consciousness: awake and alert  Airway patency: patent  Nausea & Vomiting: no nausea and no vomiting  Cardiovascular status: blood pressure returned to baseline  Respiratory status: acceptable  Hydration status: euvolemic  Multimodal analgesia pain management approach  Pain management: adequate    No notable events documented.

## 2024-08-27 NOTE — PROGRESS NOTES
Physical Therapy    Medical chart reviewed for PT treatment on 8/27/24. Pt off unit in OR for removal of ex fix at time of attempt. Will follow up as able. Thank you.    Guerline Ramon, PT, DPT  [id number]

## 2024-08-27 NOTE — CARE COORDINATION
Chart reviewed and case reviewed in IDR.  Patient admitted s/p fall out of a tree open fractures to bilateral radius and Olecranon fracture.  Referral made to PM&R yesterday for transition of care planning.  Dr Felix saw, await evaluations post-op for transition of care planning, ARU vs Home with [facility].  Patient to OR today with orthopedic surgery for I&D to make sure there is no gross contamination.  Per Dr Norman: Today the plan is repeat irrigation and debridement of both of his open wrist fractures, Definitive fixation will take place on Thursday with a hand and upper extremity specialist of all 3 fractures.  Patient medically stable to be Med/Surg, order received yesterday.  Await therapy evaluations post-op for further transition of care planning needs.       Addie Millard RN.  P:  [phone]

## 2024-08-27 NOTE — PROGRESS NOTES
Occupational Therapy     Date:2024  Patient Name: Cheikh Freedman  MRN: [id number]  : 1990  Room: [address] [place] [address]     Completed chart review & noted pt is scheduled for surgery this date to remove ext fix. Will attempt to see pt at another time.    MANDEEP Shultz/L [id number]

## 2024-08-27 NOTE — PROGRESS NOTES
Pt verified using 2 Identifiers and ID band with OR staff prior to acceptance to PACU/Phase II care.    cont statin

## 2024-08-27 NOTE — PROGRESS NOTES
Patient arrived to OR suite with eye glasses. Glasses removed and will return with patient to post-op recovery and nurse will be notified at that time.     Electronically signed by Martha Coronel RN on 8/27/24 at 11:07 AM EDT

## 2024-08-27 NOTE — BRIEF OP NOTE
Brief Postoperative Note      Patient: Cheikh Freedman  YOB: 1990  MRN: [id number]    Date of Procedure: 8/27/2024    Pre-Op Diagnosis Codes:   Grade 3A open left distal radius fracture  Grade 2 open right distal radius fracture  Closed right olecranon fracture    Post-Op Diagnosis: Same       Procedure(s):  Irrigation debridement including bone of open left distal radius fracture  Irrigation debridement including bone of open right distal radius fracture  Closed reduction and splint application right olecranon and right distal radius fracture under anesthesia with fluoroscopy    Surgeon(s):  Giacomo Norman DO    Assistant:  Resident: Galo Coronel DO    Anesthesia: General    Estimated Blood Loss (mL): Minimal    Complications: None    Specimens:   * No specimens in log *    Implants:  * No implants in log *      Drains:   Urinary Catheter 08/24/24 (Active)   $ Urethral catheter insertion $ Not inserted for procedure 08/25/24 0241   Catheter Indications Urinary retention (acute or chronic), continuous bladder irrigation or bladder outlet obstruction 08/27/24 0800   Site Assessment No urethral drainage 08/27/24 0800   Urine Color Yellow 08/26/24 2349   Urine Appearance Clear 08/26/24 2349   Collection Container Standard 08/26/24 2349   Securement Method Securing device (Describe) 08/26/24 2349   Catheter Care  Soap and water 08/25/24 2141   Catheter Best Practices  Drainage tube clipped to bed;Catheter secured to thigh;Tamper seal intact;Bag below bladder;Bag not on floor;Lack of dependent loop in tubing;Drainage bag less than half full 08/26/24 2349   Status Draining 08/26/24 2349   Output (mL) 1300 mL 08/26/24 0127       Findings:  Infection Present At Time Of Surgery (PATOS) (choose all levels that have infection present):  No infection present  Other Findings: Repeat irrigation debridement of bilateral open distal radius fractures, closed reduction and splint application of right distal radius and
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
## 2024-08-27 NOTE — PROGRESS NOTES
TRAUMA SURGERY  DAILY PROGRESS NOTE    Patient's Name/Date of Birth: Cheikh Freedman / 1990    Date: 2024     Chief Complaint   Patient presents with    Trauma        Subjective:  No acute events overnight, going to OR today for takeback. Pain in ribs is worse.       Objective:  Last 24Hrs  Temp  Av °F (36.7 °C)  Min: 98 °F (36.7 °C)  Max: 98.1 °F (36.7 °C)  Resp  Av.4  Min: 18  Max: 20  Pulse  Av.4  Min: 89  Max: 110  Systolic (24hrs), Av , Min:130 , Max:188     Diastolic (24hrs), Av, Min:71, Max:121    SpO2  Av.8 %  Min: 94 %  Max: 97 %    I/O last 3 completed shifts:  In: -   Out: 3100 [Urine:3100]      General: resting in chair  Cardiovascular: Warm throughout, no edema  Respiratory: no respiratory distress, equal chest rise. SMI 2500  Abdomen: soft,  nontender, nondistended  Skin: no obvious rashes or lesions appreciated, no jaundice  Extremities: bilateral upper extremities in splint, ex fix on LUE, motor and sensation intact, moving bilateral LE      CBC  Recent Labs     24  0540 24  0535   WBC 18.1* 14.2*   RBC 3.93 3.74*   HGB 12.5 11.8*   HCT 36.4* 34.8*   MCV 92.6 93.0   MCH 31.8 31.6   MCHC 34.3 33.9   RDW 12.5 12.4    192   MPV 10.5 10.7       CMP  Recent Labs     24  0540 24  0535    136   K 4.5 4.4   CL 99 100   CO2 25 27   BUN 20 15   CREATININE 0.9 0.8   GLUCOSE 143* 139*   CALCIUM 8.4* 8.7   BILITOT 0.9 0.7   ALKPHOS 73 79   * 160*   * 540*         Assessment/Plan:    Patient Active Problem List   Diagnosis    Fall from tree, initial encounter    Poor venous access    Open fracture of distal end of radius    Closed fracture of right olecranon process    Closed fracture of multiple ribs of both sides    Closed fracture of right distal radius    Closed fracture of multiple ribs       34 y.o. male s/p fall from tree with L 6 rib fx, R 6-7 rib, open R distal radius fx, closed olecranon fx, open L distal radius
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
## 2024-08-28 LAB
ALBUMIN SERPL-MCNC: 3.3 G/DL (ref 3.5–5.2)
ALP SERPL-CCNC: 112 U/L (ref 40–129)
ALT SERPL-CCNC: 260 U/L (ref 0–40)
ANION GAP SERPL CALCULATED.3IONS-SCNC: 11 MMOL/L (ref 7–16)
AST SERPL-CCNC: 57 U/L (ref 0–39)
BASOPHILS # BLD: 0.02 K/UL (ref 0–0.2)
BASOPHILS NFR BLD: 0 % (ref 0–2)
BILIRUB DIRECT SERPL-MCNC: 0.9 MG/DL (ref 0–0.3)
BILIRUB INDIRECT SERPL-MCNC: 0.7 MG/DL (ref 0–1)
BILIRUB SERPL-MCNC: 1.6 MG/DL (ref 0–1.2)
BILIRUB SERPL-MCNC: 1.6 MG/DL (ref 0–1.2)
BUN SERPL-MCNC: 14 MG/DL (ref 6–20)
CALCIUM SERPL-MCNC: 8.6 MG/DL (ref 8.6–10.2)
CHLORIDE SERPL-SCNC: 100 MMOL/L (ref 98–107)
CO2 SERPL-SCNC: 27 MMOL/L (ref 22–29)
CREAT SERPL-MCNC: 0.6 MG/DL (ref 0.7–1.2)
EOSINOPHIL # BLD: 0 K/UL (ref 0.05–0.5)
EOSINOPHILS RELATIVE PERCENT: 0 % (ref 0–6)
ERYTHROCYTE [DISTWIDTH] IN BLOOD BY AUTOMATED COUNT: 12.6 % (ref 11.5–15)
GFR, ESTIMATED: >90 ML/MIN/1.73M2
GLUCOSE SERPL-MCNC: 142 MG/DL (ref 74–99)
HCT VFR BLD AUTO: 30 % (ref 37–54)
HGB BLD-MCNC: 10.4 G/DL (ref 12.5–16.5)
IMM GRANULOCYTES # BLD AUTO: 0.14 K/UL (ref 0–0.58)
IMM GRANULOCYTES NFR BLD: 1 % (ref 0–5)
LYMPHOCYTES NFR BLD: 0.99 K/UL (ref 1.5–4)
LYMPHOCYTES RELATIVE PERCENT: 9 % (ref 20–42)
MCH RBC QN AUTO: 32 PG (ref 26–35)
MCHC RBC AUTO-ENTMCNC: 34.7 G/DL (ref 32–34.5)
MCV RBC AUTO: 92.3 FL (ref 80–99.9)
MONOCYTES NFR BLD: 0.82 K/UL (ref 0.1–0.95)
MONOCYTES NFR BLD: 7 % (ref 2–12)
NEUTROPHILS NFR BLD: 83 % (ref 43–80)
NEUTS SEG NFR BLD: 9.73 K/UL (ref 1.8–7.3)
PLATELET # BLD AUTO: 199 K/UL (ref 130–450)
PMV BLD AUTO: 10.1 FL (ref 7–12)
POTASSIUM SERPL-SCNC: 4.1 MMOL/L (ref 3.5–5)
PROT SERPL-MCNC: 6.2 G/DL (ref 6.4–8.3)
RBC # BLD AUTO: 3.25 M/UL (ref 3.8–5.8)
SODIUM SERPL-SCNC: 138 MMOL/L (ref 132–146)
WBC OTHER # BLD: 11.7 K/UL (ref 4.5–11.5)

## 2024-08-28 PROCEDURE — 85025 COMPLETE CBC W/AUTO DIFF WBC: CPT

## 2024-08-28 PROCEDURE — 2580000003 HC RX 258

## 2024-08-28 PROCEDURE — 6370000000 HC RX 637 (ALT 250 FOR IP)

## 2024-08-28 PROCEDURE — 2580000003 HC RX 258: Performed by: STUDENT IN AN ORGANIZED HEALTH CARE EDUCATION/TRAINING PROGRAM

## 2024-08-28 PROCEDURE — 6370000000 HC RX 637 (ALT 250 FOR IP): Performed by: STUDENT IN AN ORGANIZED HEALTH CARE EDUCATION/TRAINING PROGRAM

## 2024-08-28 PROCEDURE — 94640 AIRWAY INHALATION TREATMENT: CPT

## 2024-08-28 PROCEDURE — 99232 SBSQ HOSP IP/OBS MODERATE 35: CPT | Performed by: SURGERY

## 2024-08-28 PROCEDURE — 82248 BILIRUBIN DIRECT: CPT

## 2024-08-28 PROCEDURE — 6360000002 HC RX W HCPCS

## 2024-08-28 PROCEDURE — 1200000000 HC SEMI PRIVATE

## 2024-08-28 PROCEDURE — 80053 COMPREHEN METABOLIC PANEL: CPT

## 2024-08-28 PROCEDURE — 94669 MECHANICAL CHEST WALL OSCILL: CPT

## 2024-08-28 PROCEDURE — 6360000002 HC RX W HCPCS: Performed by: STUDENT IN AN ORGANIZED HEALTH CARE EDUCATION/TRAINING PROGRAM

## 2024-08-28 RX ORDER — HYDRALAZINE HYDROCHLORIDE 20 MG/ML
10 INJECTION INTRAMUSCULAR; INTRAVENOUS EVERY 30 MIN PRN
Status: DISCONTINUED | OUTPATIENT
Start: 2024-08-28 | End: 2024-09-03 | Stop reason: HOSPADM

## 2024-08-28 RX ORDER — LABETALOL HYDROCHLORIDE 5 MG/ML
10 INJECTION, SOLUTION INTRAVENOUS EVERY 30 MIN PRN
Status: DISCONTINUED | OUTPATIENT
Start: 2024-08-28 | End: 2024-09-03 | Stop reason: HOSPADM

## 2024-08-28 RX ORDER — METOPROLOL SUCCINATE 25 MG/1
25 TABLET, EXTENDED RELEASE ORAL DAILY
Status: DISCONTINUED | OUTPATIENT
Start: 2024-08-28 | End: 2024-09-03 | Stop reason: HOSPADM

## 2024-08-28 RX ADMIN — MORPHINE SULFATE 4 MG: 4 INJECTION, SOLUTION INTRAMUSCULAR; INTRAVENOUS at 07:50

## 2024-08-28 RX ADMIN — MORPHINE SULFATE 4 MG: 4 INJECTION, SOLUTION INTRAMUSCULAR; INTRAVENOUS at 13:32

## 2024-08-28 RX ADMIN — GABAPENTIN 300 MG: 300 CAPSULE ORAL at 13:29

## 2024-08-28 RX ADMIN — MORPHINE SULFATE 4 MG: 4 INJECTION, SOLUTION INTRAMUSCULAR; INTRAVENOUS at 11:28

## 2024-08-28 RX ADMIN — OXYCODONE HYDROCHLORIDE 10 MG: 10 TABLET ORAL at 02:21

## 2024-08-28 RX ADMIN — METHOCARBAMOL TABLETS 1000 MG: 500 TABLET, COATED ORAL at 13:29

## 2024-08-28 RX ADMIN — METHOCARBAMOL TABLETS 1000 MG: 500 TABLET, COATED ORAL at 19:56

## 2024-08-28 RX ADMIN — OXYCODONE HYDROCHLORIDE 15 MG: 15 TABLET ORAL at 14:44

## 2024-08-28 RX ADMIN — MORPHINE SULFATE 4 MG: 4 INJECTION, SOLUTION INTRAMUSCULAR; INTRAVENOUS at 19:57

## 2024-08-28 RX ADMIN — POLYETHYLENE GLYCOL 3350 17 G: 17 POWDER, FOR SOLUTION ORAL at 07:50

## 2024-08-28 RX ADMIN — OXYCODONE HYDROCHLORIDE 15 MG: 15 TABLET ORAL at 10:28

## 2024-08-28 RX ADMIN — OXYCODONE HYDROCHLORIDE 15 MG: 15 TABLET ORAL at 18:44

## 2024-08-28 RX ADMIN — SODIUM CHLORIDE, PRESERVATIVE FREE 10 ML: 5 INJECTION INTRAVENOUS at 07:50

## 2024-08-28 RX ADMIN — SODIUM CHLORIDE: 9 INJECTION, SOLUTION INTRAVENOUS at 00:28

## 2024-08-28 RX ADMIN — ARFORMOTEROL TARTRATE: 15 SOLUTION RESPIRATORY (INHALATION) at 19:50

## 2024-08-28 RX ADMIN — MORPHINE SULFATE 4 MG: 4 INJECTION, SOLUTION INTRAMUSCULAR; INTRAVENOUS at 22:05

## 2024-08-28 RX ADMIN — GABAPENTIN 300 MG: 300 CAPSULE ORAL at 19:56

## 2024-08-28 RX ADMIN — TAMSULOSIN HYDROCHLORIDE 0.4 MG: 0.4 CAPSULE ORAL at 19:56

## 2024-08-28 RX ADMIN — SODIUM CHLORIDE 3000 MG: 9 INJECTION, SOLUTION INTRAVENOUS at 03:19

## 2024-08-28 RX ADMIN — ENOXAPARIN SODIUM 40 MG: 100 INJECTION SUBCUTANEOUS at 07:50

## 2024-08-28 RX ADMIN — MORPHINE SULFATE 4 MG: 4 INJECTION, SOLUTION INTRAMUSCULAR; INTRAVENOUS at 16:51

## 2024-08-28 RX ADMIN — ENOXAPARIN SODIUM 40 MG: 100 INJECTION SUBCUTANEOUS at 19:56

## 2024-08-28 RX ADMIN — OXYCODONE HYDROCHLORIDE 10 MG: 10 TABLET ORAL at 06:35

## 2024-08-28 RX ADMIN — METHOCARBAMOL TABLETS 1000 MG: 500 TABLET, COATED ORAL at 07:50

## 2024-08-28 RX ADMIN — SODIUM CHLORIDE, PRESERVATIVE FREE 10 ML: 5 INJECTION INTRAVENOUS at 19:56

## 2024-08-28 RX ADMIN — MORPHINE SULFATE 4 MG: 4 INJECTION, SOLUTION INTRAMUSCULAR; INTRAVENOUS at 03:20

## 2024-08-28 RX ADMIN — GABAPENTIN 300 MG: 300 CAPSULE ORAL at 07:50

## 2024-08-28 RX ADMIN — SODIUM CHLORIDE, PRESERVATIVE FREE 10 ML: 5 INJECTION INTRAVENOUS at 22:06

## 2024-08-28 RX ADMIN — METHOCARBAMOL TABLETS 1000 MG: 500 TABLET, COATED ORAL at 16:49

## 2024-08-28 RX ADMIN — METOPROLOL SUCCINATE 25 MG: 25 TABLET, EXTENDED RELEASE ORAL at 10:34

## 2024-08-28 RX ADMIN — ARFORMOTEROL TARTRATE: 15 SOLUTION RESPIRATORY (INHALATION) at 10:09

## 2024-08-28 RX ADMIN — AMLODIPINE BESYLATE 5 MG: 5 TABLET ORAL at 07:50

## 2024-08-28 RX ADMIN — OXYCODONE HYDROCHLORIDE 15 MG: 15 TABLET ORAL at 23:37

## 2024-08-28 RX ADMIN — MORPHINE SULFATE 4 MG: 4 INJECTION, SOLUTION INTRAMUSCULAR; INTRAVENOUS at 00:24

## 2024-08-28 ASSESSMENT — PAIN SCALES - GENERAL
PAINLEVEL_OUTOF10: 8
PAINLEVEL_OUTOF10: 10
PAINLEVEL_OUTOF10: 9
PAINLEVEL_OUTOF10: 9
PAINLEVEL_OUTOF10: 8
PAINLEVEL_OUTOF10: 10
PAINLEVEL_OUTOF10: 8
PAINLEVEL_OUTOF10: 7
PAINLEVEL_OUTOF10: 8
PAINLEVEL_OUTOF10: 10
PAINLEVEL_OUTOF10: 10
PAINLEVEL_OUTOF10: 8
PAINLEVEL_OUTOF10: 8
PAINLEVEL_OUTOF10: 10
PAINLEVEL_OUTOF10: 7
PAINLEVEL_OUTOF10: 7
PAINLEVEL_OUTOF10: 9
PAINLEVEL_OUTOF10: 8
PAINLEVEL_OUTOF10: 10
PAINLEVEL_OUTOF10: 8
PAINLEVEL_OUTOF10: 9
PAINLEVEL_OUTOF10: 10
PAINLEVEL_OUTOF10: 6
PAINLEVEL_OUTOF10: 8
PAINLEVEL_OUTOF10: 8

## 2024-08-28 ASSESSMENT — PAIN DESCRIPTION - LOCATION
LOCATION: ARM
LOCATION: ARM
LOCATION: ARM;HAND
LOCATION: ARM
LOCATION: ARM;PELVIS
LOCATION: ARM
LOCATION: ARM;FINGER (COMMENT WHICH ONE)
LOCATION: ARM;HAND
LOCATION: ARM
LOCATION: ARM;HAND
LOCATION: ARM;PELVIS
LOCATION: ARM;HAND

## 2024-08-28 ASSESSMENT — PAIN DESCRIPTION - DESCRIPTORS
DESCRIPTORS: ACHING;SHOOTING;TENDER
DESCRIPTORS: ACHING;SORE;STABBING
DESCRIPTORS: ACHING;DISCOMFORT;TENDER;SORE
DESCRIPTORS: ACHING;DISCOMFORT;SORE;SHARP;TENDER
DESCRIPTORS: ACHING;TEARING;THROBBING
DESCRIPTORS: ACHING;DISCOMFORT;TENDER;SORE
DESCRIPTORS: ACHING;THROBBING;TEARING
DESCRIPTORS: ACHING;DISCOMFORT;SORE
DESCRIPTORS: ACHING;THROBBING;TEARING
DESCRIPTORS: ACHING;TENDER;DISCOMFORT
DESCRIPTORS: ACHING;THROBBING
DESCRIPTORS: ACHING;SORE

## 2024-08-28 ASSESSMENT — PAIN SCALES - WONG BAKER
WONGBAKER_NUMERICALRESPONSE: NO HURT

## 2024-08-28 ASSESSMENT — PAIN DESCRIPTION - ORIENTATION
ORIENTATION: RIGHT;LEFT
ORIENTATION: RIGHT;LEFT;INNER
ORIENTATION: LEFT;RIGHT
ORIENTATION: RIGHT;LEFT
ORIENTATION: RIGHT;LEFT

## 2024-08-28 ASSESSMENT — PAIN - FUNCTIONAL ASSESSMENT
PAIN_FUNCTIONAL_ASSESSMENT: PREVENTS OR INTERFERES SOME ACTIVE ACTIVITIES AND ADLS

## 2024-08-28 ASSESSMENT — PAIN DESCRIPTION - PAIN TYPE: TYPE: SURGICAL PAIN

## 2024-08-28 ASSESSMENT — PAIN DESCRIPTION - FREQUENCY: FREQUENCY: CONTINUOUS

## 2024-08-28 ASSESSMENT — PAIN DESCRIPTION - ONSET: ONSET: GRADUAL

## 2024-08-28 NOTE — PROGRESS NOTES
Occupational Therapy  Occupational Therapy    Date:2024  Patient Name: Cheikh Freedman  MRN: [id number]  : 1990  Room: [address] [place] [address]     OT orders received and chart reviewed. OT eval on hold at this time pending OR  with ortho for definite fixation.    OT will follow and re-attempt OT treatment as appropriate.     Ana Melendrez, RAGINI, OTR/L [id number]

## 2024-08-28 NOTE — CARE COORDINATION
Care Coordination  The patient was admitted after he fell out of a tree. He has a right olecranon fracture, open right radius fracture, open right ulna fracture. The patient is nwb on bilateral upper extremities. The patient is post op day #1 bilateral distal radius I/d. Plan for surgery on 8/29 for fixation of bilateral distal radius Orif and right olecranon Orif. Pt [facility] 16/24 and he is walking 230 feet min assist and ot eval is pending. Acute rehab is following but most likely plan will be home upon discharge.

## 2024-08-28 NOTE — PROGRESS NOTES
Department of Orthopedic Surgery  Resident Progress Note    Patient seen and examined at bedside patient awake alert oriented x 3 pondering the room patient states the pain to his bilateral upper extremities well-controlled at this time.  Denies any numbness tingling paresthesias trimmed on either upper extremity and right other hand.  Had multiple questions regarding treatment timeline all questions answered to his satisfaction.    VITALS:  BP (!) 163/85   Pulse (!) 104   Temp 97.2 °F (36.2 °C) (Temporal)   Resp 16   Ht 1.88 m (6' 2\")   Wt (!) 145.2 kg (320 lb)   SpO2 98%   BMI 41.09 kg/m²     General: Awake alert in no acute distress    MUSCULOSKELETAL:   left upper extremity:  Dressing C/D/I external fixator pins in place without significant drainage  Compartments soft and compressible  Although limited in range of motion and strength motor function grossly intact to AIN/PIN/ulnar/median and radial nerves   Brisk capillary refill, warm and well-perfused  Subjectively states sensation intact to radial, ulnar, median nerve distributions hand and fingers and equal to contralateral side    Right upper extremity:  Dressing clean dry and intact with sugar-tong splint and posterior slab splint in place that are well-padded.  No significant drainage at this point  Compartment soft compressible  Maintains motor function to AIN/PIN/ulnar/median and radial nerves  Brisk capillary refill, warm and well-perfused  Subjectively states sensation intact to light touch in radial, ulnar, median nerve distributions of the hand and fingers    CBC:   Lab Results   Component Value Date/Time    WBC 11.7 08/27/2024 05:55 AM    HGB 11.1 08/27/2024 05:55 AM    HCT 32.3 08/27/2024 05:55 AM     08/27/2024 05:55 AM     PT/INR:    Lab Results   Component Value Date/Time    PROTIME 10.4 08/23/2024 09:05 PM    INR 1.0 08/23/2024 09:05 PM       ASSESSMENT  S/P left distal radius I&D with external fixator application; right distal  radius I&D with closed reduction and splinting; right olecranon closed reduction and splinting 8/24/24    PLAN      Continue physical therapy and protocol: Nonweightbearing bilateral upper extremities  Postoperative antibiotic protocol  Deep venous thrombosis prophylaxis: Lovenox 40 mg okay to resume today early mobilization, SCD  PT/OT as able  Pain Control: IV and PO, wean as able  Monitor H&H: 11.1 on 8/27 awaiting new labs today  Plan for definitive fixation on 8/29 with Dr. Lucero n.p.o. at midnight  D/C Plan: Bilateral distal radius ORIF and right olecranon ORIF on 8/29/2024 with Dr. Lucero patient has been added on and all labs ordered  D/W attending  Electronically signed by Galo Coronel DO on 8/28/2024 at 6:02 AM         Electronically signed by Galo Coronel DO on 8/28/2024 at 6:00 AM

## 2024-08-28 NOTE — PROGRESS NOTES
TRAUMA SURGERY  DAILY PROGRESS NOTE    Patient's Name/Date of Birth: Cheikh Freedman / 1990    Date: 2024     Chief Complaint   Patient presents with    Trauma        Subjective:  Washout yesterday with Ortho, planning definitive fixation . Continued hypertension. Pain controlled with medication, feels lidocaine patch helping greatly. Tolerating room air, SMI 2500. Elevated bilirubin yesterday.      Objective:  Last 24Hrs  Temp  Av.5 °F (36.4 °C)  Min: 97.1 °F (36.2 °C)  Max: 98.5 °F (36.9 °C)  Resp  Av.5  Min: 10  Max: 18  Pulse  Av.8  Min: 94  Max: 104  Systolic (24hrs), Av , Min:121 , Max:167     Diastolic (24hrs), Av, Min:84, Max:127    SpO2  Av %  Min: 96 %  Max: 100 %    I/O last 3 completed shifts:  In: 1000 [I.V.:1000]  Out: 6255 [Urine:6250; Blood:5]      General: resting in chair  Cardiovascular: Warm throughout, no edema  Respiratory: no respiratory distress, equal chest rise. SMI 2500  Abdomen: soft,  nontender, nondistended  Skin: no obvious rashes or lesions appreciated, no jaundice  Extremities: bilateral upper extremities in splint, ex fix on LUE, motor and sensation intact, moving bilateral LE      CBC  Recent Labs     24  0535 24  0555 24  0600   WBC 14.2* 11.7* 11.7*   RBC 3.74* 3.51* 3.25*   HGB 11.8* 11.1* 10.4*   HCT 34.8* 32.3* 30.0*   MCV 93.0 92.0 92.3   MCH 31.6 31.6 32.0   MCHC 33.9 34.4 34.7*   RDW 12.4 12.5 12.6    175 199   MPV 10.7 10.5 10.1       CMP  Recent Labs     24  0535 24  0555    135   K 4.4 4.4    98   CO2 27 27   BUN 15 11   CREATININE 0.8 0.6*   GLUCOSE 139* 130*   CALCIUM 8.7 8.4*   BILITOT 0.7 5.4*   ALKPHOS 79 89   * 106*   * 376*         Assessment/Plan:    Patient Active Problem List   Diagnosis    Fall from tree, initial encounter    Poor venous access    Open fracture of distal end of radius    Closed fracture of right elbow    Closed fracture of multiple ribs  of both sides    Closed fracture of right distal radius    Closed fracture of multiple ribs       34 y.o. male s/p fall from tree with L 6 rib fx, R 6-7 rib, open R distal radius fx, closed olecranon fx, open L distal radius fx s/p ex fix      - Elevated bilirubin and liver enzymes: awaiting fractionated bilirubin results. Tylenol stopped.   -Diet today, NPO night for 8/29  - OR 8/29 for defentive fixation   - su to be kept in place until more ambulatory per Uro   - encourage deep breathing and incentive spirometry   - manage pain and nausea as needed   - monitor hemodynamics, Norvasc. Will add beta blocker today.    - 16/24 with PT  - PMR said re-evaluate after surgeries with orthopedic surgery are completed   - encourage up and out of bed as able   - DVT ppx: Lovenox  - Continue multimodal pain control      Hillary Zuniga DO  Surgery Resident, PGY-1    Electronically signed on 8/28/2024 at 7:24 AM       Attending Physician Statement:  I have examined the patient, reviewed the record, and discussed the case with the surgical team.  I agree with the assessment and plan with the following additions, corrections, and changes.      No new events  LFTs improving  Planning for RTOR with ortho tomorrow for ORIF left hand    Electronically signed by Kavon De Anda MD on 8/28/24 at 3:40 PM EDT

## 2024-08-28 NOTE — PLAN OF CARE
Problem: Discharge Planning  Goal: Discharge to home or other facility with appropriate resources  Outcome: Progressing  Flowsheets (Taken 8/27/2024 2130)  Discharge to home or other facility with appropriate resources: Identify barriers to discharge with patient and caregiver     Problem: Pain  Goal: Verbalizes/displays adequate comfort level or baseline comfort level  Outcome: Progressing     Problem: Safety - Adult  Goal: Free from fall injury  Outcome: Progressing  Flowsheets (Taken 8/27/2024 2130)  Free From Fall Injury: Instruct family/caregiver on patient safety     Problem: ABCDS Injury Assessment  Goal: Absence of physical injury  Outcome: Progressing  Flowsheets (Taken 8/27/2024 2130)  Absence of Physical Injury: Implement safety measures based on patient assessment     Problem: Skin/Tissue Integrity  Goal: Absence of new skin breakdown  Description: 1.  Monitor for areas of redness and/or skin breakdown  2.  Assess vascular access sites hourly  3.  Every 4-6 hours minimum:  Change oxygen saturation probe site  4.  Every 4-6 hours:  If on nasal continuous positive airway pressure, respiratory therapy assess nares and determine need for appliance change or resting period.  Outcome: Progressing

## 2024-08-28 NOTE — PROGRESS NOTES
4 Eyes Skin Assessment     NAME:  Cheikh Freedman  YOB: 1990  MEDICAL RECORD NUMBER:  [id number]    The patient is being assessed for  Transfer to New Unit    I agree that at least one RN has performed a thorough Head to Toe Skin Assessment on the patient. ALL assessment sites listed below have been assessed.      Areas assessed by both nurses:    Head, Face, Ears, Shoulders, Back, Chest, Arms, Elbows, Hands, Sacrum. Buttock, Coccyx, Ischium, Legs. Feet and Heels, and Under Medical Devices         Does the Patient have a Wound? Yes wound(s) were present on assessment. LDA wound assessment was Initiated and completed by RN       Raj Prevention initiated by RN: Yes  Wound Care Orders initiated by RN: No    Pressure Injury (Stage 3,4, Unstageable, DTI, NWPT, and Complex wounds) if present, place Wound referral order by RN under : No    New Ostomies, if present place, Ostomy referral order under : No     Nurse 1 eSignature: Electronically signed by Nirav Connolly RN on 8/27/24 at 10:36 PM EDT    **SHARE this note so that the co-signing nurse can place an eSignature**    Nurse 2 eSignature: Electronically signed by BAN STOUT RN on 8/27/24 at 10:37 PM EDT

## 2024-08-29 ENCOUNTER — ANESTHESIA EVENT (OUTPATIENT)
Dept: OPERATING ROOM | Age: 34
End: 2024-08-29
Payer: COMMERCIAL

## 2024-08-29 ENCOUNTER — ANESTHESIA (OUTPATIENT)
Dept: OPERATING ROOM | Age: 34
End: 2024-08-29
Payer: COMMERCIAL

## 2024-08-29 LAB
ALBUMIN SERPL-MCNC: 3.2 G/DL (ref 3.5–5.2)
ALP SERPL-CCNC: 105 U/L (ref 40–129)
ALT SERPL-CCNC: 185 U/L (ref 0–40)
ANION GAP SERPL CALCULATED.3IONS-SCNC: 10 MMOL/L (ref 7–16)
AST SERPL-CCNC: 43 U/L (ref 0–39)
BASOPHILS # BLD: 0.05 K/UL (ref 0–0.2)
BASOPHILS NFR BLD: 0 % (ref 0–2)
BILIRUB SERPL-MCNC: 1.2 MG/DL (ref 0–1.2)
BUN SERPL-MCNC: 14 MG/DL (ref 6–20)
CALCIUM SERPL-MCNC: 8.4 MG/DL (ref 8.6–10.2)
CHLORIDE SERPL-SCNC: 102 MMOL/L (ref 98–107)
CO2 SERPL-SCNC: 26 MMOL/L (ref 22–29)
CREAT SERPL-MCNC: 0.6 MG/DL (ref 0.7–1.2)
EOSINOPHIL # BLD: 0.14 K/UL (ref 0.05–0.5)
EOSINOPHILS RELATIVE PERCENT: 1 % (ref 0–6)
ERYTHROCYTE [DISTWIDTH] IN BLOOD BY AUTOMATED COUNT: 13.3 % (ref 11.5–15)
GFR, ESTIMATED: >90 ML/MIN/1.73M2
GLUCOSE SERPL-MCNC: 82 MG/DL (ref 74–99)
HCT VFR BLD AUTO: 30.7 % (ref 37–54)
HGB BLD-MCNC: 10.1 G/DL (ref 12.5–16.5)
IMM GRANULOCYTES # BLD AUTO: 0.3 K/UL (ref 0–0.58)
IMM GRANULOCYTES NFR BLD: 3 % (ref 0–5)
LYMPHOCYTES NFR BLD: 2.49 K/UL (ref 1.5–4)
LYMPHOCYTES RELATIVE PERCENT: 22 % (ref 20–42)
MCH RBC QN AUTO: 31.4 PG (ref 26–35)
MCHC RBC AUTO-ENTMCNC: 32.9 G/DL (ref 32–34.5)
MCV RBC AUTO: 95.3 FL (ref 80–99.9)
MONOCYTES NFR BLD: 1.02 K/UL (ref 0.1–0.95)
MONOCYTES NFR BLD: 9 % (ref 2–12)
NEUTROPHILS NFR BLD: 64 % (ref 43–80)
NEUTS SEG NFR BLD: 7.15 K/UL (ref 1.8–7.3)
PLATELET # BLD AUTO: 235 K/UL (ref 130–450)
PMV BLD AUTO: 10.3 FL (ref 7–12)
POTASSIUM SERPL-SCNC: 4 MMOL/L (ref 3.5–5)
PROT SERPL-MCNC: 5.8 G/DL (ref 6.4–8.3)
RBC # BLD AUTO: 3.22 M/UL (ref 3.8–5.8)
SODIUM SERPL-SCNC: 138 MMOL/L (ref 132–146)
WBC OTHER # BLD: 11.2 K/UL (ref 4.5–11.5)

## 2024-08-29 PROCEDURE — 7100000000 HC PACU RECOVERY - FIRST 15 MIN: Performed by: STUDENT IN AN ORGANIZED HEALTH CARE EDUCATION/TRAINING PROGRAM

## 2024-08-29 PROCEDURE — 2720000010 HC SURG SUPPLY STERILE: Performed by: STUDENT IN AN ORGANIZED HEALTH CARE EDUCATION/TRAINING PROGRAM

## 2024-08-29 PROCEDURE — 94640 AIRWAY INHALATION TREATMENT: CPT

## 2024-08-29 PROCEDURE — 2500000003 HC RX 250 WO HCPCS

## 2024-08-29 PROCEDURE — 6370000000 HC RX 637 (ALT 250 FOR IP)

## 2024-08-29 PROCEDURE — 7100000001 HC PACU RECOVERY - ADDTL 15 MIN: Performed by: STUDENT IN AN ORGANIZED HEALTH CARE EDUCATION/TRAINING PROGRAM

## 2024-08-29 PROCEDURE — 3700000001 HC ADD 15 MINUTES (ANESTHESIA): Performed by: STUDENT IN AN ORGANIZED HEALTH CARE EDUCATION/TRAINING PROGRAM

## 2024-08-29 PROCEDURE — 3700000000 HC ANESTHESIA ATTENDED CARE: Performed by: STUDENT IN AN ORGANIZED HEALTH CARE EDUCATION/TRAINING PROGRAM

## 2024-08-29 PROCEDURE — 6360000002 HC RX W HCPCS

## 2024-08-29 PROCEDURE — 6370000000 HC RX 637 (ALT 250 FOR IP): Performed by: STUDENT IN AN ORGANIZED HEALTH CARE EDUCATION/TRAINING PROGRAM

## 2024-08-29 PROCEDURE — 0PPJX5Z REMOVAL OF EXTERNAL FIXATION DEVICE FROM LEFT RADIUS, EXTERNAL APPROACH: ICD-10-PCS | Performed by: STUDENT IN AN ORGANIZED HEALTH CARE EDUCATION/TRAINING PROGRAM

## 2024-08-29 PROCEDURE — 36415 COLL VENOUS BLD VENIPUNCTURE: CPT

## 2024-08-29 PROCEDURE — C1713 ANCHOR/SCREW BN/BN,TIS/BN: HCPCS | Performed by: STUDENT IN AN ORGANIZED HEALTH CARE EDUCATION/TRAINING PROGRAM

## 2024-08-29 PROCEDURE — 0PSJ04Z REPOSITION LEFT RADIUS WITH INTERNAL FIXATION DEVICE, OPEN APPROACH: ICD-10-PCS | Performed by: STUDENT IN AN ORGANIZED HEALTH CARE EDUCATION/TRAINING PROGRAM

## 2024-08-29 PROCEDURE — 2580000003 HC RX 258

## 2024-08-29 PROCEDURE — C1769 GUIDE WIRE: HCPCS | Performed by: STUDENT IN AN ORGANIZED HEALTH CARE EDUCATION/TRAINING PROGRAM

## 2024-08-29 PROCEDURE — 0PSH04Z REPOSITION RIGHT RADIUS WITH INTERNAL FIXATION DEVICE, OPEN APPROACH: ICD-10-PCS | Performed by: STUDENT IN AN ORGANIZED HEALTH CARE EDUCATION/TRAINING PROGRAM

## 2024-08-29 PROCEDURE — 99232 SBSQ HOSP IP/OBS MODERATE 35: CPT | Performed by: SURGERY

## 2024-08-29 PROCEDURE — 3600000006 HC SURGERY LEVEL 6 BASE: Performed by: STUDENT IN AN ORGANIZED HEALTH CARE EDUCATION/TRAINING PROGRAM

## 2024-08-29 PROCEDURE — 1200000000 HC SEMI PRIVATE

## 2024-08-29 PROCEDURE — 2709999900 HC NON-CHARGEABLE SUPPLY: Performed by: STUDENT IN AN ORGANIZED HEALTH CARE EDUCATION/TRAINING PROGRAM

## 2024-08-29 PROCEDURE — 85025 COMPLETE CBC W/AUTO DIFF WBC: CPT

## 2024-08-29 PROCEDURE — 3600000016 HC SURGERY LEVEL 6 ADDTL 15MIN: Performed by: STUDENT IN AN ORGANIZED HEALTH CARE EDUCATION/TRAINING PROGRAM

## 2024-08-29 PROCEDURE — 80053 COMPREHEN METABOLIC PANEL: CPT

## 2024-08-29 PROCEDURE — 0PSK04Z REPOSITION RIGHT ULNA WITH INTERNAL FIXATION DEVICE, OPEN APPROACH: ICD-10-PCS | Performed by: STUDENT IN AN ORGANIZED HEALTH CARE EDUCATION/TRAINING PROGRAM

## 2024-08-29 DEVICE — IMPLANTABLE DEVICE: Type: IMPLANTABLE DEVICE | Site: RADIUS | Status: FUNCTIONAL

## 2024-08-29 DEVICE — SCREW BNE ST 2X8 MM LCK W/ STARDRV RECESS SS NS LCP: Type: IMPLANTABLE DEVICE | Site: OLECRANON | Status: FUNCTIONAL

## 2024-08-29 DEVICE — SCREW BNE L26MM DIA3.5MM CORT S STL ST NONCANNULATED LOK: Type: IMPLANTABLE DEVICE | Site: OLECRANON | Status: FUNCTIONAL

## 2024-08-29 DEVICE — SCREW BNE L20MM DIA3.5MM CORT TI ST NONLOCKING HD LO PROF: Type: IMPLANTABLE DEVICE | Site: RADIUS | Status: FUNCTIONAL

## 2024-08-29 DEVICE — VAL KREULOCK SCREW TI 2.4X22: Type: IMPLANTABLE DEVICE | Site: RADIUS | Status: FUNCTIONAL

## 2024-08-29 DEVICE — SCREW BNE L20MM DIA2MM CORT S STL ST LOK FULL THRD T6: Type: IMPLANTABLE DEVICE | Site: OLECRANON | Status: FUNCTIONAL

## 2024-08-29 DEVICE — SCREW TI VAL KREULOCK   2.4X12: Type: IMPLANTABLE DEVICE | Site: RADIUS | Status: FUNCTIONAL

## 2024-08-29 DEVICE — PLATE BNE L90MM 2 H ST R OLECRANON S STL LO PROF VAR ANG: Type: IMPLANTABLE DEVICE | Site: OLECRANON | Status: FUNCTIONAL

## 2024-08-29 DEVICE — SCREW BNE L32MM DIA2.7MM S STL ST VAR ANG LOK FULL THRD T8: Type: IMPLANTABLE DEVICE | Site: OLECRANON | Status: FUNCTIONAL

## 2024-08-29 DEVICE — SCREW BNE L36MM DIA2.7MM ANK S STL ST VAR ANG LOK FULL THRD: Type: IMPLANTABLE DEVICE | Site: OLECRANON | Status: FUNCTIONAL

## 2024-08-29 DEVICE — SCREW BNE L22MM DIA2.7MM ANK S STL ST VAR ANG LOK FULL THRD: Type: IMPLANTABLE DEVICE | Site: OLECRANON | Status: FUNCTIONAL

## 2024-08-29 DEVICE — SCREW TI VAL KREULOCK 2.4X 14: Type: IMPLANTABLE DEVICE | Site: RADIUS | Status: FUNCTIONAL

## 2024-08-29 DEVICE — SCREW BNE L24MM DIA2.7MM ANK S STL ST VAR ANG LOK FULL THRD: Type: IMPLANTABLE DEVICE | Site: OLECRANON | Status: FUNCTIONAL

## 2024-08-29 DEVICE — SCREW BNE L22MM DIA2MM CORT S STL ST LOK FULL THRD T6: Type: IMPLANTABLE DEVICE | Site: OLECRANON | Status: FUNCTIONAL

## 2024-08-29 DEVICE — SCREW BNE L28MM DIA2.7MM S STL ST VAR ANG LOK FULL THRD T8: Type: IMPLANTABLE DEVICE | Site: OLECRANON | Status: FUNCTIONAL

## 2024-08-29 DEVICE — IMPLANTABLE DEVICE: Type: IMPLANTABLE DEVICE | Site: OLECRANON | Status: FUNCTIONAL

## 2024-08-29 DEVICE — SCREW TI VAL KREULOCK   2.4X 16: Type: IMPLANTABLE DEVICE | Site: RADIUS | Status: FUNCTIONAL

## 2024-08-29 DEVICE — SCREW BNE L32MM DIA3.5MM CORT S STL ST NONCANNULATED LOK: Type: IMPLANTABLE DEVICE | Site: OLECRANON | Status: FUNCTIONAL

## 2024-08-29 DEVICE — PLATE BNE L53MM 3X7 H S STL T LOK COMPR FOR 2MM SCR MOD: Type: IMPLANTABLE DEVICE | Site: OLECRANON | Status: FUNCTIONAL

## 2024-08-29 DEVICE — GRAFT BNE SUB 15ML 1.7-10MM CANC CHIP MORSELIZED FRZ DRY: Type: IMPLANTABLE DEVICE | Site: RADIUS | Status: FUNCTIONAL

## 2024-08-29 DEVICE — SCREW BNE L20MM DIA2.7MM ANK S STL ST VAR ANG LOK FULL THRD: Type: IMPLANTABLE DEVICE | Site: OLECRANON | Status: FUNCTIONAL

## 2024-08-29 DEVICE — PLATE BNE STD 3 H L VOLAR DST RAD TI NAR COMPHSVE: Type: IMPLANTABLE DEVICE | Site: RADIUS | Status: FUNCTIONAL

## 2024-08-29 DEVICE — SCREW BNE ST 2X24 MM CORTICAL FT T6 STARDRV RECESS SS NS LCP: Type: IMPLANTABLE DEVICE | Site: OLECRANON | Status: FUNCTIONAL

## 2024-08-29 DEVICE — SCREW BNE L12MM DIA2.4MM CORT LOK LO PROF FOR COMPHSVE FT: Type: IMPLANTABLE DEVICE | Site: RADIUS | Status: FUNCTIONAL

## 2024-08-29 DEVICE — SCREW BNE L18MM DIA3.5MM TI LO PROF FOR ANK FRAC SET: Type: IMPLANTABLE DEVICE | Site: RADIUS | Status: FUNCTIONAL

## 2024-08-29 DEVICE — VAL KREULOCK SCREW TI 2.4X20: Type: IMPLANTABLE DEVICE | Site: RADIUS | Status: FUNCTIONAL

## 2024-08-29 RX ORDER — BACITRACIN ZINC 500 [USP'U]/G
OINTMENT TOPICAL PRN
Status: DISCONTINUED | OUTPATIENT
Start: 2024-08-29 | End: 2024-08-30 | Stop reason: ALTCHOICE

## 2024-08-29 RX ORDER — CEFAZOLIN SODIUM 1 G/3ML
INJECTION, POWDER, FOR SOLUTION INTRAMUSCULAR; INTRAVENOUS PRN
Status: DISCONTINUED | OUTPATIENT
Start: 2024-08-29 | End: 2024-08-30 | Stop reason: SDUPTHER

## 2024-08-29 RX ORDER — MIDAZOLAM HYDROCHLORIDE 1 MG/ML
INJECTION INTRAMUSCULAR; INTRAVENOUS PRN
Status: DISCONTINUED | OUTPATIENT
Start: 2024-08-29 | End: 2024-08-30 | Stop reason: SDUPTHER

## 2024-08-29 RX ORDER — LIDOCAINE HYDROCHLORIDE 20 MG/ML
INJECTION, SOLUTION INTRAVENOUS PRN
Status: DISCONTINUED | OUTPATIENT
Start: 2024-08-29 | End: 2024-08-30 | Stop reason: SDUPTHER

## 2024-08-29 RX ORDER — ROCURONIUM BROMIDE 10 MG/ML
INJECTION, SOLUTION INTRAVENOUS PRN
Status: DISCONTINUED | OUTPATIENT
Start: 2024-08-29 | End: 2024-08-30 | Stop reason: SDUPTHER

## 2024-08-29 RX ORDER — FENTANYL CITRATE 50 UG/ML
INJECTION, SOLUTION INTRAMUSCULAR; INTRAVENOUS PRN
Status: DISCONTINUED | OUTPATIENT
Start: 2024-08-29 | End: 2024-08-30 | Stop reason: SDUPTHER

## 2024-08-29 RX ORDER — DEXAMETHASONE SODIUM PHOSPHATE 10 MG/ML
INJECTION INTRAMUSCULAR; INTRAVENOUS PRN
Status: DISCONTINUED | OUTPATIENT
Start: 2024-08-29 | End: 2024-08-30 | Stop reason: SDUPTHER

## 2024-08-29 RX ORDER — PROPOFOL 10 MG/ML
INJECTION, EMULSION INTRAVENOUS PRN
Status: DISCONTINUED | OUTPATIENT
Start: 2024-08-29 | End: 2024-08-30 | Stop reason: SDUPTHER

## 2024-08-29 RX ORDER — SODIUM CHLORIDE, SODIUM LACTATE, POTASSIUM CHLORIDE, CALCIUM CHLORIDE 600; 310; 30; 20 MG/100ML; MG/100ML; MG/100ML; MG/100ML
INJECTION, SOLUTION INTRAVENOUS CONTINUOUS PRN
Status: DISCONTINUED | OUTPATIENT
Start: 2024-08-29 | End: 2024-08-30 | Stop reason: SDUPTHER

## 2024-08-29 RX ADMIN — ROCURONIUM BROMIDE 50 MG: 10 INJECTION, SOLUTION INTRAVENOUS at 20:18

## 2024-08-29 RX ADMIN — PROPOFOL 50 MG: 10 INJECTION, EMULSION INTRAVENOUS at 23:35

## 2024-08-29 RX ADMIN — ROCURONIUM BROMIDE 20 MG: 10 INJECTION, SOLUTION INTRAVENOUS at 23:04

## 2024-08-29 RX ADMIN — MORPHINE SULFATE 4 MG: 4 INJECTION, SOLUTION INTRAMUSCULAR; INTRAVENOUS at 02:56

## 2024-08-29 RX ADMIN — METHOCARBAMOL TABLETS 1000 MG: 500 TABLET, COATED ORAL at 14:41

## 2024-08-29 RX ADMIN — MORPHINE SULFATE 4 MG: 4 INJECTION, SOLUTION INTRAMUSCULAR; INTRAVENOUS at 07:40

## 2024-08-29 RX ADMIN — MIDAZOLAM 2 MG: 1 INJECTION INTRAMUSCULAR; INTRAVENOUS at 20:10

## 2024-08-29 RX ADMIN — MORPHINE SULFATE 4 MG: 4 INJECTION, SOLUTION INTRAMUSCULAR; INTRAVENOUS at 05:25

## 2024-08-29 RX ADMIN — AMLODIPINE BESYLATE 5 MG: 5 TABLET ORAL at 08:24

## 2024-08-29 RX ADMIN — LIDOCAINE HYDROCHLORIDE 100 MG: 20 INJECTION, SOLUTION INTRAVENOUS at 20:18

## 2024-08-29 RX ADMIN — MORPHINE SULFATE 4 MG: 4 INJECTION, SOLUTION INTRAMUSCULAR; INTRAVENOUS at 18:04

## 2024-08-29 RX ADMIN — SODIUM CHLORIDE: 9 INJECTION, SOLUTION INTRAVENOUS at 02:29

## 2024-08-29 RX ADMIN — ROCURONIUM BROMIDE 25 MG: 10 INJECTION, SOLUTION INTRAVENOUS at 21:34

## 2024-08-29 RX ADMIN — ARFORMOTEROL TARTRATE: 15 SOLUTION RESPIRATORY (INHALATION) at 10:52

## 2024-08-29 RX ADMIN — PROPOFOL 100 MG: 10 INJECTION, EMULSION INTRAVENOUS at 20:18

## 2024-08-29 RX ADMIN — SODIUM CHLORIDE, POTASSIUM CHLORIDE, SODIUM LACTATE AND CALCIUM CHLORIDE: 600; 310; 30; 20 INJECTION, SOLUTION INTRAVENOUS at 19:57

## 2024-08-29 RX ADMIN — FENTANYL CITRATE 50 MCG: 0.05 INJECTION, SOLUTION INTRAMUSCULAR; INTRAVENOUS at 20:41

## 2024-08-29 RX ADMIN — CEFAZOLIN 3 G: 1 INJECTION, POWDER, FOR SOLUTION INTRAMUSCULAR; INTRAVENOUS at 20:41

## 2024-08-29 RX ADMIN — DEXAMETHASONE SODIUM PHOSPHATE 10 MG: 10 INJECTION INTRAMUSCULAR; INTRAVENOUS at 20:21

## 2024-08-29 RX ADMIN — Medication 40 MG: at 20:18

## 2024-08-29 RX ADMIN — ROCURONIUM BROMIDE 50 MG: 10 INJECTION, SOLUTION INTRAVENOUS at 20:37

## 2024-08-29 RX ADMIN — FENTANYL CITRATE 50 MCG: 0.05 INJECTION, SOLUTION INTRAMUSCULAR; INTRAVENOUS at 23:35

## 2024-08-29 RX ADMIN — OXYCODONE HYDROCHLORIDE 15 MG: 15 TABLET ORAL at 15:54

## 2024-08-29 RX ADMIN — METOPROLOL SUCCINATE 25 MG: 25 TABLET, EXTENDED RELEASE ORAL at 08:24

## 2024-08-29 RX ADMIN — OXYCODONE HYDROCHLORIDE 15 MG: 15 TABLET ORAL at 11:21

## 2024-08-29 RX ADMIN — GABAPENTIN 300 MG: 300 CAPSULE ORAL at 08:24

## 2024-08-29 RX ADMIN — MORPHINE SULFATE 4 MG: 4 INJECTION, SOLUTION INTRAMUSCULAR; INTRAVENOUS at 14:41

## 2024-08-29 RX ADMIN — WATER 2000 MG: 1 INJECTION INTRAMUSCULAR; INTRAVENOUS; SUBCUTANEOUS at 05:30

## 2024-08-29 RX ADMIN — ROCURONIUM BROMIDE 30 MG: 10 INJECTION, SOLUTION INTRAVENOUS at 22:28

## 2024-08-29 RX ADMIN — MORPHINE SULFATE 4 MG: 4 INJECTION, SOLUTION INTRAMUSCULAR; INTRAVENOUS at 12:28

## 2024-08-29 RX ADMIN — GABAPENTIN 300 MG: 300 CAPSULE ORAL at 14:41

## 2024-08-29 RX ADMIN — METHOCARBAMOL TABLETS 1000 MG: 500 TABLET, COATED ORAL at 08:24

## 2024-08-29 RX ADMIN — MORPHINE SULFATE 4 MG: 4 INJECTION, SOLUTION INTRAMUSCULAR; INTRAVENOUS at 00:52

## 2024-08-29 RX ADMIN — FENTANYL CITRATE 50 MCG: 0.05 INJECTION, SOLUTION INTRAMUSCULAR; INTRAVENOUS at 22:28

## 2024-08-29 RX ADMIN — FENTANYL CITRATE 100 MCG: 0.05 INJECTION, SOLUTION INTRAMUSCULAR; INTRAVENOUS at 20:18

## 2024-08-29 RX ADMIN — PROPOFOL 100 MG: 10 INJECTION, EMULSION INTRAVENOUS at 20:24

## 2024-08-29 RX ADMIN — Medication 10 MG: at 20:45

## 2024-08-29 RX ADMIN — MORPHINE SULFATE 4 MG: 4 INJECTION, SOLUTION INTRAMUSCULAR; INTRAVENOUS at 10:15

## 2024-08-29 RX ADMIN — SODIUM CHLORIDE, PRESERVATIVE FREE 10 ML: 5 INJECTION INTRAVENOUS at 12:28

## 2024-08-29 RX ADMIN — ROCURONIUM BROMIDE 25 MG: 10 INJECTION, SOLUTION INTRAVENOUS at 21:00

## 2024-08-29 ASSESSMENT — PAIN DESCRIPTION - FREQUENCY
FREQUENCY: CONTINUOUS

## 2024-08-29 ASSESSMENT — PAIN - FUNCTIONAL ASSESSMENT
PAIN_FUNCTIONAL_ASSESSMENT: PREVENTS OR INTERFERES WITH MANY ACTIVE NOT PASSIVE ACTIVITIES
PAIN_FUNCTIONAL_ASSESSMENT: INTOLERABLE, UNABLE TO DO ANY ACTIVE OR PASSIVE ACTIVITIES
PAIN_FUNCTIONAL_ASSESSMENT: PREVENTS OR INTERFERES WITH MANY ACTIVE NOT PASSIVE ACTIVITIES
PAIN_FUNCTIONAL_ASSESSMENT: PREVENTS OR INTERFERES WITH MANY ACTIVE NOT PASSIVE ACTIVITIES

## 2024-08-29 ASSESSMENT — PAIN DESCRIPTION - ORIENTATION
ORIENTATION: RIGHT;LEFT
ORIENTATION: LEFT;RIGHT
ORIENTATION: RIGHT;LEFT

## 2024-08-29 ASSESSMENT — PAIN SCALES - GENERAL
PAINLEVEL_OUTOF10: 10
PAINLEVEL_OUTOF10: 3
PAINLEVEL_OUTOF10: 10
PAINLEVEL_OUTOF10: 4
PAINLEVEL_OUTOF10: 5
PAINLEVEL_OUTOF10: 10
PAINLEVEL_OUTOF10: 10
PAINLEVEL_OUTOF10: 4
PAINLEVEL_OUTOF10: 5
PAINLEVEL_OUTOF10: 9
PAINLEVEL_OUTOF10: 10
PAINLEVEL_OUTOF10: 3
PAINLEVEL_OUTOF10: 10
PAINLEVEL_OUTOF10: 10
PAINLEVEL_OUTOF10: 4
PAINLEVEL_OUTOF10: 10
PAINLEVEL_OUTOF10: 10

## 2024-08-29 ASSESSMENT — PAIN DESCRIPTION - ONSET
ONSET: GRADUAL

## 2024-08-29 ASSESSMENT — PAIN DESCRIPTION - DESCRIPTORS
DESCRIPTORS: ACHING;DISCOMFORT;SORE
DESCRIPTORS: ACHING;DISCOMFORT
DESCRIPTORS: ACHING;DISCOMFORT;SORE
DESCRIPTORS: ACHING;DISCOMFORT;SORE
DESCRIPTORS: ACHING;DISCOMFORT

## 2024-08-29 ASSESSMENT — PAIN DESCRIPTION - PAIN TYPE
TYPE: SURGICAL PAIN
TYPE: ACUTE PAIN;SURGICAL PAIN

## 2024-08-29 ASSESSMENT — PAIN DESCRIPTION - LOCATION
LOCATION: ARM;RIB CAGE
LOCATION: ARM
LOCATION: ARM;RIB CAGE
LOCATION: ARM
LOCATION: ARM;RIB CAGE
LOCATION: ARM
LOCATION: ARM
LOCATION: ARM;RIB CAGE

## 2024-08-29 ASSESSMENT — PAIN SCALES - WONG BAKER
WONGBAKER_NUMERICALRESPONSE: NO HURT

## 2024-08-29 ASSESSMENT — LIFESTYLE VARIABLES: SMOKING_STATUS: 1

## 2024-08-29 NOTE — CARE COORDINATION
Care Coordination   The patient was admitted after falling out of a tree. He has a right olecranon fracture, open right radius fracture, open right ulna fracture. The patient is nwb on bilateral upper extremities. The patient os post day #2 bilateral distal radius I/d. The patient was npo after midnight for more surgery this am for a left distal radius removal of the external fixator with orif. Urology to follow up if ambulatory discuss voiding trial. Will need pt ot post op to define discharge plan. Last pt [facility] 16/24 and he walked 230 feet min assist, ot eval is pending. His plan most likely will be to return home  with his wife once more stable. He is on iv morphine 2-4 mg iv q4 prn x 5 doses. Faxed Short term disability paperwork to Dr Lucero to fill out and fax to the patients employer. Acute rehab is following as well.

## 2024-08-29 NOTE — PROGRESS NOTES
PCP is Zach Pulliam MD  Office notified of admission.      Electronically signed by Desiree Arellano RN on 8/29/2024 at 8:51 AM

## 2024-08-29 NOTE — PROGRESS NOTES
TRAUMA SURGERY  DAILY PROGRESS NOTE    Patient's Name/Date of Birth: Cheikh Freedman / 1990    Date: 2024     Chief Complaint   Patient presents with    Trauma        Subjective:  NPO for OR with ortho today. Tolerating room air, SMI 2000. Blood pressure improving this am.       Objective:  Last 24Hrs  Temp  Av.6 °F (36.4 °C)  Min: 97.1 °F (36.2 °C)  Max: 98.2 °F (36.8 °C)  Resp  Av.5  Min: 14  Max: 20  Pulse  Av.7  Min: 82  Max: 114  Systolic (24hrs), Av , Min:126 , Max:166     Diastolic (24hrs), Av, Min:78, Max:98    SpO2  Av.3 %  Min: 97 %  Max: 100 %    I/O last 3 completed shifts:  In: 1000 [I.V.:1000]  Out: 3755 [Urine:3750; Blood:5]      General: resting in chair  Cardiovascular: Warm throughout, no edema  Respiratory: no respiratory distress, equal chest rise. SMI 2000. Pain to palpation over fx ribs.  Abdomen: soft,  nontender, nondistended  Skin: no obvious rashes or lesions appreciated, no jaundice  Extremities: bilateral upper extremities in splint, ex fix on LUE, motor and sensation intact, moving bilateral LE      CBC  Recent Labs     24  0555 24  0600   WBC 11.7* 11.7*   RBC 3.51* 3.25*   HGB 11.1* 10.4*   HCT 32.3* 30.0*   MCV 92.0 92.3   MCH 31.6 32.0   MCHC 34.4 34.7*   RDW 12.5 12.6    199   MPV 10.5 10.1       CMP  Recent Labs     24  0555 24  0600    138   K 4.4 4.1   CL 98 100   CO2 27 27   BUN 11 14   CREATININE 0.6* 0.6*   GLUCOSE 130* 142*   CALCIUM 8.4* 8.6   BILITOT 5.4* 1.6*  1.6*   ALKPHOS 89 112   * 57*   * 260*         Assessment/Plan:    Patient Active Problem List   Diagnosis    Fall from tree, initial encounter    Poor venous access    Open fracture of distal end of radius    Closed fracture of right elbow    Closed fracture of multiple ribs of both sides    Closed fracture of right distal radius    Closed fracture of multiple ribs       34 y.o. male s/p fall from tree with L 6 rib fx, R  6-7 rib, open R distal radius fx, closed olecranon fx, open L distal radius fx s/p ex fix    - Nasal saline 2 sprays each nostril x4/day for remained dried blood in nasal vault from fall.   - Elevated bilirubin and liver enzymes: Improved yesterday, will follow today's labs.   -NPO for OR today 8/29  - OR 8/29 for defentive fixation, discussed today's Lovenox with ortho, is okay.   - su to be kept in place until more ambulatory per Uro   - encourage deep breathing and incentive spirometry. Continue breathing treatments.   - manage pain and nausea as needed   - monitor hemodynamics, Continue Norvasc and metoprolol.   - 16/24 with PT  - PMR said re-evaluate after surgeries with orthopedic surgery are completed   - encourage up and out of bed as able   - DVT ppx: Lovenox  - Continue multimodal pain control      Hillary Zuniga DO  Surgery Resident, PGY-1    Electronically signed on 8/29/2024 at 5:57 AM     Attending Physician Statement:  I have examined the patient, reviewed the record, and discussed the case with the surgical team.  I agree with the assessment and plan with the following additions, corrections, and changes.      No acute events. No new complaints.   BP improved  Going to OR today with hand surgery    Electronically signed by Kavon De Anda MD on 8/29/24 at 3:54 PM EDT

## 2024-08-29 NOTE — ANESTHESIA PRE PROCEDURE
Department of Anesthesiology  Preprocedure Note       Name:  Cheikh Freedman   Age:  34 y.o.  :  1990                                          MRN:  [id number]         Date:  2024      Surgeon: Surgeon(s):  Tavon Lucero DO    Procedure: Procedure(s):  LEFT DISTAL RADIUS REMOVAL OF EXTERNAL FIXATOR WITH LEFT DISTAL RADIUS OPEN REDUCTION INTERNAL FIXATION, RIGHT DISTAL RADIUS OPEN REDUCTION INTERNAL FIXATION    Medications prior to admission:   Prior to Admission medications    Medication Sig Start Date End Date Taking? Authorizing Provider   ADDERALL XR 20 MG capsule Take 1 capsule by mouth Daily. Max Daily Amount: 20 mg 8/3/24  Yes Provider, MD Mark   albuterol (PROVENTIL) (2.5 MG/3ML) 0.083% nebulizer solution Take 3 mLs by nebulization every 6 hours as needed for Wheezing   Yes Provider, MD Mark   fluticasone-salmeterol (ADVAIR HFA) 45-21 MCG/ACT inhaler Inhale 2 puffs into the lungs 2 times daily   Yes ProviderMark MD   dupilumab (DUPIXENT) 300 MG/2ML SOPN injection Inject 2 mLs into the skin once   Yes Provider, MD Mark       Current medications:    Current Facility-Administered Medications   Medication Dose Route Frequency Provider Last Rate Last Admin   • sodium chloride (OCEAN, BABY AYR) 0.65 % nasal spray 2 spray  2 spray Each Nostril Q6H PRN Hillary Zuniga DO       • metoprolol succinate (TOPROL XL) extended release tablet 25 mg  25 mg Oral Daily Hillary Zuniga DO   25 mg at 24 0824   • labetalol (NORMODYNE;TRANDATE) injection 10 mg  10 mg IntraVENous Q30 Min PRN Hillary Zuniga DO       • hydrALAZINE (APRESOLINE) injection 10 mg  10 mg IntraVENous Q30 Min PRN Hillary Zuniga DO       • lidocaine 4 % external patch 1 patch  1 patch TransDERmal Daily Concepción Jain DO   1 patch at 24 0749   • enoxaparin (LOVENOX) injection 40 mg  40 mg SubCUTAneous BID Concepción Jain DO   40 mg at 24   • oxyCODONE HCl (OXY-IR)

## 2024-08-29 NOTE — PROGRESS NOTES
Pt requested to order dinner after surgery. Diet changed to do so. Reiterated that the patient is NPO and to not eat or drink until after surgery

## 2024-08-30 ENCOUNTER — APPOINTMENT (OUTPATIENT)
Dept: GENERAL RADIOLOGY | Age: 34
DRG: 511 | End: 2024-08-30
Payer: COMMERCIAL

## 2024-08-30 LAB
ALBUMIN SERPL-MCNC: 3.3 G/DL (ref 3.5–5.2)
ALP SERPL-CCNC: 97 U/L (ref 40–129)
ALT SERPL-CCNC: 155 U/L (ref 0–40)
ANION GAP SERPL CALCULATED.3IONS-SCNC: 12 MMOL/L (ref 7–16)
AST SERPL-CCNC: 49 U/L (ref 0–39)
BASOPHILS # BLD: 0.02 K/UL (ref 0–0.2)
BASOPHILS NFR BLD: 0 % (ref 0–2)
BILIRUB SERPL-MCNC: 1 MG/DL (ref 0–1.2)
BUN SERPL-MCNC: 15 MG/DL (ref 6–20)
CALCIUM SERPL-MCNC: 8.7 MG/DL (ref 8.6–10.2)
CHLORIDE SERPL-SCNC: 98 MMOL/L (ref 98–107)
CO2 SERPL-SCNC: 26 MMOL/L (ref 22–29)
CREAT SERPL-MCNC: 0.6 MG/DL (ref 0.7–1.2)
EOSINOPHIL # BLD: 0.01 K/UL (ref 0.05–0.5)
EOSINOPHILS RELATIVE PERCENT: 0 % (ref 0–6)
ERYTHROCYTE [DISTWIDTH] IN BLOOD BY AUTOMATED COUNT: 13 % (ref 11.5–15)
GFR, ESTIMATED: >90 ML/MIN/1.73M2
GLUCOSE SERPL-MCNC: 224 MG/DL (ref 74–99)
HCT VFR BLD AUTO: 32.4 % (ref 37–54)
HGB BLD-MCNC: 10.7 G/DL (ref 12.5–16.5)
IMM GRANULOCYTES # BLD AUTO: 0.26 K/UL (ref 0–0.58)
IMM GRANULOCYTES NFR BLD: 2 % (ref 0–5)
LYMPHOCYTES NFR BLD: 0.78 K/UL (ref 1.5–4)
LYMPHOCYTES RELATIVE PERCENT: 7 % (ref 20–42)
MCH RBC QN AUTO: 30.3 PG (ref 26–35)
MCHC RBC AUTO-ENTMCNC: 33 G/DL (ref 32–34.5)
MCV RBC AUTO: 91.8 FL (ref 80–99.9)
MONOCYTES NFR BLD: 0.43 K/UL (ref 0.1–0.95)
MONOCYTES NFR BLD: 4 % (ref 2–12)
NEUTROPHILS NFR BLD: 87 % (ref 43–80)
NEUTS SEG NFR BLD: 10.04 K/UL (ref 1.8–7.3)
PLATELET # BLD AUTO: 261 K/UL (ref 130–450)
PMV BLD AUTO: 9.9 FL (ref 7–12)
POTASSIUM SERPL-SCNC: 4.1 MMOL/L (ref 3.5–5)
PROT SERPL-MCNC: 6.2 G/DL (ref 6.4–8.3)
RBC # BLD AUTO: 3.53 M/UL (ref 3.8–5.8)
SODIUM SERPL-SCNC: 136 MMOL/L (ref 132–146)
WBC OTHER # BLD: 11.5 K/UL (ref 4.5–11.5)

## 2024-08-30 PROCEDURE — 6370000000 HC RX 637 (ALT 250 FOR IP)

## 2024-08-30 PROCEDURE — 6360000002 HC RX W HCPCS

## 2024-08-30 PROCEDURE — 94669 MECHANICAL CHEST WALL OSCILL: CPT

## 2024-08-30 PROCEDURE — 80053 COMPREHEN METABOLIC PANEL: CPT

## 2024-08-30 PROCEDURE — 97530 THERAPEUTIC ACTIVITIES: CPT

## 2024-08-30 PROCEDURE — 6370000000 HC RX 637 (ALT 250 FOR IP): Performed by: STUDENT IN AN ORGANIZED HEALTH CARE EDUCATION/TRAINING PROGRAM

## 2024-08-30 PROCEDURE — 85025 COMPLETE CBC W/AUTO DIFF WBC: CPT

## 2024-08-30 PROCEDURE — 99232 SBSQ HOSP IP/OBS MODERATE 35: CPT | Performed by: SURGERY

## 2024-08-30 PROCEDURE — 2580000003 HC RX 258

## 2024-08-30 PROCEDURE — 94640 AIRWAY INHALATION TREATMENT: CPT

## 2024-08-30 PROCEDURE — 2500000003 HC RX 250 WO HCPCS

## 2024-08-30 PROCEDURE — 97535 SELF CARE MNGMENT TRAINING: CPT

## 2024-08-30 PROCEDURE — 1200000000 HC SEMI PRIVATE

## 2024-08-30 PROCEDURE — 2500000003 HC RX 250 WO HCPCS: Performed by: ANESTHESIOLOGY

## 2024-08-30 RX ORDER — ONDANSETRON 2 MG/ML
INJECTION INTRAMUSCULAR; INTRAVENOUS PRN
Status: DISCONTINUED | OUTPATIENT
Start: 2024-08-30 | End: 2024-08-30 | Stop reason: SDUPTHER

## 2024-08-30 RX ORDER — NALOXONE HYDROCHLORIDE 0.4 MG/ML
INJECTION, SOLUTION INTRAMUSCULAR; INTRAVENOUS; SUBCUTANEOUS PRN
Status: DISCONTINUED | OUTPATIENT
Start: 2024-08-30 | End: 2024-08-30 | Stop reason: HOSPADM

## 2024-08-30 RX ORDER — DIPHENHYDRAMINE HYDROCHLORIDE 50 MG/ML
12.5 INJECTION INTRAMUSCULAR; INTRAVENOUS
Status: DISCONTINUED | OUTPATIENT
Start: 2024-08-30 | End: 2024-08-30 | Stop reason: HOSPADM

## 2024-08-30 RX ORDER — SODIUM CHLORIDE 9 MG/ML
INJECTION, SOLUTION INTRAVENOUS PRN
Status: DISCONTINUED | OUTPATIENT
Start: 2024-08-30 | End: 2024-08-30 | Stop reason: HOSPADM

## 2024-08-30 RX ORDER — SODIUM CHLORIDE 0.9 % (FLUSH) 0.9 %
5-40 SYRINGE (ML) INJECTION EVERY 12 HOURS SCHEDULED
Status: DISCONTINUED | OUTPATIENT
Start: 2024-08-30 | End: 2024-08-30 | Stop reason: HOSPADM

## 2024-08-30 RX ORDER — ALBUTEROL SULFATE 90 UG/1
AEROSOL, METERED RESPIRATORY (INHALATION) PRN
Status: DISCONTINUED | OUTPATIENT
Start: 2024-08-30 | End: 2024-08-30 | Stop reason: SDUPTHER

## 2024-08-30 RX ORDER — HYDROMORPHONE HYDROCHLORIDE 1 MG/ML
0.5 INJECTION, SOLUTION INTRAMUSCULAR; INTRAVENOUS; SUBCUTANEOUS EVERY 5 MIN PRN
Status: COMPLETED | OUTPATIENT
Start: 2024-08-30 | End: 2024-08-30

## 2024-08-30 RX ORDER — SODIUM CHLORIDE 0.9 % (FLUSH) 0.9 %
5-40 SYRINGE (ML) INJECTION PRN
Status: DISCONTINUED | OUTPATIENT
Start: 2024-08-30 | End: 2024-08-30 | Stop reason: HOSPADM

## 2024-08-30 RX ORDER — HYDROMORPHONE HYDROCHLORIDE 1 MG/ML
0.25 INJECTION, SOLUTION INTRAMUSCULAR; INTRAVENOUS; SUBCUTANEOUS EVERY 5 MIN PRN
Status: DISCONTINUED | OUTPATIENT
Start: 2024-08-30 | End: 2024-08-30 | Stop reason: HOSPADM

## 2024-08-30 RX ORDER — GLYCOPYRROLATE 0.2 MG/ML
INJECTION INTRAMUSCULAR; INTRAVENOUS PRN
Status: DISCONTINUED | OUTPATIENT
Start: 2024-08-30 | End: 2024-08-30 | Stop reason: SDUPTHER

## 2024-08-30 RX ORDER — MEPERIDINE HYDROCHLORIDE 25 MG/ML
12.5 INJECTION INTRAMUSCULAR; INTRAVENOUS; SUBCUTANEOUS EVERY 5 MIN PRN
Status: DISCONTINUED | OUTPATIENT
Start: 2024-08-30 | End: 2024-08-30 | Stop reason: HOSPADM

## 2024-08-30 RX ADMIN — OXYCODONE HYDROCHLORIDE 15 MG: 15 TABLET ORAL at 12:46

## 2024-08-30 RX ADMIN — LABETALOL HYDROCHLORIDE 10 MG: 5 INJECTION INTRAVENOUS at 18:31

## 2024-08-30 RX ADMIN — MORPHINE SULFATE 4 MG: 4 INJECTION, SOLUTION INTRAMUSCULAR; INTRAVENOUS at 04:58

## 2024-08-30 RX ADMIN — OXYCODONE HYDROCHLORIDE 15 MG: 15 TABLET ORAL at 22:32

## 2024-08-30 RX ADMIN — AMLODIPINE BESYLATE 5 MG: 5 TABLET ORAL at 08:50

## 2024-08-30 RX ADMIN — ONDANSETRON 4 MG: 2 INJECTION INTRAMUSCULAR; INTRAVENOUS at 00:35

## 2024-08-30 RX ADMIN — ARFORMOTEROL TARTRATE: 15 SOLUTION RESPIRATORY (INHALATION) at 09:30

## 2024-08-30 RX ADMIN — GABAPENTIN 300 MG: 300 CAPSULE ORAL at 14:08

## 2024-08-30 RX ADMIN — PROPOFOL 30 MG: 10 INJECTION, EMULSION INTRAVENOUS at 00:29

## 2024-08-30 RX ADMIN — SUGAMMADEX 200 MG: 100 INJECTION, SOLUTION INTRAVENOUS at 01:05

## 2024-08-30 RX ADMIN — OXYCODONE HYDROCHLORIDE 15 MG: 15 TABLET ORAL at 18:34

## 2024-08-30 RX ADMIN — METHOCARBAMOL TABLETS 1000 MG: 500 TABLET, COATED ORAL at 20:28

## 2024-08-30 RX ADMIN — GLYCOPYRROLATE 0.2 MG: 0.2 INJECTION INTRAMUSCULAR; INTRAVENOUS at 00:38

## 2024-08-30 RX ADMIN — ENOXAPARIN SODIUM 40 MG: 100 INJECTION SUBCUTANEOUS at 08:48

## 2024-08-30 RX ADMIN — MORPHINE SULFATE 4 MG: 4 INJECTION, SOLUTION INTRAMUSCULAR; INTRAVENOUS at 23:38

## 2024-08-30 RX ADMIN — METOPROLOL SUCCINATE 25 MG: 25 TABLET, EXTENDED RELEASE ORAL at 08:50

## 2024-08-30 RX ADMIN — ALBUTEROL SULFATE 2 PUFF: 90 AEROSOL, METERED RESPIRATORY (INHALATION) at 00:38

## 2024-08-30 RX ADMIN — MORPHINE SULFATE 4 MG: 4 INJECTION, SOLUTION INTRAMUSCULAR; INTRAVENOUS at 14:06

## 2024-08-30 RX ADMIN — OXYCODONE HYDROCHLORIDE 15 MG: 15 TABLET ORAL at 08:50

## 2024-08-30 RX ADMIN — METHOCARBAMOL TABLETS 1000 MG: 500 TABLET, COATED ORAL at 16:18

## 2024-08-30 RX ADMIN — GABAPENTIN 300 MG: 300 CAPSULE ORAL at 20:28

## 2024-08-30 RX ADMIN — CEFAZOLIN 3 G: 1 INJECTION, POWDER, FOR SOLUTION INTRAMUSCULAR; INTRAVENOUS at 00:32

## 2024-08-30 RX ADMIN — ALBUTEROL SULFATE 2 PUFF: 90 AEROSOL, METERED RESPIRATORY (INHALATION) at 00:40

## 2024-08-30 RX ADMIN — METHOCARBAMOL TABLETS 1000 MG: 500 TABLET, COATED ORAL at 12:45

## 2024-08-30 RX ADMIN — SODIUM CHLORIDE, PRESERVATIVE FREE 10 ML: 5 INJECTION INTRAVENOUS at 20:29

## 2024-08-30 RX ADMIN — MORPHINE SULFATE 4 MG: 4 INJECTION, SOLUTION INTRAMUSCULAR; INTRAVENOUS at 20:29

## 2024-08-30 RX ADMIN — ENOXAPARIN SODIUM 40 MG: 100 INJECTION SUBCUTANEOUS at 20:29

## 2024-08-30 RX ADMIN — SUGAMMADEX 400 MG: 100 INJECTION, SOLUTION INTRAVENOUS at 00:45

## 2024-08-30 RX ADMIN — HYDROMORPHONE HYDROCHLORIDE 0.5 MG: 1 INJECTION, SOLUTION INTRAMUSCULAR; INTRAVENOUS; SUBCUTANEOUS at 02:09

## 2024-08-30 RX ADMIN — TAMSULOSIN HYDROCHLORIDE 0.4 MG: 0.4 CAPSULE ORAL at 20:28

## 2024-08-30 RX ADMIN — MORPHINE SULFATE 4 MG: 4 INJECTION, SOLUTION INTRAMUSCULAR; INTRAVENOUS at 16:17

## 2024-08-30 RX ADMIN — SODIUM CHLORIDE: 9 INJECTION, SOLUTION INTRAVENOUS at 03:33

## 2024-08-30 RX ADMIN — ARFORMOTEROL TARTRATE: 15 SOLUTION RESPIRATORY (INHALATION) at 20:38

## 2024-08-30 RX ADMIN — MORPHINE SULFATE 4 MG: 4 INJECTION, SOLUTION INTRAMUSCULAR; INTRAVENOUS at 10:40

## 2024-08-30 RX ADMIN — METHOCARBAMOL TABLETS 1000 MG: 500 TABLET, COATED ORAL at 08:49

## 2024-08-30 RX ADMIN — GABAPENTIN 300 MG: 300 CAPSULE ORAL at 08:49

## 2024-08-30 RX ADMIN — HYDROMORPHONE HYDROCHLORIDE 0.5 MG: 1 INJECTION, SOLUTION INTRAMUSCULAR; INTRAVENOUS; SUBCUTANEOUS at 02:14

## 2024-08-30 ASSESSMENT — PAIN DESCRIPTION - LOCATION
LOCATION: WRIST;ELBOW
LOCATION: ARM
LOCATION: ARM;ELBOW
LOCATION: ARM
LOCATION: ARM;ELBOW
LOCATION: RIB CAGE;ARM
LOCATION: ARM
LOCATION: ARM
LOCATION: ARM;ELBOW

## 2024-08-30 ASSESSMENT — PAIN SCALES - GENERAL
PAINLEVEL_OUTOF10: 10
PAINLEVEL_OUTOF10: 10
PAINLEVEL_OUTOF10: 9
PAINLEVEL_OUTOF10: 10
PAINLEVEL_OUTOF10: 8
PAINLEVEL_OUTOF10: 8
PAINLEVEL_OUTOF10: 10
PAINLEVEL_OUTOF10: 10
PAINLEVEL_OUTOF10: 8
PAINLEVEL_OUTOF10: 10
PAINLEVEL_OUTOF10: 10
PAINLEVEL_OUTOF10: 0
PAINLEVEL_OUTOF10: 9
PAINLEVEL_OUTOF10: 9
PAINLEVEL_OUTOF10: 10
PAINLEVEL_OUTOF10: 10
PAINLEVEL_OUTOF10: 9
PAINLEVEL_OUTOF10: 10
PAINLEVEL_OUTOF10: 0

## 2024-08-30 ASSESSMENT — PAIN - FUNCTIONAL ASSESSMENT
PAIN_FUNCTIONAL_ASSESSMENT: 0-10
PAIN_FUNCTIONAL_ASSESSMENT: PREVENTS OR INTERFERES WITH MANY ACTIVE NOT PASSIVE ACTIVITIES

## 2024-08-30 ASSESSMENT — PAIN DESCRIPTION - ORIENTATION
ORIENTATION: RIGHT;LEFT

## 2024-08-30 ASSESSMENT — PAIN DESCRIPTION - DESCRIPTORS
DESCRIPTORS: ACHING;BURNING;DISCOMFORT
DESCRIPTORS: ACHING;DISCOMFORT;SHARP
DESCRIPTORS: ACHING;DISCOMFORT;PRESSURE
DESCRIPTORS: ACHING;DISCOMFORT;SORE
DESCRIPTORS: ACHING;DISCOMFORT;SHARP
DESCRIPTORS: ACHING;BURNING;CRAMPING
DESCRIPTORS: ACHING;DISCOMFORT;THROBBING
DESCRIPTORS: ACHING;DISCOMFORT;SHARP
DESCRIPTORS: ACHING;BURNING;CRAMPING
DESCRIPTORS: ACHING;DISCOMFORT;SHARP

## 2024-08-30 ASSESSMENT — PAIN SCALES - WONG BAKER
WONGBAKER_NUMERICALRESPONSE: NO HURT

## 2024-08-30 ASSESSMENT — PAIN DESCRIPTION - PAIN TYPE: TYPE: SURGICAL PAIN

## 2024-08-30 NOTE — OP NOTE
Operative Note      Patient: Cheikh Freedman  YOB: 1990  MRN: [id number]    Date of Procedure: 8/29/2024    Pre-Op Diagnosis Codes:      * Bilateral radial fractures, open type III, with routine healing, subsequent encounter [S52.91XF, S52.92XF]     * Closed fracture of olecranon process of right ulna, initial encounter [S52.021A]    Post-Op Diagnosis: Same       Procedure:  Left wrist external fixator removal  Left distal radius open reduction internal fixation, greater than 3 intra-articular fragments  Right distal radius open reduction internal fixation, greater than 3 intra-articular fragments  Right olecranon open reduction internal fixation    Surgeon(s):  Tavon Lucero DO Drew, Stuart L, DO Gentile, John Vincent, DO    Assistant:   Resident: Blade Dean DO; Phil Mckeon DO    Anesthesia: General    Estimated Blood Loss (mL): less than 100     Complications: None    Specimens:   * No specimens in log *    Implants:  Implant Name Type Inv. Item Serial No.  Lot No. LRB No. Used Action   GRAFT BNE SUB 15ML 1.7-10MM CANC CHIP MORSELIZED FRZ DRY - [id number]  GRAFT BNE SUB 15ML 1.7-10MM CANC CHIP MORSELIZED FRZ DRY [id number] MUSCULOSKELETAL TRANSPLANT [facility] [place]  Left 1 Implanted   WRIST SPANNING PLATE TI - [id number]  WRIST SPANNING PLATE TI  [employer] [facility]  Left 1 Implanted   SCREW BNE L12MM DIA2.4MM BILLY SKYLAR LO PROF FOR COMPHSVE FT - [id number]  SCREW BNE L12MM DIA2.4MM BILLY SKYLAR LO PROF FOR COMPHSVE FT  [employer] [facility]  Left 1 Implanted   SCREW TI NATE KREULOCK   2.4X12 - [id number]  SCREW TI NATE KREULOCK   2.4X12  [employer]  Left 1 Implanted   SCREW TI NATE KREULOCK 2.4X 14 - [id number]  SCREW TI NATE KREULOCK 2.4X 14  [employer] [facility]  Left 1 Implanted   SCREW TI NATE KREULOCK   2.4X 16 - [id number]  SCREW TI NATE KREULOCK   2.4X 16  [employer]  Left 1 Implanted   SCREW BNE L18MM DIA3.5MM TI LO PROF FOR ANK FRAC SET - [id number]  SCREW BNE L18MM DIA3.5MM

## 2024-08-30 NOTE — PROGRESS NOTES
Messaged attending: \"Pt has brought up to this RN he has been coughing out occasionally with some phlegm.\\"

## 2024-08-30 NOTE — PROGRESS NOTES
Patient back to floor from procedure. Family in room. Bilateral upper extremities dressing dry and intact. No complaints of pain.

## 2024-08-30 NOTE — PROGRESS NOTES
NWB BUE, R rib fractures (6th and 7th)  RUE splinted past elbow     Assessment of current deficits   [x] Functional mobility   [x]ADLs  [x] Strength               []Cognition   [x] Functional transfers   [x] IADLs         [x] Safety Awareness   [x]Endurance   [x] Fine Motor Coordination [x] Balance [] Vision/perception   []Sensation    [x] Gross Motor Coordination [x] ROM  [] Delirium                  [] Motor Control     OT PLAN OF CARE   OT POC based on physician orders, patient diagnosis and results of clinical assessment    Frequency/Duration 2-4 days/wk for 2 weeks PRN   Specific OT Treatment to include:   * Instruction/training on adapted ADL techniques and AE recommendations to increase functional independence within precautions       * Training on energy conservation strategies, correct breathing pattern and techniques to improve independence/tolerance for self-care routine  * Functional transfer/mobility training/DME recommendations for increased independence, safety, and fall prevention  * Patient/Family education to increase follow through with safety techniques and functional independence  * Recommendation of environmental modifications for increased safety with functional transfers/mobility and ADLs  * Therapeutic exercise to improve motor endurance, ROM, and functional strength for ADLs/functional transfers  * Therapeutic activities to facilitate/challenge dynamic balance, stand tolerance for increased safety and independence with ADLs  * Positioning to improve skin integrity, interaction with environment and functional independence    Recommended Adaptive Equipment: TBD      Home Living:  Pt lives with family - spouse, 3 children (11,7,3 yo), small dog   in a 1 story house with 1 step to enter  and no hand rails    Bedroom setup: main level    Bathroom setup: main level  tub/shower combination   Pt does not have a basement   Equipment owned:   none    Prior Level of Function:  Pt I with ADLs , I with  Atilio  Without AD, in room and hallway Mod I     Balance Sitting: Stand by assist       Standing: Min A   Sitting: Stand by assist       Standing: Min A   Sitting: Mod I       Standing: Mod I    Activity Tolerance Fair   fair Good   Visual/  Perceptual wears glasses and no glasses present         Glasses present       BUE  ROM/Strength/  Fine motor Coordination Hand dominance: right     RUE: ROM impaired in all rnages - reports pain in elbow with attempts at shoulder mobility      Strength: NT   able to move all digits in splint     LUE: ROM shoulder and elbow WFL      Strength: NT      Able to move all digits  NT d/t BUE splints post op    Safety   Fair +  Good during functional activity      Hearing: WFL  Sensation: no c/o numbness or tingling   Tone: WFL   Edema: edema in BUE     Comments:  Cleared by RN to see pt. Upon arrival patient semi-supine in bed and agreeable to OT session. At end of session, patient seated in long sit in bed with HOB elevated, wife present throughout session, with call light and phone within reach, all lines and tubes intact.  Overall patient demonstrated  decreased independence and safety during completion of ADL/functional transfer/mobility tasks.     Treatment: Therapist facilitated ADL tasks, functional transfers, functional mobility, bed mobility to address safety awareness, implementation of fall prevention strategies, & safety awareness throughout ADLs. Pt would benefit from continued skilled OT to increase safety and independence with completion of ADL/IADL tasks for functional independence and quality of life.    Education: Pt educated on role of OT in acute setting, benefits of participation in ADL tasks, compensatory methods, use of call light in room, WB statuses,  EC/WS tasks, importance of oob activities expectation of rehab post-DC. Education provided to pt and spouse regarding need for assistance upon return home, pt likely requiring assist with all ADLs and IADLs d/t NWB

## 2024-08-30 NOTE — PROGRESS NOTES
Department of Orthopedic Surgery  Progress Note    Patient seen and examined at bedside this morning.  Overall his pain is controlled.  He is due to go boost to do paresthesias.  We discussed his surgery as well as postoperative plan moving forward.  All of his questions and concerns were addressed.    VITALS:  /74   Pulse 91   Temp 97 °F (36.1 °C) (Temporal)   Resp 14   Ht 1.88 m (6' 2\")   Wt (!) 145.2 kg (320 lb)   SpO2 99%   BMI 41.09 kg/m²     General: Awake alert cooperative no acute distress    MUSCULOSKELETAL:  Bilateral upper extremity  Splint clean dry and intact.  compartments soft and compressible where palpable around the splint  +AIN/PIN/Ulnar nerve function intact grossly although weak due to pain  Brisk Cap refill, hand warm and perfused  Sensation intact to touch in radial/ulnar/median nerve distributions to hand subjectively    CBC:   Lab Results   Component Value Date/Time    WBC 11.5 08/30/2024 05:00 AM    HGB 10.7 08/30/2024 05:00 AM    HCT 32.4 08/30/2024 05:00 AM     08/30/2024 05:00 AM     PT/INR:    Lab Results   Component Value Date/Time    PROTIME 10.4 08/23/2024 09:05 PM    INR 1.0 08/23/2024 09:05 PM           ASSESSMENT  S/P left distal radius removal of external fixator and open reduction internal fixation with dorsal spanning plate, right distal radius open reduction internal fixation, right olecranon process open reduction internal fixation.-8/29/2024    PLAN      Continue physical therapy and protocol: NWB - BUE  24 hour abx coverage to be completed today  Deep venous thrombosis prophylaxis -okay to resume today from an orthopedic standpoint, early mobilization  PT/OT  Pain Control: IV and PO Multimodal wean narcotics as able  Monitor H&H 10.7 this morning  D/C Plan: Okay for discharge from an orthopedic standpoint when appropriate plan is coordinated in place.  Orthopedics will follow peripherally at this point please call with any questions or concerns.

## 2024-08-30 NOTE — PROGRESS NOTES
Physical Therapy  Treatment Note    Name: Cheikh Freedman  : 1990  MRN: [id number]      Date of Service: 2024    Evaluating PT: Mak Chase, PT, DPT [id number]      Room #:  5415/5415-B  Diagnosis:  Poor venous access [I87.8]  Fall from tree, initial encounter [W14.XXXA]  PMHx/PSHx:   has no past medical history on file.  Procedure/Surgery:  Left distal radius I&D with external fixator application; right distal radius I&D with closed reduction and splinting; right olecranon closed reduction and splinting       LEFT DISTAL RADIUS REMOVAL OF EXTERNAL FIXATOR WITH LEFT DISTAL RADIUS OPEN REDUCTION INTERNAL FIXATION   RIGHT DISTAL RADIUS OPEN REDUCTION INTERNAL FIXATION, RIGHT OLECRANON OPEN REDUCTION INTERNAL FIXATION        Precautions:  Fall risk, NWB B UE, R rib Fx    SUBJECTIVE:    Pt lives with wife and 3 children in a single story house with 1 stair(s) and no rail(s) to enter. Pt ambulated without AD prior to admission.    OBJECTIVE:   Initial Evaluation  Date: 24 Treatment Date: 24 Short Term/ Long Term   Goals   AM-PAC 6 Clicks 16/24 15/24    Was pt agreeable to Eval/treatment? Yes Yes    Does pt have pain? R elbow and groin pain Moderate pain    Bed Mobility  Rolling: NT  Supine to sit: Mark  Sit to supine: NT  Scooting: Mark to EOB Rolling: NT  Supine to sit: ModA  Sit to supine: NT  Scooting: Mark to EOB Rolling: Independent   Supine to sit: Independent   Sit to supine: Independent   Scooting: Independent    Transfers Sit to stand: Mark  Stand to sit: Mark  Stand pivot: Mark without AD Sit to stand: Mark  Stand to sit: Mark  Stand pivot: Mark without AD Sit to stand: Independent   Stand to sit: Independent   Stand pivot: Independent    Ambulation   50 feet without AD with Mark 40' x 2 Mark no AD >200 feet without AD Independent    Stair negotiation: ascended and descended NT NT 4 steps with no rail Independent    ROM BUE: Refer to OT note  BLE: WFL     Strength BUE: Refer to OT

## 2024-08-30 NOTE — CARE COORDINATION
Care Coordination  The patient was admitted after falling out of a tree. He is non weight bearing on bilateral upper extremities. The patient is post op left distal radius removal of external fixator. Then had an open reduction internal fixation of the left distal radius. Right distal radius open reduction internal fixation. Await post op therapies today. Acute rehab is following and plan most likely will be for the patient to return home his wife. Spoke to the patient and his wife at bedside that if the patient  walks greater the 50 feet with [employer] insurance acute rehab wont be approved. Gave the patients wife a skilled list also to get choices. Await pt ot [facility] and they are working with the patient currently. Per the wife she wants a referral made to [facility]. Referral was made to [facility] . Await await if accepted. [facility] has no beds. [facility] was made to [facility] if accepted.

## 2024-08-30 NOTE — PROGRESS NOTES
Comprehensive Nutrition Assessment    Type and Reason for Visit:  Initial (LOS)    Nutrition Recommendations/Plan:   Recommend and start Ensure high protein supplement BID and Alvaro wound healing supplement BID to help meet increased nutritional needs from surgical wound healing.         Malnutrition Assessment:  Malnutrition Status:  At risk for malnutrition (Comment) (08/30/24 1111)    Context:  Acute Illness     Findings of the 6 clinical characteristics of malnutrition:  Energy Intake:  75% or less of estimated energy requirements for 7 or more days  Weight Loss:  Unable to assess (d/t lack of weight history)     Body Fat Loss:  Unable to assess     Muscle Mass Loss:  Unable to assess    Fluid Accumulation:  No significant fluid accumulation     Strength:  Not Performed    Nutrition Assessment:    Patient adm s/p fall from a tree ; noted fracture of multiple ribs/fracture of right elbow/fracture of distal end of right radius ; s/p left distal radius removal of external fixator and open reduction internal fixation with dorsal spanning plate, right distal radius open reduction internal fixation, right olecranon process open reduction internal fixation  ; will provide recommendations    Nutrition Related Findings:    -I&Os (-12.6 L), trace edema, A&O x 3, rounded abd, obesity, elevated liver enzymes ; Wound Type: Multiple, Surgical Incision (Incisions x 8)       Current Nutrition Intake & Therapies:    Average Meal Intake: 51-75% (no meals recorded in flowheets since admission)     ADULT DIET; Regular    Anthropometric Measures:  Height: 188 cm (6' 2\")  Ideal Body Weight (IBW): 190 lbs (86 kg)       Current Body Weight: 145.2 kg (320 lb) (8/23, stated), 168.4 % IBW.    Current BMI (kg/m2): 41.1  Usual Body Weight:  (UTO ; no weights available in EMR hx from previous encounters)                       BMI Categories: Obese Class 3 (BMI 40.0 or greater)    Estimated Daily Nutrient Needs:  Energy Requirements Based  On: Formula  Weight Used for Energy Requirements: Current  Energy (kcal/day): 5227-3851 (REE 2459 x 1.2 SF)  Weight Used for Protein Requirements: Ideal  Protein (g/day): 130-155 (1.5-1.8g/kg IBW)  Method Used for Fluid Requirements: 1 ml/kcal  Fluid (ml/day): 6330-1102    Nutrition Diagnosis:   Increased nutrient needs related to increase demand for energy/nutrients as evidenced by wounds (surgical)    Nutrition Interventions:   Food and/or Nutrient Delivery: Continue Current Diet, Start Oral Nutrition Supplement  Nutrition Education/Counseling: Education not indicated  Coordination of Nutrition Care: Continue to monitor while inpatient       Goals:  Previous Goal Met: Progressing toward Goal(s)  Goals: Meet at least 75% of estimated needs, by next RD assessment       Nutrition Monitoring and Evaluation:   Behavioral-Environmental Outcomes: None Identified  Food/Nutrient Intake Outcomes: Food and Nutrient Intake, Supplement Intake  Physical Signs/Symptoms Outcomes: Biochemical Data, Chewing or Swallowing, GI Status, Fluid Status or Edema, Hemodynamic Status, Meal Time Behavior, Weight, Skin, Nutrition Focused Physical Findings    Discharge Planning:    Too soon to determine     Taiwo Hernandez RD, LD  Contact: [phone]

## 2024-08-30 NOTE — PROGRESS NOTES
TRAUMA SURGERY  DAILY PROGRESS NOTE    Patient's Name/Date of Birth: Cheikh Freedman / 1990    Date: 2024     Chief Complaint   Patient presents with    Trauma        Subjective:  S/p OR with ortho . Tolerating room air. Blood pressure improved. Receiving IV normal saline.     Objective:  Last 24Hrs  Temp  Av.2 °F (36.2 °C)  Min: 96.8 °F (36 °C)  Max: 97.9 °F (36.6 °C)  Resp  Av.7  Min: 14  Max: 20  Pulse  Av.7  Min: 88  Max: 103  Systolic (24hrs), Av , Min:132 , Max:168     Diastolic (24hrs), Av, Min:68, Max:89    SpO2  Av.3 %  Min: 95 %  Max: 100 %    I/O last 3 completed shifts:  In: 4394.1 [I.V.:4394.1]  Out: 4400 [Urine:4400]      General: resting in chair  Cardiovascular: Warm throughout, no edema  Respiratory: no respiratory distress, equal chest rise.Pain to palpation over fx ribs.  Abdomen: soft,  nontender, nondistended  Skin: no obvious rashes or lesions appreciated, no jaundice  Extremities: bilateral upper extremities in splint s/p ORIF with ortho     CBC  Recent Labs     24  0600 24  0511 24  0500   WBC 11.7* 11.2 11.5   RBC 3.25* 3.22* 3.53*   HGB 10.4* 10.1* 10.7*   HCT 30.0* 30.7* 32.4*   MCV 92.3 95.3 91.8   MCH 32.0 31.4 30.3   MCHC 34.7* 32.9 33.0   RDW 12.6 13.3 13.0    235 261   MPV 10.1 10.3 9.9       CMP  Recent Labs     24  0600 24  0511    138   K 4.1 4.0    102   CO2 27 26   BUN 14 14   CREATININE 0.6* 0.6*   GLUCOSE 142* 82   CALCIUM 8.6 8.4*   BILITOT 1.6*  1.6* 1.2   ALKPHOS 112 105   AST 57* 43*   * 185*         Assessment/Plan:    Patient Active Problem List   Diagnosis    Fall from tree, initial encounter    Poor venous access    Open fracture of distal end of radius    Closed fracture of right elbow    Closed fracture of multiple ribs of both sides    Closed fracture of right distal radius    Closed fracture of multiple ribs       34 y.o. male s/p fall from tree with L 6 rib fx, R

## 2024-08-30 NOTE — CONSULTS
Department of Hand Surgery  Attending Consult Note          Reason for Consult: Bilateral extremity injuries    HISTORY OF PRESENT ILLNESS:       Patient is a 34 y.o. male who presents with bilateral wrist as well as right elbow pain.  Patient states he was cutting down a tree and was on a ladder when the ladder slipped and he fell does not know how far he fell.  He had immediate pain and deformity and open wounds to bilateral wrists.  He was immediately brought into the trauma bay [facility] emergency department.  Denies any other orthopedic complaints at this time.  Patient was taken by the orthopedic on-call for bilateral wrist I&D left wrist external fixation placement.  Patient had a repeat I&D 2 days ago.  Hand surgery was consulted for complexity of patient's upper extremity injury.  Patient is right-hand dominant.  Patient has any numbness or tingling today.  Patient is tolerating his splints.    Past Medical History:    History reviewed. No pertinent past medical history.  Past Surgical History:        Procedure Laterality Date    ELBOW SURGERY Bilateral 8/27/2024    Bilateral Distal Radius Incision and Drainage performed by Giacomo Norman DO at [facility] [place]    FOREARM SURGERY Bilateral 8/23/2024    I&D OF BILATERAL OPEN DISTAL RADIUS FRACTURES WITH CLOSED REDUCTION OF RIGHT DISTAL RADIUS FRACTURE AS WELL AS EXTERNAL FIXATION OF LEFT DISTAL RADIUS FRACTURE AND SPLINT APPLICATION OF RIGHT OLECRANON FRACTURE performed by Mehdi Marinelli DO at [facility] [place]     Current Medications:   Current Facility-Administered Medications: naloxone 0.4 mg in 10 mL sodium chloride syringe, , IntraVENous, PRN  sodium chloride flush 0.9 % injection 5-40 mL, 5-40 mL, IntraVENous, 2 times per day  sodium chloride flush 0.9 % injection 5-40 mL, 5-40 mL, IntraVENous, PRN  0.9 % sodium chloride infusion, , IntraVENous, PRN  meperidine (DEMEROL) injection 12.5 mg, 12.5 mg, IntraVENous, Q5 Min PRN  diphenhydrAMINE (BENADRYL) injection  EXAM:    VITALS:  /74   Pulse 88   Temp 97.9 °F (36.6 °C) (Temporal)   Resp 16   Ht 1.88 m (6' 2\")   Wt (!) 145.2 kg (320 lb)   SpO2 97%   BMI 41.09 kg/m²   CONSTITUTIONAL:  awake, alert, cooperative, no apparent distress, and appears stated age  MUSCULOSKELETAL:  Right upper Extremity:  Splint in place  Subjectively states sensation intact to light touch in  radial, ulnar, median nerve distributions of the hand and fingers  Maintains motor function to AIN/PIN/ulnar nerves however slight limited secondary to pain  Palpable radial and ulnar pulse, brisk cap refill, warm well-perfused     Left upper extremity:  Splint in place, patient has good flexion extension all digits, patient has good sensation to light touch to the radial, ulnar and median nerve distributions thepalpable radial and ulnar pulse, brisk capillary refill  See previous orthopedic consult from the ED for patient's open fracture wounds.    DATA:    CBC:   Lab Results   Component Value Date/Time    WBC 11.2 08/29/2024 05:11 AM    RBC 3.22 08/29/2024 05:11 AM    HGB 10.1 08/29/2024 05:11 AM    HCT 30.7 08/29/2024 05:11 AM    MCV 95.3 08/29/2024 05:11 AM    MCH 31.4 08/29/2024 05:11 AM    MCHC 32.9 08/29/2024 05:11 AM    RDW 13.3 08/29/2024 05:11 AM     08/29/2024 05:11 AM    MPV 10.3 08/29/2024 05:11 AM     PT/INR:    Lab Results   Component Value Date/Time    PROTIME 10.4 08/23/2024 09:05 PM    INR 1.0 08/23/2024 09:05 PM       Radiology Review:  Radiology Review:  X-ray right wrist demonstrates comminuted distal radius fracture with intra-articular extension.  There is significant dorsal displacement of the distal block.     X-ray right elbow demonstrates olecranon fracture with mild comminution, with a medial coronoid fragment      X-ray left wrist demonstrates highly comminuted displaced and significantly displaced distal radius fracture with bone loss and significant volar wound.  Distal ulna is exposed on

## 2024-08-30 NOTE — ANESTHESIA POSTPROCEDURE EVALUATION
Department of Anesthesiology  Postprocedure Note    Patient: Cheikh Freedman  MRN: [id number]  YOB: 1990  Date of evaluation: 8/30/2024    Procedure Summary       Date: 08/29/24 Room / Location: [address]    Anesthesia Start: 2008 Anesthesia Stop: 08/30/24 0121    Procedures:       LEFT DISTAL RADIUS REMOVAL OF EXTERNAL FIXATOR WITH LEFT DISTAL RADIUS OPEN REDUCTION INTERNAL FIXATION (Left: Arm Lower)      RIGHT DISTAL RADIUS OPEN REDUCTION INTERNAL FIXATION, RIGHT OLECRANON OPEN REDUCTION INTERNAL FIXATION (Right: Arm Lower) Diagnosis:       Bilateral radial fractures, open type III, with routine healing, subsequent encounter      Closed fracture of olecranon process of right ulna, initial encounter      (Bilateral radial fractures, open type III, with routine healing, subsequent encounter [S52.91XF, S52.92XF])      (Closed fracture of olecranon process of right ulna, initial encounter [S52.021A])    Surgeons: Tavon Lucero DO Responsible Provider: Cory Grimm MD    Anesthesia Type: General ASA Status: 3            Anesthesia Type: General    Ada Phase I: Ada Score: 10    Ada Phase II:      Anesthesia Post Evaluation    Patient location during evaluation: PACU  Patient participation: complete - patient participated  Level of consciousness: awake and alert  Airway patency: patent  Nausea & Vomiting: no nausea and no vomiting  Cardiovascular status: hemodynamically stable  Respiratory status: acceptable  Hydration status: euvolemic  Multimodal analgesia pain management approach  Pain management: adequate    No notable events documented.

## 2024-08-30 NOTE — CARE COORDINATION
Continuing to follow patient.  Patient is post op day 1.  Need updated therapies.  Patient's pain will need to be well controlled on oral pain medication.

## 2024-08-31 PROCEDURE — 6360000002 HC RX W HCPCS

## 2024-08-31 PROCEDURE — 6370000000 HC RX 637 (ALT 250 FOR IP)

## 2024-08-31 PROCEDURE — 1200000000 HC SEMI PRIVATE

## 2024-08-31 PROCEDURE — 99232 SBSQ HOSP IP/OBS MODERATE 35: CPT | Performed by: SURGERY

## 2024-08-31 PROCEDURE — 2580000003 HC RX 258

## 2024-08-31 PROCEDURE — 94669 MECHANICAL CHEST WALL OSCILL: CPT

## 2024-08-31 PROCEDURE — 6370000000 HC RX 637 (ALT 250 FOR IP): Performed by: STUDENT IN AN ORGANIZED HEALTH CARE EDUCATION/TRAINING PROGRAM

## 2024-08-31 PROCEDURE — 94640 AIRWAY INHALATION TREATMENT: CPT

## 2024-08-31 RX ADMIN — ARFORMOTEROL TARTRATE: 15 SOLUTION RESPIRATORY (INHALATION) at 20:36

## 2024-08-31 RX ADMIN — TAMSULOSIN HYDROCHLORIDE 0.4 MG: 0.4 CAPSULE ORAL at 21:04

## 2024-08-31 RX ADMIN — MORPHINE SULFATE 4 MG: 4 INJECTION, SOLUTION INTRAMUSCULAR; INTRAVENOUS at 18:32

## 2024-08-31 RX ADMIN — METHOCARBAMOL TABLETS 1000 MG: 500 TABLET, COATED ORAL at 16:54

## 2024-08-31 RX ADMIN — GABAPENTIN 300 MG: 300 CAPSULE ORAL at 21:04

## 2024-08-31 RX ADMIN — GABAPENTIN 300 MG: 300 CAPSULE ORAL at 08:52

## 2024-08-31 RX ADMIN — OXYCODONE HYDROCHLORIDE 15 MG: 15 TABLET ORAL at 03:16

## 2024-08-31 RX ADMIN — METOPROLOL SUCCINATE 25 MG: 25 TABLET, EXTENDED RELEASE ORAL at 08:52

## 2024-08-31 RX ADMIN — MORPHINE SULFATE 4 MG: 4 INJECTION, SOLUTION INTRAMUSCULAR; INTRAVENOUS at 22:04

## 2024-08-31 RX ADMIN — SODIUM CHLORIDE, PRESERVATIVE FREE 10 ML: 5 INJECTION INTRAVENOUS at 21:05

## 2024-08-31 RX ADMIN — SODIUM CHLORIDE, PRESERVATIVE FREE 10 ML: 5 INJECTION INTRAVENOUS at 08:50

## 2024-08-31 RX ADMIN — GABAPENTIN 300 MG: 300 CAPSULE ORAL at 12:52

## 2024-08-31 RX ADMIN — METHOCARBAMOL TABLETS 1000 MG: 500 TABLET, COATED ORAL at 08:51

## 2024-08-31 RX ADMIN — ENOXAPARIN SODIUM 40 MG: 100 INJECTION SUBCUTANEOUS at 08:50

## 2024-08-31 RX ADMIN — OXYCODONE HYDROCHLORIDE 15 MG: 15 TABLET ORAL at 16:54

## 2024-08-31 RX ADMIN — METHOCARBAMOL TABLETS 1000 MG: 500 TABLET, COATED ORAL at 21:04

## 2024-08-31 RX ADMIN — ENOXAPARIN SODIUM 40 MG: 100 INJECTION SUBCUTANEOUS at 21:04

## 2024-08-31 RX ADMIN — METHOCARBAMOL TABLETS 1000 MG: 500 TABLET, COATED ORAL at 12:52

## 2024-08-31 RX ADMIN — MORPHINE SULFATE 4 MG: 4 INJECTION, SOLUTION INTRAMUSCULAR; INTRAVENOUS at 05:33

## 2024-08-31 RX ADMIN — OXYCODONE HYDROCHLORIDE 15 MG: 15 TABLET ORAL at 08:51

## 2024-08-31 RX ADMIN — ARFORMOTEROL TARTRATE: 15 SOLUTION RESPIRATORY (INHALATION) at 10:05

## 2024-08-31 RX ADMIN — OXYCODONE HYDROCHLORIDE 15 MG: 15 TABLET ORAL at 12:52

## 2024-08-31 RX ADMIN — OXYCODONE HYDROCHLORIDE 15 MG: 15 TABLET ORAL at 21:04

## 2024-08-31 RX ADMIN — AMLODIPINE BESYLATE 5 MG: 5 TABLET ORAL at 08:51

## 2024-08-31 ASSESSMENT — PAIN SCALES - GENERAL
PAINLEVEL_OUTOF10: 8
PAINLEVEL_OUTOF10: 10
PAINLEVEL_OUTOF10: 8
PAINLEVEL_OUTOF10: 10
PAINLEVEL_OUTOF10: 8
PAINLEVEL_OUTOF10: 10
PAINLEVEL_OUTOF10: 7

## 2024-08-31 ASSESSMENT — PAIN - FUNCTIONAL ASSESSMENT
PAIN_FUNCTIONAL_ASSESSMENT: PREVENTS OR INTERFERES WITH MANY ACTIVE NOT PASSIVE ACTIVITIES
PAIN_FUNCTIONAL_ASSESSMENT: ACTIVITIES ARE NOT PREVENTED
PAIN_FUNCTIONAL_ASSESSMENT: PREVENTS OR INTERFERES SOME ACTIVE ACTIVITIES AND ADLS
PAIN_FUNCTIONAL_ASSESSMENT: PREVENTS OR INTERFERES WITH MANY ACTIVE NOT PASSIVE ACTIVITIES
PAIN_FUNCTIONAL_ASSESSMENT: PREVENTS OR INTERFERES SOME ACTIVE ACTIVITIES AND ADLS
PAIN_FUNCTIONAL_ASSESSMENT: PREVENTS OR INTERFERES WITH MANY ACTIVE NOT PASSIVE ACTIVITIES
PAIN_FUNCTIONAL_ASSESSMENT: PREVENTS OR INTERFERES SOME ACTIVE ACTIVITIES AND ADLS

## 2024-08-31 ASSESSMENT — PAIN DESCRIPTION - LOCATION
LOCATION: ARM;ELBOW
LOCATION: ARM
LOCATION: ARM
LOCATION: HAND;ARM
LOCATION: ARM;NECK
LOCATION: ARM

## 2024-08-31 ASSESSMENT — PAIN DESCRIPTION - ORIENTATION
ORIENTATION: RIGHT;LEFT
ORIENTATION: LEFT;RIGHT

## 2024-08-31 ASSESSMENT — PAIN DESCRIPTION - DESCRIPTORS
DESCRIPTORS: ACHING;DISCOMFORT;TIGHTNESS
DESCRIPTORS: ACHING;DISCOMFORT;STABBING
DESCRIPTORS: ACHING;DISCOMFORT;TIGHTNESS
DESCRIPTORS: ACHING;DISCOMFORT;SORE
DESCRIPTORS: ACHING;DISCOMFORT
DESCRIPTORS: ACHING;CRAMPING;THROBBING
DESCRIPTORS: ACHING;DISCOMFORT
DESCRIPTORS: ACHING;JABBING;THROBBING

## 2024-08-31 NOTE — PROGRESS NOTES
Messaged resident Dr Iliana Farfan thru [employer]: \"Hi. Confirming to you the removal of Central IV line IJ? Pt is on morphine IV, still an active order. He is getting either Oxy and Morphine, alternately. Pt has limited access due to surgery on bilateral upper ext. Pls advise removing it before discharging pt, same as the su. Pls confirm.\"    \"iv team with me now and asks me for u to call her #... \"

## 2024-08-31 NOTE — PROGRESS NOTES
Consulted for peripheral iv for removal of ij tlc. Found pt with casts on both arms. Right arm casted from fingers to shoulder, left arm casted from fingers to mid upper arm. Pt has no exposed veins for iv insertion . Unable to place midline or picc line due to casts.  Sept, RN messaged resident.

## 2024-08-31 NOTE — PROGRESS NOTES
TRAUMA SURGERY  DAILY PROGRESS NOTE    Patient's Name/Date of Birth: Cheikh Freedman / 1990    Date: 2024     Chief Complaint   Patient presents with    Trauma        Subjective:  No acute issues, went to OR with ortho yesterday. Otherwise pass gas and having BM. No N/V. Appropriately tender       Objective:  Last 24Hrs  Temp  Av.7 °F (36.5 °C)  Min: 97.4 °F (36.3 °C)  Max: 97.8 °F (36.6 °C)  Resp  Av.5  Min: 16  Max: 18  Pulse  Av.3  Min: 82  Max: 96  Systolic (24hrs), Av , Min:141 , Max:176     Diastolic (24hrs), Av, Min:62, Max:90    SpO2  Av.8 %  Min: 96 %  Max: 99 %    I/O last 3 completed shifts:  In: 6464.1 [P.O.:1080; I.V.:5384.1]  Out: 7000 [Urine:7000]      General: resting in chair  Cardiovascular: Warm throughout, no edema  Respiratory: no respiratory distress, equal chest rise.Pain to palpation over fx ribs.  Abdomen: soft,  nontender, nondistended  Skin: no obvious rashes or lesions appreciated, no jaundice  Extremities: bilateral upper extremities in splint s/p ORIF with ortho     CBC  Recent Labs     24  0511 24  0500   WBC 11.2 11.5   RBC 3.22* 3.53*   HGB 10.1* 10.7*   HCT 30.7* 32.4*   MCV 95.3 91.8   MCH 31.4 30.3   MCHC 32.9 33.0   RDW 13.3 13.0    261   MPV 10.3 9.9       CMP  Recent Labs     24  0511 24  0500    136   K 4.0 4.1    98   CO2 26 26   BUN 14 15   CREATININE 0.6* 0.6*   GLUCOSE 82 224*   CALCIUM 8.4* 8.7   BILITOT 1.2 1.0   ALKPHOS 105 97   AST 43* 49*   * 155*         Assessment/Plan:    Patient Active Problem List   Diagnosis    Fall from tree, initial encounter    Poor venous access    Open fracture of distal end of radius    Closed fracture of right elbow    Closed fracture of multiple ribs of both sides    Closed fracture of right distal radius    Closed fracture of multiple ribs       34 y.o. male s/p fall from tree with L 6 rib fx, R 6-7 rib, open R distal radius fx, closed olecranon

## 2024-08-31 NOTE — FLOWSHEET NOTE
Gen lee messaged that pt asking fir something for breakthru instead of morphine    Update; will keep on morphine since providing pain relief

## 2024-08-31 NOTE — PLAN OF CARE
Problem: Discharge Planning  Goal: Discharge to home or other facility with appropriate resources  8/31/2024 0307 by Elsy Sethi RN  Outcome: Progressing  8/30/2024 2001 by Ayana Graf RN  Outcome: Progressing     Problem: Pain  Goal: Verbalizes/displays adequate comfort level or baseline comfort level  8/31/2024 0307 by Elsy Sethi RN  Outcome: Progressing  8/30/2024 2001 by Ayana Graf RN  Outcome: Progressing  8/30/2024 1308 by Laith Marcelino RN  Outcome: Progressing     Problem: Safety - Adult  Goal: Free from fall injury  8/31/2024 0307 by Elsy Sethi RN  Outcome: Progressing  Flowsheets (Taken 8/30/2024 2345)  Free From Fall Injury: Instruct family/caregiver on patient safety  8/30/2024 2001 by Ayana Graf RN  Outcome: Progressing     Problem: ABCDS Injury Assessment  Goal: Absence of physical injury  8/31/2024 0307 by Elsy Sethi RN  Outcome: Progressing  Flowsheets (Taken 8/30/2024 2345)  Absence of Physical Injury: Implement safety measures based on patient assessment  8/30/2024 2001 by Ayana Graf RN  Outcome: Progressing     Problem: Skin/Tissue Integrity  Goal: Absence of new skin breakdown  Description: 1.  Monitor for areas of redness and/or skin breakdown  2.  Assess vascular access sites hourly  3.  Every 4-6 hours minimum:  Change oxygen saturation probe site  4.  Every 4-6 hours:  If on nasal continuous positive airway pressure, respiratory therapy assess nares and determine need for appliance change or resting period.  8/31/2024 0307 by Elsy Sethi RN  Outcome: Progressing  8/30/2024 2001 by Ayana Graf RN  Outcome: Progressing     Problem: Nutrition Deficit:  Goal: Optimize nutritional status  8/31/2024 0307 by Elsy Sethi RN  Outcome: Progressing  8/30/2024 2001 by Ayana Graf RN  Outcome: Progressing

## 2024-08-31 NOTE — PLAN OF CARE
Problem: Discharge Planning  Goal: Discharge to home or other facility with appropriate resources  8/31/2024 0741 by Laith Marcelino RN  Outcome: Progressing     Problem: Pain  Goal: Verbalizes/displays adequate comfort level or baseline comfort level  8/31/2024 0741 by Laith Marcelino RN  Outcome: Progressing     Problem: Safety - Adult  Goal: Free from fall injury  8/31/2024 0741 by Laith Marcelino RN  Outcome: Progressing

## 2024-08-31 NOTE — FLOWSHEET NOTE
Called old unit and said do not have chair pt used any more, need to put in bed watch to request one if available.  This was placed in bed watch.

## 2024-08-31 NOTE — PLAN OF CARE
Problem: Discharge Planning  Goal: Discharge to home or other facility with appropriate resources  Outcome: Progressing     Problem: Pain  Goal: Verbalizes/displays adequate comfort level or baseline comfort level  8/30/2024 2001 by Ayana Graf, RN  Outcome: Progressing  8/30/2024 1308 by Laith Marcelino, RN  Outcome: Progressing     Problem: Safety - Adult  Goal: Free from fall injury  Outcome: Progressing     Problem: ABCDS Injury Assessment  Goal: Absence of physical injury  Outcome: Progressing     Problem: Skin/Tissue Integrity  Goal: Absence of new skin breakdown  Description: 1.  Monitor for areas of redness and/or skin breakdown  2.  Assess vascular access sites hourly  3.  Every 4-6 hours minimum:  Change oxygen saturation probe site  4.  Every 4-6 hours:  If on nasal continuous positive airway pressure, respiratory therapy assess nares and determine need for appliance change or resting period.  Outcome: Progressing     Problem: Nutrition Deficit:  Goal: Optimize nutritional status  Outcome: Progressing

## 2024-09-01 PROCEDURE — 6370000000 HC RX 637 (ALT 250 FOR IP)

## 2024-09-01 PROCEDURE — 2580000003 HC RX 258

## 2024-09-01 PROCEDURE — 99232 SBSQ HOSP IP/OBS MODERATE 35: CPT | Performed by: SURGERY

## 2024-09-01 PROCEDURE — 6360000002 HC RX W HCPCS

## 2024-09-01 PROCEDURE — 6370000000 HC RX 637 (ALT 250 FOR IP): Performed by: STUDENT IN AN ORGANIZED HEALTH CARE EDUCATION/TRAINING PROGRAM

## 2024-09-01 PROCEDURE — 1200000000 HC SEMI PRIVATE

## 2024-09-01 PROCEDURE — 94640 AIRWAY INHALATION TREATMENT: CPT

## 2024-09-01 PROCEDURE — 97530 THERAPEUTIC ACTIVITIES: CPT

## 2024-09-01 RX ADMIN — OXYCODONE HYDROCHLORIDE 15 MG: 15 TABLET ORAL at 05:02

## 2024-09-01 RX ADMIN — MORPHINE SULFATE 4 MG: 4 INJECTION, SOLUTION INTRAMUSCULAR; INTRAVENOUS at 20:23

## 2024-09-01 RX ADMIN — ARFORMOTEROL TARTRATE: 15 SOLUTION RESPIRATORY (INHALATION) at 09:35

## 2024-09-01 RX ADMIN — MORPHINE SULFATE 4 MG: 4 INJECTION, SOLUTION INTRAMUSCULAR; INTRAVENOUS at 06:09

## 2024-09-01 RX ADMIN — OXYCODONE HYDROCHLORIDE 15 MG: 15 TABLET ORAL at 09:05

## 2024-09-01 RX ADMIN — AMLODIPINE BESYLATE 5 MG: 5 TABLET ORAL at 09:06

## 2024-09-01 RX ADMIN — GABAPENTIN 300 MG: 300 CAPSULE ORAL at 13:07

## 2024-09-01 RX ADMIN — METHOCARBAMOL TABLETS 1000 MG: 500 TABLET, COATED ORAL at 17:14

## 2024-09-01 RX ADMIN — METHOCARBAMOL TABLETS 1000 MG: 500 TABLET, COATED ORAL at 20:22

## 2024-09-01 RX ADMIN — METHOCARBAMOL TABLETS 1000 MG: 500 TABLET, COATED ORAL at 09:06

## 2024-09-01 RX ADMIN — OXYCODONE HYDROCHLORIDE 15 MG: 15 TABLET ORAL at 13:06

## 2024-09-01 RX ADMIN — MORPHINE SULFATE 4 MG: 4 INJECTION, SOLUTION INTRAMUSCULAR; INTRAVENOUS at 02:05

## 2024-09-01 RX ADMIN — SODIUM CHLORIDE, PRESERVATIVE FREE 10 ML: 5 INJECTION INTRAVENOUS at 09:04

## 2024-09-01 RX ADMIN — SODIUM CHLORIDE, PRESERVATIVE FREE 10 ML: 5 INJECTION INTRAVENOUS at 20:23

## 2024-09-01 RX ADMIN — METHOCARBAMOL TABLETS 1000 MG: 500 TABLET, COATED ORAL at 13:06

## 2024-09-01 RX ADMIN — GABAPENTIN 300 MG: 300 CAPSULE ORAL at 09:04

## 2024-09-01 RX ADMIN — OXYCODONE HYDROCHLORIDE 15 MG: 15 TABLET ORAL at 01:01

## 2024-09-01 RX ADMIN — ENOXAPARIN SODIUM 40 MG: 100 INJECTION SUBCUTANEOUS at 20:22

## 2024-09-01 RX ADMIN — MORPHINE SULFATE 4 MG: 4 INJECTION, SOLUTION INTRAMUSCULAR; INTRAVENOUS at 15:38

## 2024-09-01 RX ADMIN — TAMSULOSIN HYDROCHLORIDE 0.4 MG: 0.4 CAPSULE ORAL at 20:23

## 2024-09-01 RX ADMIN — GABAPENTIN 300 MG: 300 CAPSULE ORAL at 20:23

## 2024-09-01 RX ADMIN — OXYCODONE HYDROCHLORIDE 15 MG: 15 TABLET ORAL at 17:14

## 2024-09-01 RX ADMIN — OXYCODONE HYDROCHLORIDE 15 MG: 15 TABLET ORAL at 23:03

## 2024-09-01 RX ADMIN — ARFORMOTEROL TARTRATE: 15 SOLUTION RESPIRATORY (INHALATION) at 21:09

## 2024-09-01 RX ADMIN — ENOXAPARIN SODIUM 40 MG: 100 INJECTION SUBCUTANEOUS at 09:04

## 2024-09-01 RX ADMIN — METOPROLOL SUCCINATE 25 MG: 25 TABLET, EXTENDED RELEASE ORAL at 09:04

## 2024-09-01 ASSESSMENT — PAIN DESCRIPTION - DESCRIPTORS
DESCRIPTORS: ACHING;TIGHTNESS;THROBBING
DESCRIPTORS: ACHING;BURNING;SQUEEZING
DESCRIPTORS: ACHING;TENDER;SORE;DISCOMFORT
DESCRIPTORS: ACHING;DISCOMFORT
DESCRIPTORS: DISCOMFORT;ACHING
DESCRIPTORS: ACHING;BURNING;THROBBING
DESCRIPTORS: ACHING;DISCOMFORT
DESCRIPTORS: ACHING;DISCOMFORT;THROBBING
DESCRIPTORS: ACHING;TENDER;TIGHTNESS

## 2024-09-01 ASSESSMENT — PAIN - FUNCTIONAL ASSESSMENT
PAIN_FUNCTIONAL_ASSESSMENT: PREVENTS OR INTERFERES SOME ACTIVE ACTIVITIES AND ADLS
PAIN_FUNCTIONAL_ASSESSMENT: PREVENTS OR INTERFERES WITH MANY ACTIVE NOT PASSIVE ACTIVITIES
PAIN_FUNCTIONAL_ASSESSMENT: PREVENTS OR INTERFERES SOME ACTIVE ACTIVITIES AND ADLS
PAIN_FUNCTIONAL_ASSESSMENT: PREVENTS OR INTERFERES WITH MANY ACTIVE NOT PASSIVE ACTIVITIES
PAIN_FUNCTIONAL_ASSESSMENT: PREVENTS OR INTERFERES SOME ACTIVE ACTIVITIES AND ADLS
PAIN_FUNCTIONAL_ASSESSMENT: PREVENTS OR INTERFERES SOME ACTIVE ACTIVITIES AND ADLS
PAIN_FUNCTIONAL_ASSESSMENT: PREVENTS OR INTERFERES WITH MANY ACTIVE NOT PASSIVE ACTIVITIES

## 2024-09-01 ASSESSMENT — PAIN DESCRIPTION - ORIENTATION
ORIENTATION: RIGHT;LEFT

## 2024-09-01 ASSESSMENT — PAIN SCALES - GENERAL
PAINLEVEL_OUTOF10: 8
PAINLEVEL_OUTOF10: 9
PAINLEVEL_OUTOF10: 9
PAINLEVEL_OUTOF10: 8
PAINLEVEL_OUTOF10: 3
PAINLEVEL_OUTOF10: 4
PAINLEVEL_OUTOF10: 4
PAINLEVEL_OUTOF10: 8
PAINLEVEL_OUTOF10: 3
PAINLEVEL_OUTOF10: 9
PAINLEVEL_OUTOF10: 10
PAINLEVEL_OUTOF10: 9
PAINLEVEL_OUTOF10: 3

## 2024-09-01 ASSESSMENT — PAIN DESCRIPTION - LOCATION
LOCATION: ARM
LOCATION: WRIST
LOCATION: ARM

## 2024-09-01 ASSESSMENT — PAIN DESCRIPTION - PAIN TYPE
TYPE: ACUTE PAIN
TYPE: ACUTE PAIN

## 2024-09-01 ASSESSMENT — PAIN DESCRIPTION - FREQUENCY
FREQUENCY: CONTINUOUS
FREQUENCY: CONTINUOUS

## 2024-09-01 ASSESSMENT — PAIN DESCRIPTION - ONSET: ONSET: ON-GOING

## 2024-09-01 NOTE — PROGRESS NOTES
Physical Therapy  Treatment Note    Name: Cheikh Freedman  : 1990  MRN: [id number]      Date of Service: 2024    Evaluating PT: Mak Chase, PT, DPT [id number]      Room #:  5415/5415-B  Diagnosis:  Poor venous access [I87.8]  Fall from tree, initial encounter [W14.XXXA]  PMHx/PSHx:   has no past medical history on file.  Procedure/Surgery:  Left distal radius I&D with external fixator application; right distal radius I&D with closed reduction and splinting; right olecranon closed reduction and splinting       LEFT DISTAL RADIUS REMOVAL OF EXTERNAL FIXATOR WITH LEFT DISTAL RADIUS OPEN REDUCTION INTERNAL FIXATION   RIGHT DISTAL RADIUS OPEN REDUCTION INTERNAL FIXATION, RIGHT OLECRANON OPEN REDUCTION INTERNAL FIXATION        Precautions:  Fall risk, NWB B UE, R rib Fx    SUBJECTIVE:    Pt lives with wife and 3 children in a single story house with 1 stair(s) and no rail(s) to enter. Pt ambulated without AD prior to admission.    OBJECTIVE:   Initial Evaluation  Date: 24 Treatment Date: 24 Short Term/ Long Term   Goals   AM-PAC 6 Clicks     Was pt agreeable to Eval/treatment? Yes Yes    Does pt have pain? R elbow and groin pain Moderate pain    Bed Mobility  Rolling: NT  Supine to sit: Mark  Sit to supine: NT  Scooting: Mark to EOB Rolling: NT  Supine to sit: Mark  Sit to supine: NT  Scooting: Mark to EOB Rolling: Independent   Supine to sit: Independent   Sit to supine: Independent   Scooting: Independent    Transfers Sit to stand: Mark  Stand to sit: Mark  Stand pivot: Mark without AD Sit to stand: SBA  Stand to sit: SBA  Stand pivot: SBA without AD Sit to stand: Independent   Stand to sit: Independent   Stand pivot: Independent    Ambulation   50 feet without AD with Mark 200 feet with no AD CGA >200 feet without AD Independent    Stair negotiation: ascended and descended NT NT 4 steps with no rail Independent    ROM BUE: Refer to OT note  BLE: WFL     Strength BUE: Refer to OT

## 2024-09-01 NOTE — PROGRESS NOTES
TRAUMA SURGERY  DAILY PROGRESS NOTE    Patient's Name/Date of Birth: Cheikh Freedman / 1990    Date: 2024     Chief Complaint   Patient presents with    Trauma        Subjective:  No acute issues, passing gas and having BM. No N/V. Appropriately tender       Objective:  Last 24Hrs  Temp  Av.3 °F (36.3 °C)  Min: 97 °F (36.1 °C)  Max: 97.5 °F (36.4 °C)  Resp  Av.6  Min: 16  Max: 20  Pulse  Av.7  Min: 87  Max: 94  Systolic (24hrs), Av , Min:152 , Max:158     Diastolic (24hrs), Av, Min:89, Max:90    SpO2  Av %  Min: 97 %  Max: 97 %    I/O last 3 completed shifts:  In: 2100 [P.O.:2100]  Out: 6750 [Urine:6750]      General: resting in chair  Cardiovascular: Warm throughout, no edema  Respiratory: no respiratory distress, equal chest rise.Pain to palpation over fx ribs.  Abdomen: soft,  nontender, nondistended  Skin: no obvious rashes or lesions appreciated, no jaundice  Extremities: bilateral upper extremities in splint s/p ORIF with ortho     CBC  Recent Labs     24  0500   WBC 11.5   RBC 3.53*   HGB 10.7*   HCT 32.4*   MCV 91.8   MCH 30.3   MCHC 33.0   RDW 13.0      MPV 9.9       CMP  Recent Labs     24  0500      K 4.1   CL 98   CO2 26   BUN 15   CREATININE 0.6*   GLUCOSE 224*   CALCIUM 8.7   BILITOT 1.0   ALKPHOS 97   AST 49*   *         Assessment/Plan:    Patient Active Problem List   Diagnosis    Fall from tree, initial encounter    Poor venous access    Open fracture of distal end of radius    Closed fracture of right elbow    Closed fracture of multiple ribs of both sides    Closed fracture of right distal radius    Closed fracture of multiple ribs       34 y.o. male s/p fall from tree with L 6 rib fx, R 6-7 rib, open R distal radius fx, closed olecranon fx, open L distal radius fx s/p ex fix  - S/p ORIF with Ortho .         - Montes to be kept in place until more ambulatory per Uro   -Encourage deep breathing and incentive spirometry.

## 2024-09-01 NOTE — PLAN OF CARE
Problem: Discharge Planning  Goal: Discharge to home or other facility with appropriate resources  8/31/2024 2119 by Elsy Sethi RN  Outcome: Progressing  8/31/2024 0741 by Laith Marcelino RN  Outcome: Progressing     Problem: Pain  Goal: Verbalizes/displays adequate comfort level or baseline comfort level  8/31/2024 2119 by Elsy Sethi RN  Outcome: Progressing  8/31/2024 0741 by Laith Marcelino RN  Outcome: Progressing     Problem: Safety - Adult  Goal: Free from fall injury  8/31/2024 2119 by Elsy Sethi RN  Outcome: Progressing  Flowsheets (Taken 8/31/2024 2100)  Free From Fall Injury: Instruct family/caregiver on patient safety  8/31/2024 0741 by Laith Marcelino RN  Outcome: Progressing     Problem: ABCDS Injury Assessment  Goal: Absence of physical injury  Outcome: Progressing  Flowsheets (Taken 8/31/2024 2100)  Absence of Physical Injury: Implement safety measures based on patient assessment     Problem: Skin/Tissue Integrity  Goal: Absence of new skin breakdown  Description: 1.  Monitor for areas of redness and/or skin breakdown  2.  Assess vascular access sites hourly  3.  Every 4-6 hours minimum:  Change oxygen saturation probe site  4.  Every 4-6 hours:  If on nasal continuous positive airway pressure, respiratory therapy assess nares and determine need for appliance change or resting period.  Outcome: Progressing     Problem: Nutrition Deficit:  Goal: Optimize nutritional status  Outcome: Progressing

## 2024-09-02 PROCEDURE — 2580000003 HC RX 258

## 2024-09-02 PROCEDURE — 6370000000 HC RX 637 (ALT 250 FOR IP)

## 2024-09-02 PROCEDURE — 6360000002 HC RX W HCPCS

## 2024-09-02 PROCEDURE — 1200000000 HC SEMI PRIVATE

## 2024-09-02 PROCEDURE — 99232 SBSQ HOSP IP/OBS MODERATE 35: CPT | Performed by: SURGERY

## 2024-09-02 PROCEDURE — 6370000000 HC RX 637 (ALT 250 FOR IP): Performed by: STUDENT IN AN ORGANIZED HEALTH CARE EDUCATION/TRAINING PROGRAM

## 2024-09-02 PROCEDURE — 94640 AIRWAY INHALATION TREATMENT: CPT

## 2024-09-02 RX ADMIN — AMLODIPINE BESYLATE 5 MG: 5 TABLET ORAL at 07:53

## 2024-09-02 RX ADMIN — METHOCARBAMOL TABLETS 1000 MG: 500 TABLET, COATED ORAL at 13:30

## 2024-09-02 RX ADMIN — SODIUM CHLORIDE, PRESERVATIVE FREE 10 ML: 5 INJECTION INTRAVENOUS at 07:53

## 2024-09-02 RX ADMIN — TAMSULOSIN HYDROCHLORIDE 0.4 MG: 0.4 CAPSULE ORAL at 20:31

## 2024-09-02 RX ADMIN — OXYCODONE HYDROCHLORIDE 15 MG: 15 TABLET ORAL at 20:31

## 2024-09-02 RX ADMIN — METOPROLOL SUCCINATE 25 MG: 25 TABLET, EXTENDED RELEASE ORAL at 07:53

## 2024-09-02 RX ADMIN — SODIUM CHLORIDE, PRESERVATIVE FREE 10 ML: 5 INJECTION INTRAVENOUS at 20:42

## 2024-09-02 RX ADMIN — GABAPENTIN 300 MG: 300 CAPSULE ORAL at 13:30

## 2024-09-02 RX ADMIN — ENOXAPARIN SODIUM 40 MG: 100 INJECTION SUBCUTANEOUS at 07:53

## 2024-09-02 RX ADMIN — GABAPENTIN 300 MG: 300 CAPSULE ORAL at 07:53

## 2024-09-02 RX ADMIN — ARFORMOTEROL TARTRATE: 15 SOLUTION RESPIRATORY (INHALATION) at 21:32

## 2024-09-02 RX ADMIN — METHOCARBAMOL TABLETS 1000 MG: 500 TABLET, COATED ORAL at 20:30

## 2024-09-02 RX ADMIN — OXYCODONE HYDROCHLORIDE 15 MG: 15 TABLET ORAL at 16:16

## 2024-09-02 RX ADMIN — OXYCODONE HYDROCHLORIDE 15 MG: 15 TABLET ORAL at 07:54

## 2024-09-02 RX ADMIN — OXYCODONE HYDROCHLORIDE 15 MG: 15 TABLET ORAL at 03:18

## 2024-09-02 RX ADMIN — ARFORMOTEROL TARTRATE: 15 SOLUTION RESPIRATORY (INHALATION) at 09:15

## 2024-09-02 RX ADMIN — ENOXAPARIN SODIUM 40 MG: 100 INJECTION SUBCUTANEOUS at 20:30

## 2024-09-02 RX ADMIN — METHOCARBAMOL TABLETS 1000 MG: 500 TABLET, COATED ORAL at 18:16

## 2024-09-02 RX ADMIN — GABAPENTIN 300 MG: 300 CAPSULE ORAL at 20:30

## 2024-09-02 RX ADMIN — OXYCODONE HYDROCHLORIDE 15 MG: 15 TABLET ORAL at 12:05

## 2024-09-02 RX ADMIN — METHOCARBAMOL TABLETS 1000 MG: 500 TABLET, COATED ORAL at 07:55

## 2024-09-02 ASSESSMENT — PAIN DESCRIPTION - DESCRIPTORS
DESCRIPTORS: ACHING;DISCOMFORT
DESCRIPTORS: ACHING;DISCOMFORT;TIGHTNESS
DESCRIPTORS: TIGHTNESS;ACHING;DISCOMFORT
DESCRIPTORS: ACHING
DESCRIPTORS: ACHING;DISCOMFORT;TIGHTNESS
DESCRIPTORS: SORE;ACHING;DISCOMFORT

## 2024-09-02 ASSESSMENT — PAIN SCALES - GENERAL
PAINLEVEL_OUTOF10: 10
PAINLEVEL_OUTOF10: 3
PAINLEVEL_OUTOF10: 9
PAINLEVEL_OUTOF10: 10
PAINLEVEL_OUTOF10: 10
PAINLEVEL_OUTOF10: 9
PAINLEVEL_OUTOF10: 3

## 2024-09-02 ASSESSMENT — PAIN DESCRIPTION - LOCATION
LOCATION: ARM
LOCATION: WRIST
LOCATION: ARM
LOCATION: ARM

## 2024-09-02 ASSESSMENT — PAIN - FUNCTIONAL ASSESSMENT
PAIN_FUNCTIONAL_ASSESSMENT: PREVENTS OR INTERFERES SOME ACTIVE ACTIVITIES AND ADLS
PAIN_FUNCTIONAL_ASSESSMENT: PREVENTS OR INTERFERES WITH MANY ACTIVE NOT PASSIVE ACTIVITIES

## 2024-09-02 ASSESSMENT — PAIN DESCRIPTION - ORIENTATION
ORIENTATION: RIGHT;LEFT

## 2024-09-02 ASSESSMENT — PAIN DESCRIPTION - FREQUENCY: FREQUENCY: CONTINUOUS

## 2024-09-02 ASSESSMENT — PAIN SCALES - WONG BAKER: WONGBAKER_NUMERICALRESPONSE: HURTS WORST

## 2024-09-02 ASSESSMENT — PAIN DESCRIPTION - ONSET: ONSET: PROGRESSIVE

## 2024-09-02 NOTE — PROGRESS NOTES
TRAUMA SURGERY  DAILY PROGRESS NOTE    Patient's Name/Date of Birth: Cheikh Freedman / 1990    Date: 2024     Chief Complaint   Patient presents with    Trauma        Subjective:  No acute issues. Resting in bed. Pain managed.      Objective:  Last 24Hrs  Temp  Av.4 °F (36.3 °C)  Min: 96.8 °F (36 °C)  Max: 97.8 °F (36.6 °C)  Resp  Av.8  Min: 17  Max: 20  Pulse  Av  Min: 87  Max: 94  Systolic (24hrs), Av , Min:140 , Max:159     Diastolic (24hrs), Av, Min:59, Max:90    SpO2  Av %  Min: 97 %  Max: 100 %    I/O last 3 completed shifts:  In: 1680 [P.O.:1680]  Out: 7025 [Urine:7025]      General: resting in chair  Cardiovascular: Warm throughout, no edema  Respiratory: no respiratory distress, equal chest rise.Pain to palpation over fx ribs. SMI 2500  Abdomen: soft,  nontender, nondistended  Skin: no obvious rashes or lesions appreciated, no jaundice  Extremities: bilateral upper extremities in splint s/p ORIF with ortho     CBC  No results for input(s): \"WBC\", \"RBC\", \"HGB\", \"HCT\", \"MCV\", \"MCH\", \"MCHC\", \"RDW\", \"PLT\", \"MPV\" in the last 72 hours.      CMP  No results for input(s): \"NA\", \"K\", \"CL\", \"CO2\", \"BUN\", \"CREATININE\", \"GLUCOSE\", \"CALCIUM\", \"LABALBU\", \"BILITOT\", \"ALKPHOS\", \"AST\", \"ALT\" in the last 72 hours.    Invalid input(s): \"PROT\"        Assessment/Plan:    Patient Active Problem List   Diagnosis    Fall from tree, initial encounter    Poor venous access    Open fracture of distal end of radius    Closed fracture of right elbow    Closed fracture of multiple ribs of both sides    Closed fracture of right distal radius    Closed fracture of multiple ribs       34 y.o. male s/p fall from tree with L 6 rib fx, R 6-7 rib, open R distal radius fx, closed olecranon fx, open L distal radius fx s/p ex fix  - S/p ORIF with Ortho .         - Montes to be kept in place until more ambulatory per Uro (voiding trial as just removed this AM)   -Encourage deep breathing and incentive

## 2024-09-02 NOTE — PLAN OF CARE
Problem: Discharge Planning  Goal: Discharge to home or other facility with appropriate resources  9/1/2024 2038 by Elsy Sethi RN  Outcome: Progressing  9/1/2024 1015 by Lizbet Martinez RN  Outcome: Progressing  Flowsheets (Taken 9/1/2024 0800)  Discharge to home or other facility with appropriate resources: Identify barriers to discharge with patient and caregiver     Problem: Pain  Goal: Verbalizes/displays adequate comfort level or baseline comfort level  9/1/2024 2038 by Elsy Sethi RN  Outcome: Progressing  9/1/2024 1015 by Lizbet Martinez RN  Outcome: Progressing     Problem: Safety - Adult  Goal: Free from fall injury  9/1/2024 2038 by Elsy Sethi RN  Outcome: Progressing  Flowsheets (Taken 9/1/2024 2015)  Free From Fall Injury: Instruct family/caregiver on patient safety  9/1/2024 1015 by Lizbet Martinez RN  Outcome: Progressing     Problem: ABCDS Injury Assessment  Goal: Absence of physical injury  9/1/2024 2038 by Elsy Sethi RN  Outcome: Progressing  Flowsheets (Taken 9/1/2024 2015)  Absence of Physical Injury: Implement safety measures based on patient assessment  9/1/2024 1015 by Lizbet Martinez RN  Outcome: Progressing     Problem: Skin/Tissue Integrity  Goal: Absence of new skin breakdown  Description: 1.  Monitor for areas of redness and/or skin breakdown  2.  Assess vascular access sites hourly  3.  Every 4-6 hours minimum:  Change oxygen saturation probe site  4.  Every 4-6 hours:  If on nasal continuous positive airway pressure, respiratory therapy assess nares and determine need for appliance change or resting period.  9/1/2024 2038 by Elsy Sethi RN  Outcome: Progressing  9/1/2024 1015 by Lizbet Martinez RN  Outcome: Progressing     Problem: Nutrition Deficit:  Goal: Optimize nutritional status  9/1/2024 2038 by Elsy Sethi RN  Outcome: Progressing  9/1/2024 1015 by Lizbet Martinez RN  Outcome: Progressing

## 2024-09-02 NOTE — PROGRESS NOTES
Patient ambulated in gandhi about 100 feet. Tolerated well. Voided X2   since su removed for a total of 850 ml.

## 2024-09-03 VITALS
TEMPERATURE: 96.8 F | DIASTOLIC BLOOD PRESSURE: 79 MMHG | BODY MASS INDEX: 40.43 KG/M2 | WEIGHT: 315 LBS | RESPIRATION RATE: 16 BRPM | HEART RATE: 96 BPM | SYSTOLIC BLOOD PRESSURE: 148 MMHG | OXYGEN SATURATION: 98 % | HEIGHT: 74 IN

## 2024-09-03 LAB
ANION GAP SERPL CALCULATED.3IONS-SCNC: 11 MMOL/L (ref 7–16)
BUN SERPL-MCNC: 16 MG/DL (ref 6–20)
CALCIUM SERPL-MCNC: 8.8 MG/DL (ref 8.6–10.2)
CHLORIDE SERPL-SCNC: 100 MMOL/L (ref 98–107)
CO2 SERPL-SCNC: 26 MMOL/L (ref 22–29)
CREAT SERPL-MCNC: 0.7 MG/DL (ref 0.7–1.2)
ERYTHROCYTE [DISTWIDTH] IN BLOOD BY AUTOMATED COUNT: 12.9 % (ref 11.5–15)
GFR, ESTIMATED: >90 ML/MIN/1.73M2
GLUCOSE SERPL-MCNC: 143 MG/DL (ref 74–99)
HCT VFR BLD AUTO: 32.7 % (ref 37–54)
HGB BLD-MCNC: 11.1 G/DL (ref 12.5–16.5)
MCH RBC QN AUTO: 31.8 PG (ref 26–35)
MCHC RBC AUTO-ENTMCNC: 33.9 G/DL (ref 32–34.5)
MCV RBC AUTO: 93.7 FL (ref 80–99.9)
PLATELET # BLD AUTO: 267 K/UL (ref 130–450)
PMV BLD AUTO: 10 FL (ref 7–12)
POTASSIUM SERPL-SCNC: 4.4 MMOL/L (ref 3.5–5)
RBC # BLD AUTO: 3.49 M/UL (ref 3.8–5.8)
SODIUM SERPL-SCNC: 137 MMOL/L (ref 132–146)
WBC OTHER # BLD: 10.3 K/UL (ref 4.5–11.5)

## 2024-09-03 PROCEDURE — 6370000000 HC RX 637 (ALT 250 FOR IP)

## 2024-09-03 PROCEDURE — 94640 AIRWAY INHALATION TREATMENT: CPT

## 2024-09-03 PROCEDURE — 6370000000 HC RX 637 (ALT 250 FOR IP): Performed by: STUDENT IN AN ORGANIZED HEALTH CARE EDUCATION/TRAINING PROGRAM

## 2024-09-03 PROCEDURE — 99238 HOSP IP/OBS DSCHRG MGMT 30/<: CPT | Performed by: SURGERY

## 2024-09-03 PROCEDURE — 6360000002 HC RX W HCPCS

## 2024-09-03 PROCEDURE — 85027 COMPLETE CBC AUTOMATED: CPT

## 2024-09-03 PROCEDURE — 80048 BASIC METABOLIC PNL TOTAL CA: CPT

## 2024-09-03 RX ORDER — ONDANSETRON 4 MG/1
4 TABLET, ORALLY DISINTEGRATING ORAL EVERY 8 HOURS PRN
Qty: 21 TABLET | Refills: 0 | Status: SHIPPED | OUTPATIENT
Start: 2024-09-03 | End: 2024-09-10

## 2024-09-03 RX ORDER — GABAPENTIN 300 MG/1
300 CAPSULE ORAL 3 TIMES DAILY
Qty: 21 CAPSULE | Refills: 0 | Status: SHIPPED | OUTPATIENT
Start: 2024-09-03 | End: 2024-09-10

## 2024-09-03 RX ORDER — METHOCARBAMOL 500 MG/1
1000 TABLET, FILM COATED ORAL 4 TIMES DAILY
Qty: 56 TABLET | Refills: 0 | Status: SHIPPED | OUTPATIENT
Start: 2024-09-03 | End: 2024-09-10

## 2024-09-03 RX ORDER — LIDOCAINE 4 G/G
1 PATCH TOPICAL DAILY
Qty: 7 EACH | Refills: 0 | Status: SHIPPED | OUTPATIENT
Start: 2024-09-04 | End: 2024-09-11

## 2024-09-03 RX ORDER — OXYCODONE HYDROCHLORIDE 10 MG/1
10 TABLET ORAL EVERY 6 HOURS PRN
Qty: 28 TABLET | Refills: 0 | Status: SHIPPED | OUTPATIENT
Start: 2024-09-03 | End: 2024-09-10

## 2024-09-03 RX ORDER — POLYETHYLENE GLYCOL 3350 17 G/17G
17 POWDER, FOR SOLUTION ORAL DAILY
Qty: 7 PACKET | Refills: 0 | Status: SHIPPED | OUTPATIENT
Start: 2024-09-04 | End: 2024-09-11

## 2024-09-03 RX ORDER — METOPROLOL SUCCINATE 25 MG/1
25 TABLET, EXTENDED RELEASE ORAL DAILY
Qty: 30 TABLET | Refills: 3 | Status: SHIPPED | OUTPATIENT
Start: 2024-09-04

## 2024-09-03 RX ORDER — AMLODIPINE BESYLATE 5 MG/1
5 TABLET ORAL DAILY
Qty: 30 TABLET | Refills: 3 | Status: SHIPPED | OUTPATIENT
Start: 2024-09-04

## 2024-09-03 RX ORDER — METHOCARBAMOL 1000 MG/1
1000 TABLET, FILM COATED ORAL 4 TIMES DAILY
Qty: 28 TABLET | Refills: 0 | Status: SHIPPED | OUTPATIENT
Start: 2024-09-03 | End: 2024-09-03

## 2024-09-03 RX ADMIN — ARFORMOTEROL TARTRATE: 15 SOLUTION RESPIRATORY (INHALATION) at 09:17

## 2024-09-03 RX ADMIN — GABAPENTIN 300 MG: 300 CAPSULE ORAL at 07:34

## 2024-09-03 RX ADMIN — AMLODIPINE BESYLATE 5 MG: 5 TABLET ORAL at 07:34

## 2024-09-03 RX ADMIN — ENOXAPARIN SODIUM 40 MG: 100 INJECTION SUBCUTANEOUS at 07:34

## 2024-09-03 RX ADMIN — OXYCODONE HYDROCHLORIDE 15 MG: 15 TABLET ORAL at 17:59

## 2024-09-03 RX ADMIN — OXYCODONE HYDROCHLORIDE 15 MG: 15 TABLET ORAL at 00:37

## 2024-09-03 RX ADMIN — METHOCARBAMOL TABLETS 1000 MG: 500 TABLET, COATED ORAL at 07:34

## 2024-09-03 RX ADMIN — OXYCODONE HYDROCHLORIDE 15 MG: 15 TABLET ORAL at 10:01

## 2024-09-03 RX ADMIN — METOPROLOL SUCCINATE 25 MG: 25 TABLET, EXTENDED RELEASE ORAL at 07:34

## 2024-09-03 RX ADMIN — OXYCODONE HYDROCHLORIDE 15 MG: 15 TABLET ORAL at 06:01

## 2024-09-03 RX ADMIN — METHOCARBAMOL TABLETS 1000 MG: 500 TABLET, COATED ORAL at 14:04

## 2024-09-03 RX ADMIN — GABAPENTIN 300 MG: 300 CAPSULE ORAL at 14:04

## 2024-09-03 RX ADMIN — METHOCARBAMOL TABLETS 1000 MG: 500 TABLET, COATED ORAL at 17:59

## 2024-09-03 RX ADMIN — OXYCODONE HYDROCHLORIDE 15 MG: 15 TABLET ORAL at 14:05

## 2024-09-03 ASSESSMENT — PAIN SCALES - GENERAL
PAINLEVEL_OUTOF10: 10
PAINLEVEL_OUTOF10: 8

## 2024-09-03 ASSESSMENT — ENCOUNTER SYMPTOMS
ABDOMINAL DISTENTION: 0
RESPIRATORY NEGATIVE: 1
COUGH: 0
BLOOD IN STOOL: 0
ABDOMINAL PAIN: 0
VOMITING: 0
EYES NEGATIVE: 1
ANAL BLEEDING: 0
ALLERGIC/IMMUNOLOGIC NEGATIVE: 1
NAUSEA: 0
DIARRHEA: 0
CONSTIPATION: 0
GASTROINTESTINAL NEGATIVE: 1
BACK PAIN: 0
SHORTNESS OF BREATH: 0

## 2024-09-03 ASSESSMENT — PAIN DESCRIPTION - ORIENTATION
ORIENTATION: RIGHT;LEFT
ORIENTATION: RIGHT;LEFT

## 2024-09-03 ASSESSMENT — PAIN DESCRIPTION - DESCRIPTORS
DESCRIPTORS: ACHING;DISCOMFORT
DESCRIPTORS: ACHING;DISCOMFORT

## 2024-09-03 ASSESSMENT — PAIN DESCRIPTION - LOCATION
LOCATION: ARM
LOCATION: ARM

## 2024-09-03 ASSESSMENT — PAIN SCALES - WONG BAKER: WONGBAKER_NUMERICALRESPONSE: NO HURT
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
## 2024-09-03 NOTE — PROGRESS NOTES
[facility]  PROGRESS NOTE  ATTENDING NOTE        TRAUMA  MECHANISM:  fall 25' from tree    Chief Complaint   Patient presents with    Trauma       HPI34 y.o. male   fall from 25ft in tree   Injury occurred Prior to arrival   was cutting branches when he fell onto his hands to break fall. + LOC   No other complaints  No thinners   10 of ketamine by EMS     Patient Active Problem List   Diagnosis    Fall from tree, initial encounter    Poor venous access    Open fracture of distal end of radius    Closed fracture of right elbow    Closed fracture of multiple ribs of both sides    Closed fracture of right distal radius    Closed fracture of multiple ribs       SUBJECTIVE/OVERNIGHT EVENTS:  No new issues, wants to go home    Review of Systems   Constitutional:  Positive for activity change. Negative for appetite change and unexpected weight change.   HENT: Negative.     Eyes: Negative.    Respiratory: Negative.  Negative for cough and shortness of breath.    Cardiovascular: Negative.  Negative for chest pain and leg swelling.   Gastrointestinal: Negative.  Negative for abdominal distention, abdominal pain, anal bleeding, blood in stool, constipation, diarrhea, nausea and vomiting.   Endocrine: Negative.    Genitourinary: Negative.    Musculoskeletal:  Positive for arthralgias and myalgias. Negative for back pain, gait problem and joint swelling.   Skin: Negative.    Allergic/Immunologic: Negative.    Neurological: Negative.  Negative for dizziness, weakness and headaches.   Hematological: Negative.    Psychiatric/Behavioral: Negative.  Negative for confusion, decreased concentration and sleep disturbance.        BP (!) 148/79   Pulse 96   Temp 96.8 °F (36 °C) (Temporal)   Resp 16   Ht 1.88 m (6' 2\")   Wt (!) 145.2 kg (320 lb)   SpO2 98%   BMI 41.09 kg/m²   Physical Exam  Constitutional:       Appearance: Normal appearance. He is obese.   HENT:      Head: Normocephalic and atraumatic.

## 2024-09-03 NOTE — CARE COORDINATION
9/3/2024Social work transition of care planning  Sw followed up with pt's spouse,plan is home. Pt to follow up with Qasim,per spouse.  Electronically signed by TUCKER Marte on 9/3/2024 at 9:40 AM

## 2024-09-03 NOTE — DISCHARGE INSTR - COC
file for this patient.    Active Problems:  Patient Active Problem List   Diagnosis Code    Fall from tree, initial encounter W14.XXXA    Poor venous access I87.8    Open fracture of distal end of radius S52.509B    Closed fracture of right elbow S42.401A    Closed fracture of multiple ribs of both sides S22.43XA    Closed fracture of right distal radius S52.501A    Closed fracture of multiple ribs S22.49XA       Isolation/Infection:   Isolation            No Isolation          Patient Infection Status       None to display                     Nurse Assessment:  Last Vital Signs: BP (!) 148/79   Pulse 96   Temp 96.8 °F (36 °C) (Temporal)   Resp 16   Ht 1.88 m (6' 2\")   Wt (!) 145.2 kg (320 lb)   SpO2 98%   BMI 41.09 kg/m²     Last documented pain score (0-10 scale): Pain Level: 10  Last Weight:   Wt Readings from Last 1 Encounters:   08/23/24 (!) 145.2 kg (320 lb)     Mental Status:  oriented, alert, coherent, logical, thought processes intact, and able to concentrate and follow conversation    IV Access:  - None    Nursing Mobility/ADLs:  Walking   Independent  Transfer  Independent  Bathing  Assisted  Dressing  Assisted  Toileting  Assisted  Feeding  Assisted  Med Admin  Independent  Med Delivery   whole    Wound Care Documentation and Therapy:  Incision 08/23/24 Radial Distal;Left (Active)   Dressing Status Clean;Dry;Intact 09/03/24 0929   Incision Cleansed Not Cleansed 08/31/24 0850   Dressing/Treatment Ace wrap 08/31/24 0850   Closure Sutures 08/24/24 0230   Drainage Amount None (dry) 08/31/24 0850   Leatha-incision Assessment Intact 08/31/24 0850   Number of days: 10       Incision 08/23/24 Wrist Right (Active)   Dressing Status Clean;Dry;Intact 09/03/24 0929   Incision Cleansed Not Cleansed 08/31/24 0850   Dressing/Treatment Ace wrap 08/31/24 0850   Closure Sutures 08/24/24 0230   Drainage Amount None (dry) 08/31/24 0850   Leatha-incision Assessment Intact 08/31/24 0850   Number of days: 10       Incision  Labs or Other Treatments After Discharge: ***    Physician Certification: I certify the above information and transfer of Cheikh Freedman  is necessary for the continuing treatment of the diagnosis listed and that he requires {Admit to Appropriate Level of Care:12311} for {GREATER/LESS:541672360} 30 days.     Update Admission H&P: {CHP DME Changes in HandP:[id number]}    PHYSICIAN SIGNATURE:  {Esignature:[id number]}

## 2024-09-03 NOTE — PLAN OF CARE
Problem: Discharge Planning  Goal: Discharge to home or other facility with appropriate resources  9/3/2024 1701 by Kamran Jerez RN  Outcome: Completed  9/3/2024 0932 by Kamran Jerez RN  Outcome: Progressing     Problem: Pain  Goal: Verbalizes/displays adequate comfort level or baseline comfort level  9/3/2024 1701 by Kamran Jerez RN  Outcome: Completed  9/3/2024 0932 by Kamran Jerez RN  Outcome: Progressing     Problem: Safety - Adult  Goal: Free from fall injury  9/3/2024 1701 by Kamran Jerez RN  Outcome: Completed  9/3/2024 0932 by Kamran Jerez RN  Outcome: Progressing     Problem: ABCDS Injury Assessment  Goal: Absence of physical injury  9/3/2024 1701 by Kamran Jerez RN  Outcome: Completed  9/3/2024 0932 by Kamran Jerez RN  Outcome: Progressing     Problem: Skin/Tissue Integrity  Goal: Absence of new skin breakdown  Description: 1.  Monitor for areas of redness and/or skin breakdown  2.  Assess vascular access sites hourly  3.  Every 4-6 hours minimum:  Change oxygen saturation probe site  4.  Every 4-6 hours:  If on nasal continuous positive airway pressure, respiratory therapy assess nares and determine need for appliance change or resting period.  9/3/2024 1701 by Kamran Jerez RN  Outcome: Completed  9/3/2024 0932 by Kamran Jerez RN  Outcome: Progressing     Problem: Nutrition Deficit:  Goal: Optimize nutritional status  9/3/2024 1701 by Kamran Jerez RN  Outcome: Completed  9/3/2024 0932 by Kamran Jerez RN  Outcome: Progressing

## 2024-09-03 NOTE — PROGRESS NOTES
Ortho resident messaged at 11:26 and 13:27 about orthotic orders clarification with no response as of yet

## 2024-09-05 ENCOUNTER — APPOINTMENT (OUTPATIENT)
Dept: GENERAL RADIOLOGY | Age: 34
End: 2024-09-05
Payer: COMMERCIAL

## 2024-09-05 ENCOUNTER — HOSPITAL ENCOUNTER (EMERGENCY)
Age: 34
Discharge: HOME | End: 2024-09-05
Attending: STUDENT IN AN ORGANIZED HEALTH CARE EDUCATION/TRAINING PROGRAM
Payer: COMMERCIAL

## 2024-09-05 ENCOUNTER — TELEPHONE (OUTPATIENT)
Dept: SURGERY | Age: 34
End: 2024-09-05

## 2024-09-05 VITALS
SYSTOLIC BLOOD PRESSURE: 169 MMHG | DIASTOLIC BLOOD PRESSURE: 106 MMHG | HEART RATE: 82 BPM | TEMPERATURE: 98.3 F | RESPIRATION RATE: 16 BRPM | WEIGHT: 310 LBS | OXYGEN SATURATION: 99 % | HEIGHT: 74 IN | BODY MASS INDEX: 39.78 KG/M2

## 2024-09-05 DIAGNOSIS — R50.9 FEVER, UNSPECIFIED FEVER CAUSE: Primary | ICD-10-CM

## 2024-09-05 LAB
ALBUMIN SERPL-MCNC: 3.3 G/DL (ref 3.5–5.2)
ALP SERPL-CCNC: 126 U/L (ref 40–129)
ALT SERPL-CCNC: 49 U/L (ref 0–40)
ANION GAP SERPL CALCULATED.3IONS-SCNC: 13 MMOL/L (ref 7–16)
AST SERPL-CCNC: 26 U/L (ref 0–39)
B PARAP IS1001 DNA NPH QL NAA+NON-PROBE: NOT DETECTED
B PERT DNA SPEC QL NAA+PROBE: NOT DETECTED
BASOPHILS # BLD: 0.05 K/UL (ref 0–0.2)
BASOPHILS NFR BLD: 1 % (ref 0–2)
BILIRUB SERPL-MCNC: 0.7 MG/DL (ref 0–1.2)
BILIRUB UR QL STRIP: ABNORMAL
BUN SERPL-MCNC: 14 MG/DL (ref 6–20)
C PNEUM DNA NPH QL NAA+NON-PROBE: NOT DETECTED
CALCIUM SERPL-MCNC: 9 MG/DL (ref 8.6–10.2)
CHLORIDE SERPL-SCNC: 103 MMOL/L (ref 98–107)
CLARITY UR: CLEAR
CO2 SERPL-SCNC: 22 MMOL/L (ref 22–29)
COLOR UR: YELLOW
COMMENT: ABNORMAL
CREAT SERPL-MCNC: 0.8 MG/DL (ref 0.7–1.2)
EOSINOPHIL # BLD: 0.3 K/UL (ref 0.05–0.5)
EOSINOPHILS RELATIVE PERCENT: 4 % (ref 0–6)
ERYTHROCYTE [DISTWIDTH] IN BLOOD BY AUTOMATED COUNT: 13 % (ref 11.5–15)
FLUAV RNA NPH QL NAA+NON-PROBE: NOT DETECTED
FLUBV RNA NPH QL NAA+NON-PROBE: NOT DETECTED
GFR, ESTIMATED: >90 ML/MIN/1.73M2
GLUCOSE SERPL-MCNC: 109 MG/DL (ref 74–99)
GLUCOSE UR STRIP-MCNC: NEGATIVE MG/DL
HADV DNA NPH QL NAA+NON-PROBE: NOT DETECTED
HCOV 229E RNA NPH QL NAA+NON-PROBE: NOT DETECTED
HCOV HKU1 RNA NPH QL NAA+NON-PROBE: NOT DETECTED
HCOV NL63 RNA NPH QL NAA+NON-PROBE: NOT DETECTED
HCOV OC43 RNA NPH QL NAA+NON-PROBE: NOT DETECTED
HCT VFR BLD AUTO: 36.9 % (ref 37–54)
HGB BLD-MCNC: 12 G/DL (ref 12.5–16.5)
HGB UR QL STRIP.AUTO: NEGATIVE
HMPV RNA NPH QL NAA+NON-PROBE: NOT DETECTED
HPIV1 RNA NPH QL NAA+NON-PROBE: NOT DETECTED
HPIV2 RNA NPH QL NAA+NON-PROBE: NOT DETECTED
HPIV3 RNA NPH QL NAA+NON-PROBE: NOT DETECTED
HPIV4 RNA NPH QL NAA+NON-PROBE: NOT DETECTED
IMM GRANULOCYTES # BLD AUTO: 0.08 K/UL (ref 0–0.58)
IMM GRANULOCYTES NFR BLD: 1 % (ref 0–5)
KETONES UR STRIP-MCNC: ABNORMAL MG/DL
LACTATE BLDV-SCNC: 1.2 MMOL/L (ref 0.5–1.9)
LEUKOCYTE ESTERASE UR QL STRIP: NEGATIVE
LYMPHOCYTES NFR BLD: 2.25 K/UL (ref 1.5–4)
LYMPHOCYTES RELATIVE PERCENT: 27 % (ref 20–42)
M PNEUMO DNA NPH QL NAA+NON-PROBE: NOT DETECTED
MCH RBC QN AUTO: 31.1 PG (ref 26–35)
MCHC RBC AUTO-ENTMCNC: 32.5 G/DL (ref 32–34.5)
MCV RBC AUTO: 95.6 FL (ref 80–99.9)
MONOCYTES NFR BLD: 0.66 K/UL (ref 0.1–0.95)
MONOCYTES NFR BLD: 8 % (ref 2–12)
NEUTROPHILS NFR BLD: 60 % (ref 43–80)
NEUTS SEG NFR BLD: 5.06 K/UL (ref 1.8–7.3)
NITRITE UR QL STRIP: NEGATIVE
PH UR STRIP: 6 [PH] (ref 5–9)
PLATELET # BLD AUTO: 336 K/UL (ref 130–450)
PMV BLD AUTO: 10 FL (ref 7–12)
POTASSIUM SERPL-SCNC: 4.6 MMOL/L (ref 3.5–5)
PROCALCITONIN SERPL-MCNC: 0.08 NG/ML (ref 0–0.08)
PROT SERPL-MCNC: 7.1 G/DL (ref 6.4–8.3)
PROT UR STRIP-MCNC: NEGATIVE MG/DL
RBC # BLD AUTO: 3.86 M/UL (ref 3.8–5.8)
RSV RNA NPH QL NAA+NON-PROBE: NOT DETECTED
RV+EV RNA NPH QL NAA+NON-PROBE: NOT DETECTED
SARS-COV-2 RNA NPH QL NAA+NON-PROBE: NOT DETECTED
SODIUM SERPL-SCNC: 138 MMOL/L (ref 132–146)
SP GR UR STRIP: >1.03 (ref 1–1.03)
SPECIMEN DESCRIPTION: NORMAL
UROBILINOGEN UR STRIP-ACNC: 0.2 EU/DL (ref 0–1)
WBC OTHER # BLD: 8.4 K/UL (ref 4.5–11.5)

## 2024-09-05 PROCEDURE — 2580000003 HC RX 258: Performed by: STUDENT IN AN ORGANIZED HEALTH CARE EDUCATION/TRAINING PROGRAM

## 2024-09-05 PROCEDURE — 85025 COMPLETE CBC W/AUTO DIFF WBC: CPT

## 2024-09-05 PROCEDURE — 84145 PROCALCITONIN (PCT): CPT

## 2024-09-05 PROCEDURE — 81003 URINALYSIS AUTO W/O SCOPE: CPT

## 2024-09-05 PROCEDURE — 0202U NFCT DS 22 TRGT SARS-COV-2: CPT

## 2024-09-05 PROCEDURE — 83605 ASSAY OF LACTIC ACID: CPT

## 2024-09-05 PROCEDURE — 80053 COMPREHEN METABOLIC PANEL: CPT

## 2024-09-05 PROCEDURE — 71045 X-RAY EXAM CHEST 1 VIEW: CPT

## 2024-09-05 PROCEDURE — 99284 EMERGENCY DEPT VISIT MOD MDM: CPT

## 2024-09-05 RX ORDER — 0.9 % SODIUM CHLORIDE 0.9 %
1000 INTRAVENOUS SOLUTION INTRAVENOUS ONCE
Status: COMPLETED | OUTPATIENT
Start: 2024-09-05 | End: 2024-09-05

## 2024-09-05 RX ADMIN — SODIUM CHLORIDE 1000 ML: 9 INJECTION, SOLUTION INTRAVENOUS at 18:31

## 2024-09-05 ASSESSMENT — PAIN DESCRIPTION - PAIN TYPE: TYPE: ACUTE PAIN

## 2024-09-05 ASSESSMENT — ENCOUNTER SYMPTOMS
NAUSEA: 0
COUGH: 0
BACK PAIN: 0
SORE THROAT: 0
SHORTNESS OF BREATH: 0
PHOTOPHOBIA: 0
DIARRHEA: 0
ABDOMINAL PAIN: 0

## 2024-09-05 ASSESSMENT — PAIN DESCRIPTION - ORIENTATION: ORIENTATION: LEFT

## 2024-09-05 ASSESSMENT — PAIN DESCRIPTION - DESCRIPTORS: DESCRIPTORS: THROBBING

## 2024-09-05 ASSESSMENT — PAIN DESCRIPTION - LOCATION: LOCATION: ARM

## 2024-09-05 ASSESSMENT — PAIN SCALES - GENERAL: PAINLEVEL_OUTOF10: 7

## 2024-09-05 ASSESSMENT — LIFESTYLE VARIABLES: HOW OFTEN DO YOU HAVE A DRINK CONTAINING ALCOHOL: MONTHLY OR LESS

## 2024-09-05 ASSESSMENT — PAIN - FUNCTIONAL ASSESSMENT: PAIN_FUNCTIONAL_ASSESSMENT: 0-10

## 2024-09-05 NOTE — TELEPHONE ENCOUNTER
First attempt, Left message to schedule pt with trauma clinic for follow up, awaiting return call from pt .  Electronically signed by Vesta Kothari on 9/5/2024 at 1:17 PM

## 2024-09-05 NOTE — ED PROVIDER NOTES
[facility] EMERGENCY DEPARTMENT  EMERGENCY DEPARTMENT ENCOUNTER        Pt Name: Cheikh Freedman  MRN: [id number]  Birthdate 1990  Date of evaluation: 9/5/2024  Provider: Radha Earl MD  PCP: Zach Pulliam MD  Note Started: 6:05 PM EDT 9/5/24    HPI  34 y.o. male presenting for fever.  Patient recently had surgery for broken arms bilaterally.  Today, he had fever up to 102.8F.  He has been taking ibuprofen at home.  He denies new chest pain, shortness of breath, cough, congestion, nausea, or other complaints at this time.    --------------------------------------------- PAST HISTORY ---------------------------------------------  Past Medical History:  has a past medical history of Asthma.    Past Surgical History:  has a past surgical history that includes Forearm surgery (Bilateral, 8/23/2024); Elbow surgery (Bilateral, 8/27/2024); Forearm surgery (Left, 8/29/2024); and Forearm surgery (Right, 8/29/2024).    Social History:  reports that he has never smoked. He has never used smokeless tobacco. He reports that he does not drink alcohol and does not use drugs.    Family History: family history is not on file.     The patient’s home medications have been reviewed.    Allergies: Patient has no known allergies.      Review of Systems   Constitutional:  Positive for fever. Negative for chills.   HENT:  Negative for congestion and sore throat.    Eyes:  Negative for photophobia and visual disturbance.   Respiratory:  Negative for cough and shortness of breath.    Cardiovascular:  Negative for chest pain and leg swelling.   Gastrointestinal:  Negative for abdominal pain, diarrhea and nausea.   Genitourinary:  Negative for dysuria and flank pain.   Musculoskeletal:  Negative for back pain and myalgias.   Skin:  Negative for rash and wound.   Neurological:  Negative for dizziness, light-headedness and headaches.        Physical Exam  Constitutional:       General: He is not  for discharge with outpatient follow-up.  Patient discharged home with strict return precautions.    Amount and/or Complexity of Data Reviewed  External Data Reviewed: notes.     Details: Op note by Dr. Lucero, orthopedic surgery, 8/29/2024-patient underwent left wrist external fixator removal, left distal radius open reduction internal fixation, right distal radius open reduction internal fixation, right olecranon open reduction internal fixation  Labs: ordered. Decision-making details documented in ED Course.     Details: All labs interpreted by me  Radiology: ordered and independent interpretation performed.     Details: Chest x-ray-no focal consolidation or pneumothorax  All imaging reviewed by me, final interpretation per radiologist      Risk  Prescription drug management.        Is this patient to be included in the SEP-1 core measure? No Exclusion criteria - the patient is NOT to be included for SEP-1 Core Measure due to: 2+ SIRS criteria are not met      ED Course as of 09/05/24 2025   Thu Sep 05, 2024 2022 CBC with Auto Differential(!):    WBC 8.4   RBC 3.86   Hemoglobin Quant 12.0(!)   Hematocrit 36.9(!)   MCV 95.6   MCH 31.1   MCHC 32.5   RDW 13.0   Platelet Count 336   MPV 10.0   Neutrophils % 60   Lymphocyte % 27   Monocytes % 8   Eosinophils % 4   Basophils % 1   Immature Granulocytes % 1   Neutrophils Absolute 5.06   Lymphocytes Absolute 2.25   Monocytes Absolute 0.66   Eosinophils Absolute 0.30   Basophils Absolute 0.05   Immature Granulocytes Absolute 0.08  No leukocytosis [AP]   2022 CMP w/ Reflex to MG(!):    Sodium 138   Potassium 4.6   Chloride 103   CARBON DIOXIDE 22   Anion Gap 13   Glucose 109(!)   BUN,BUNPL 14   Creatinine 0.8   Est, Glom Filt Rate >90   Calcium 9.0   Total Protein 7.1   Albumin 3.3(!)   Total Bilirubin 0.7   Alkaline Phosphatase 126   ALT 49(!)   AST 26  Electrolytes within normal limits [AP]   2022 Procalcitonin:    Procalcitonin 0.08  Normal [AP]   2022 Respiratory

## 2024-09-05 NOTE — ED TRIAGE NOTES
Department of Emergency Medicine  FIRST PROVIDER TRIAGE NOTE             Independent MLP           9/5/24  5:41 PM EDT    Date of Encounter: 9/5/24   MRN: [id number]      HPI: Cheikh Freedman is a 34 y.o. male who presents to the ED for Fever (Surgery last Friday/ bilateral arm fractures.)       ROS: Negative for cp, sob, Suicidal ideation, or Homicidal Ideation.    PE: Gen Appearance/Constitutional: alert  CV: regular rate  Pulm: CTA bilat     Initial Plan of Care: All treatment areas with department are currently occupied. Plan to order/Initiate the following while awaiting opening in ED: Proceed toTreatment Area When Bed Available for ED Attending/MLP to Continue Care    Electronically signed by LYNN Schreiber CNP   DD: 9/5/24

## 2024-09-17 ENCOUNTER — OFFICE VISIT (OUTPATIENT)
Dept: SURGERY | Age: 34
End: 2024-09-17
Payer: COMMERCIAL

## 2024-09-17 VITALS
TEMPERATURE: 97.9 F | BODY MASS INDEX: 37.86 KG/M2 | RESPIRATION RATE: 14 BRPM | HEART RATE: 72 BPM | DIASTOLIC BLOOD PRESSURE: 105 MMHG | WEIGHT: 295 LBS | OXYGEN SATURATION: 97 % | SYSTOLIC BLOOD PRESSURE: 169 MMHG | HEIGHT: 74 IN
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
## 2024-09-17 PROCEDURE — 99212 OFFICE O/P EST SF 10 MIN: CPT

## 2024-09-17 RX ORDER — MECLIZINE HYDROCHLORIDE 25 MG/1
25 TABLET ORAL 3 TIMES DAILY PRN
Qty: 15 TABLET | Refills: 1 | Status: SHIPPED | OUTPATIENT
Start: 2024-09-17 | End: 2024-10-01

## 2024-09-17 RX ORDER — ONDANSETRON 4 MG/1
4 TABLET, ORALLY DISINTEGRATING ORAL 3 TIMES DAILY PRN
Qty: 21 TABLET | Refills: 0 | Status: SHIPPED | OUTPATIENT
Start: 2024-09-17

## 2024-09-17 RX ORDER — METHOCARBAMOL 750 MG/1
750 TABLET, FILM COATED ORAL 4 TIMES DAILY
COMMUNITY

## 2024-11-04 NOTE — PROGRESS NOTES
Elbow Extension/Flexion: 0/145* -12*/125*       Forearm Pronation: 0/80-90* WNL      Supination: 0/80-90* 45*       Wrist Flexion: 0/60-80* 65*      Extension: 0/60-70* 52*      Radial Deviation: 0/20-25* 30*      Ulnar Deviation: 0/30-45* 25*       L UE ROM  -  KEY: Ext/Flex     AROM(PROM)     Forearm Pronation: 0/80-90* WNL      Supination: 0/80-90* 55*       Wrist Flexion: 0/60-80* DNT (plate present)      Extension: 0/60-70* DNT      Radial Deviation: 0/20-25* DNT      Ulnar Deviation: 0/30-45* DNT         L Hand ROM  Pt demonstrates full composite flexion of all digits excluding IF  IF to DPC: 2.6 cm    MMT  R elbow flex: 4/5  R elbow ext: 4-/5         Dynamometer (setting 2) IE       Left 25#        Right 60#        Pinch (lateral)         Left  18#       Right  29#       Pinch (tripod)         Left  11#       Right  18#          9 Hole Peg test  IE       Left 34 sec        Right 25 sec           QuickDASH  IE       Disability   70.5%          Intervention:   Education- Edu on dx, prognosis, plan of care, and goals. Verbalizes understanding of all education. From previous OT sessions at [facility], pt has Theraputty program, T band stretches for R elbow, and ROM exercises for wrists and elbow.     Manual techniques- to R elbow and forearm to maximize soft tissue extensibility and ROM    Moist Heat- to R elbow in prep for manual techniques    Ther Ex: Elbow flexion and extension, added to HEP to be completed supine with AA to be completed 15, 5-6x/day.        Eval Complexity: Low  Profile and History- Chart reviewed  Assessment of Occupational Performance and Identification of Deficits- 11   Clinical Decision Making- no additional modifications required    Rehab Potential:                                 [x] Good  [] Fair  [] Poor        Suggested Professional Referral:       [x] No  [] Yes:  Barriers to Goal Achievement::          [x] No  [] Yes:  Domestic Concerns:                           [x] No  []

## 2024-11-05 ENCOUNTER — HOSPITAL ENCOUNTER (OUTPATIENT)
Dept: OCCUPATIONAL THERAPY | Age: 34
Setting detail: THERAPIES SERIES
Discharge: HOME | End: 2024-11-05
Payer: COMMERCIAL

## 2024-11-05 PROCEDURE — 97140 MANUAL THERAPY 1/> REGIONS: CPT

## 2024-11-05 PROCEDURE — 97165 OT EVAL LOW COMPLEX 30 MIN: CPT

## 2024-11-13 ENCOUNTER — HOSPITAL ENCOUNTER (OUTPATIENT)
Dept: OCCUPATIONAL THERAPY | Age: 34
Setting detail: THERAPIES SERIES
Discharge: HOME | End: 2024-11-13
Payer: COMMERCIAL

## 2024-11-13 PROCEDURE — 97140 MANUAL THERAPY 1/> REGIONS: CPT | Performed by: OCCUPATIONAL THERAPIST

## 2024-11-13 PROCEDURE — 97018 PARAFFIN BATH THERAPY: CPT | Performed by: OCCUPATIONAL THERAPIST

## 2024-11-13 PROCEDURE — 97110 THERAPEUTIC EXERCISES: CPT | Performed by: OCCUPATIONAL THERAPIST

## 2024-11-13 NOTE — PROGRESS NOTES
Time Out: 1705                        CODE  Minutes  Units   75631 Fluidotherapy     04009 Manual 15 1   06571 Therapeutic Ex 35 2   94992 Therapeutic Activity     02842 ADL/COMP Tech Train     24705 Neuromuscular Re-Ed     46449 Ortho Management Training     94871 Paraffin 10 1   38516 Electrical Stim - Attended     57687 Iontophoresis     18032 Ultrasound      Other       60 4     Plan: OT 1-2x/week for 18 sessions    [x]  Continues Plan of care with focus on AROM/AAROM/PROM of affected joints, strength, edema, scar mobilization, pain, and endurance for safe return to work and prior level of functioning: Treatment covered based on POC and graduated to patient's progress. Pt education continues at each visit to obtain maximum benefits from skilled OT intervention.  []  Alter Plan of care:   []  Discharge:    CIERRA Muñiz/L, MS #[id number]

## 2024-11-14 ENCOUNTER — HOSPITAL ENCOUNTER (OUTPATIENT)
Dept: OCCUPATIONAL THERAPY | Age: 34
Setting detail: THERAPIES SERIES
Discharge: HOME | End: 2024-11-14
Payer: COMMERCIAL

## 2024-11-14 PROCEDURE — 97018 PARAFFIN BATH THERAPY: CPT | Performed by: OCCUPATIONAL THERAPIST

## 2024-11-14 PROCEDURE — 97140 MANUAL THERAPY 1/> REGIONS: CPT | Performed by: OCCUPATIONAL THERAPIST

## 2024-11-14 PROCEDURE — 97110 THERAPEUTIC EXERCISES: CPT | Performed by: OCCUPATIONAL THERAPIST

## 2024-11-14 NOTE — PROGRESS NOTES
given.      Time In: 1505            Time Out: 1600                        CODE  Minutes  Units   46384 Fluidotherapy     12317 Manual 15 1   01757 Therapeutic Ex 30 2   54152 Therapeutic Activity     44046 ADL/COMP Tech Train     25326 Neuromuscular Re-Ed     07099 Ortho Management Training     12604 Paraffin 10 1   05599 Electrical Stim - Attended     34851 Iontophoresis     31937 Ultrasound      Other       55 4     Plan: OT 1-2x/week for 18 sessions    [x]  Continues Plan of care with focus on AROM/AAROM/PROM of affected joints, strength, edema, scar mobilization, pain, and endurance for safe return to work and prior level of functioning: Treatment covered based on POC and graduated to patient's progress. Pt education continues at each visit to obtain maximum benefits from skilled OT intervention.  []  Alter Plan of care:   []  Discharge:    CIERRA Muñiz/L, MS #[id number]

## 2024-11-20 ENCOUNTER — HOSPITAL ENCOUNTER (OUTPATIENT)
Dept: OCCUPATIONAL THERAPY | Age: 34
Setting detail: THERAPIES SERIES
Discharge: HOME | End: 2024-11-20
Payer: COMMERCIAL

## 2024-11-20 PROCEDURE — 97140 MANUAL THERAPY 1/> REGIONS: CPT | Performed by: OCCUPATIONAL THERAPIST

## 2024-11-20 PROCEDURE — 97018 PARAFFIN BATH THERAPY: CPT | Performed by: OCCUPATIONAL THERAPIST

## 2024-11-20 PROCEDURE — 97110 THERAPEUTIC EXERCISES: CPT | Performed by: OCCUPATIONAL THERAPIST

## 2024-11-20 NOTE — PROGRESS NOTES
OCCUPATIONAL THERAPY DAILY TREATMENT NOTE  [facility]  [facility]  [address] [address]  Dept: [phone]  Loc: [phone]   [facility] OT Fax: [phone]    Date:  2024   Initial Evaluation Date: 24                                Evaluating Therapist: Akua Dean OT     Patient Name:  Cheikh Freedman                  :  1990     Restrictions/Precautions:  Follow bilateral distal radius ORIF protocol, follow R olecranon ORIF protocol, bilat conservative distal ulna fx protocol, WB BUEs up to 20#  Diagnosis:  L Distal Radius ORIF (Dorsal Spanning Plate), R Distal Radius ORIF, & R Olecranon ORIF  S52.501A (ICD-10-CM) - Unspecified fracture of the lower end of right radius, initial encounter for closed fracture   S52.502A (ICD-10-CM) - Unspecified fracture of the lower end of left radius, initial encounter for closed fracture   S52.601A (ICD-10-CM) - Unspecified fracture of lower end of right ulna, initial encounter for closed fracture   S52.602A (ICD-10-CM) - Unspecified fracture of lower end of left ulna, initial encounter for closed fracture   S52.021A (ICD-10-CM) - Displaced fracture of olecranon process without intraarticular extension of right ulna, initial encounter for closed fracture   Date of Surgery/Injury: 24 (9 weeks post op)      Insurance/Certification information: [facility] PAYER [id number] (40 visits combined, used 14 as of today )  Plan of care signed (Y/N): N  Visit# / total visits:      Referring Practitioner: Dr. Lucero  Specific Practitioner Orders: Frequency 2-3 times per week for 6-8 weeks or per therapist discretion  PROM, AAROM, AROM, Stretching, Scar Mgmt, Strengthening, and modalities as needed      Splint needed: Left munster, right volar wrist splint     Assessment of current deficits   [x] ADLs           [x] Strength  [x] IADLs           [x] Endurance     [x] Edema          [x] Fine Motor Coordination    [x] Sensation

## 2024-11-21 ENCOUNTER — HOSPITAL ENCOUNTER (OUTPATIENT)
Dept: OCCUPATIONAL THERAPY | Age: 34
Setting detail: THERAPIES SERIES
Discharge: HOME | End: 2024-11-21
Payer: COMMERCIAL

## 2024-11-21 PROCEDURE — 97110 THERAPEUTIC EXERCISES: CPT | Performed by: OCCUPATIONAL THERAPIST

## 2024-11-21 PROCEDURE — 97018 PARAFFIN BATH THERAPY: CPT | Performed by: OCCUPATIONAL THERAPIST

## 2024-11-21 PROCEDURE — 97140 MANUAL THERAPY 1/> REGIONS: CPT | Performed by: OCCUPATIONAL THERAPIST

## 2024-11-21 NOTE — PROGRESS NOTES
OCCUPATIONAL THERAPY DAILY TREATMENT NOTE  [facility]  [facility]  [address] [address]  Dept: [phone]  Loc: [phone]   [facility] OT Fax: [phone]    Date:  2024   Initial Evaluation Date: 24                                Evaluating Therapist: Akua Dean OT     Patient Name:  Cheikh Freedman                  :  1990     Restrictions/Precautions:  Follow bilateral distal radius ORIF protocol, follow R olecranon ORIF protocol, bilat conservative distal ulna fx protocol, WB BUEs up to 20#  Diagnosis:  L Distal Radius ORIF (Dorsal Spanning Plate), R Distal Radius ORIF, & R Olecranon ORIF  S52.501A (ICD-10-CM) - Unspecified fracture of the lower end of right radius, initial encounter for closed fracture   S52.502A (ICD-10-CM) - Unspecified fracture of the lower end of left radius, initial encounter for closed fracture   S52.601A (ICD-10-CM) - Unspecified fracture of lower end of right ulna, initial encounter for closed fracture   S52.602A (ICD-10-CM) - Unspecified fracture of lower end of left ulna, initial encounter for closed fracture   S52.021A (ICD-10-CM) - Displaced fracture of olecranon process without intraarticular extension of right ulna, initial encounter for closed fracture   Date of Surgery/Injury: 24 (9 weeks post op)      Insurance/Certification information: [facility] [id number] (40 visits combined, used 14 as of today )  Plan of care signed (Y/N): N  Visit# / total visits:      Referring Practitioner: Dr. Lucero  Specific Practitioner Orders: Frequency 2-3 times per week for 6-8 weeks or per therapist discretion  PROM, AAROM, AROM, Stretching, Scar Mgmt, Strengthening, and modalities as needed      Splint needed: Left munster, right volar wrist splint     Assessment of current deficits   [x] ADLs           [x] Strength  [x] IADLs           [x] Endurance     [x] Edema          [x] Fine Motor Coordination    [x] Sensation

## 2024-11-26 ENCOUNTER — HOSPITAL ENCOUNTER (OUTPATIENT)
Dept: OCCUPATIONAL THERAPY | Age: 34
Setting detail: THERAPIES SERIES
Discharge: HOME | End: 2024-11-26
Payer: COMMERCIAL

## 2024-11-26 PROCEDURE — 97018 PARAFFIN BATH THERAPY: CPT | Performed by: OCCUPATIONAL THERAPIST

## 2024-11-26 PROCEDURE — 97140 MANUAL THERAPY 1/> REGIONS: CPT | Performed by: OCCUPATIONAL THERAPIST

## 2024-11-26 PROCEDURE — 97110 THERAPEUTIC EXERCISES: CPT | Performed by: OCCUPATIONAL THERAPIST

## 2024-11-26 NOTE — PROGRESS NOTES
OCCUPATIONAL THERAPY DAILY TREATMENT NOTE  [facility]  [facility]  [address] [address]  Dept: [phone]  Loc: [phone]   [facility] OT Fax: [phone]    Date:  2024   Initial Evaluation Date: 24                                Evaluating Therapist: Akua Dean OT     Patient Name:  Cheikh Freedman                  :  1990     Restrictions/Precautions:  Follow bilateral distal radius ORIF protocol, follow R olecranon ORIF protocol, bilat conservative distal ulna fx protocol, WB BUEs up to 20#  Diagnosis:  L Distal Radius ORIF (Dorsal Spanning Plate), R Distal Radius ORIF, & R Olecranon ORIF  S52.501A (ICD-10-CM) - Unspecified fracture of the lower end of right radius, initial encounter for closed fracture   S52.502A (ICD-10-CM) - Unspecified fracture of the lower end of left radius, initial encounter for closed fracture   S52.601A (ICD-10-CM) - Unspecified fracture of lower end of right ulna, initial encounter for closed fracture   S52.602A (ICD-10-CM) - Unspecified fracture of lower end of left ulna, initial encounter for closed fracture   S52.021A (ICD-10-CM) - Displaced fracture of olecranon process without intraarticular extension of right ulna, initial encounter for closed fracture   Date of Surgery/Injury: 24 (9 weeks post op)      Insurance/Certification information: [facility] PAYER [id number] (40 visits combined, used 14 as of today )  Plan of care signed (Y/N): N  Visit# / total visits:      Referring Practitioner: Dr. Lucero  Specific Practitioner Orders: Frequency 2-3 times per week for 6-8 weeks or per therapist discretion  PROM, AAROM, AROM, Stretching, Scar Mgmt, Strengthening, and modalities as needed      Splint needed: Left munster, right volar wrist splint     Assessment of current deficits   [x] ADLs           [x] Strength  [x] IADLs           [x] Endurance     [x] Edema          [x] Fine Motor Coordination    [x] Sensation

## 2024-12-03 ENCOUNTER — HOSPITAL ENCOUNTER (OUTPATIENT)
Dept: OCCUPATIONAL THERAPY | Age: 34
Setting detail: THERAPIES SERIES
Discharge: HOME | End: 2024-12-03
Payer: COMMERCIAL

## 2024-12-04 ENCOUNTER — OFFICE VISIT (OUTPATIENT)
Dept: ORTHOPEDIC SURGERY | Age: 34
End: 2024-12-04
Payer: COMMERCIAL

## 2024-12-04 ENCOUNTER — PREP FOR PROCEDURE (OUTPATIENT)
Dept: ORTHOPEDIC SURGERY | Age: 34
End: 2024-12-04

## 2024-12-04 ENCOUNTER — TELEPHONE (OUTPATIENT)
Dept: ORTHOPEDIC SURGERY | Age: 34
End: 2024-12-04

## 2024-12-04 VITALS
SYSTOLIC BLOOD PRESSURE: 130 MMHG | RESPIRATION RATE: 20 BRPM | OXYGEN SATURATION: 92 % | TEMPERATURE: 97.1 F | HEART RATE: 116 BPM | DIASTOLIC BLOOD PRESSURE: 86 MMHG

## 2024-12-04 DIAGNOSIS — T84.84XA PAINFUL ORTHOPAEDIC HARDWARE (HCC): ICD-10-CM

## 2024-12-04 DIAGNOSIS — S52.571C: ICD-10-CM

## 2024-12-04 DIAGNOSIS — G56.01 RIGHT CARPAL TUNNEL SYNDROME: ICD-10-CM

## 2024-12-04 DIAGNOSIS — S42.401A CLOSED FRACTURE OF RIGHT ELBOW, INITIAL ENCOUNTER: ICD-10-CM

## 2024-12-04 DIAGNOSIS — T84.84XA PAINFUL ORTHOPAEDIC HARDWARE (HCC): Primary | ICD-10-CM

## 2024-12-04 PROCEDURE — 99214 OFFICE O/P EST MOD 30 MIN: CPT | Performed by: STUDENT IN AN ORGANIZED HEALTH CARE EDUCATION/TRAINING PROGRAM

## 2024-12-04 RX ORDER — METHOCARBAMOL 750 MG/1
750 TABLET, FILM COATED ORAL 4 TIMES DAILY
Qty: 40 TABLET | Refills: 0 | Status: SHIPPED | OUTPATIENT
Start: 2024-12-04 | End: 2024-12-14

## 2024-12-04 RX ORDER — GABAPENTIN 300 MG/1
300 CAPSULE ORAL 2 TIMES DAILY
Qty: 60 CAPSULE | Refills: 0 | Status: SHIPPED | OUTPATIENT
Start: 2024-12-04 | End: 2025-01-03
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
## 2024-12-04 NOTE — PATIENT INSTRUCTIONS
Procedure: Left Wrist Removal of Hardware, Possible revision ORIF    You will not need medical clearance prior to surgery.     Please call your doctor to set up an appointment for medical clearance if necessary as soon as possible and have the office fax your medical clearance to : Loida Thao MA  FAX: [phone], PHONE: [phone]      Your surgery is scheduled for 12/10/2024 at 8:00am  with Dr. Giacomo Norman DO at the main [place] [address] [facility] in [place] .  You will need to report to Preop area  that morning at 6:00am .   All surgery start times are subject to change. You will be notified by office and/or PAT department if your surgery time changes. If you need to cancel/reschedule your surgery for any reason, please contact [facility] at [phone] ASA.   You are having Outpatient surgery, but plan for 1-2 nights in the hospital for recovery if needed.  Preadmission Testing (PAT) department at [facility] will contact you with further details regarding pre-operative blood work, where to park and enter the hospital, your medication list, etc. If you have any surgery related questions please contact PAT at [facility] at [phone].  Nothing to eat or drink after midnight the night before surgery.  You may take a pain pill and any other medicine PAT instructs you to take with small sip of water if needed.  Continue with ice and elevation to reduce swelling  No weight bearing left upper extremity, use assistive devices if needed (wheelchair, walker, crutches, cane, etc).  If you are taking Aspirin or Lovenox, hold the day of surgery. If taking Eliquis, hold this 48 hours prior to surgery.   Hold all NSAIDs (non-steroidal anti-inflammatories like Advil,Mobic/Meloxicam, motrin, ibuprofen, etc) and all Over the counter vitamins, minerals and herbal supplements for 7 days prior to surgery.- Last Dose 12/4/24    Call office with any question or concerns: [phone]    All

## 2024-12-04 NOTE — PROGRESS NOTES
[facility]   PRE-ADMISSION TESTING GENERAL INSTRUCTIONS  PAT Phone Number: [phone]      GENERAL INSTRUCTIONS:    [x] Antibacterial Soap Shower Night before AND the Morning of procedure.  [] The Night Before Surgery: Take an antibacterial soap shower - followed by CHG Wipes.   -The Morning of Surgery: Repeat CHG Wipes.  [x] Do not wear makeup, lotions, powders, deodorant the morning of surgery.  [x] No solid food after midnight. You may have SIPS of clear liquids up until 2 hours before your arrival time to the hospital.    You may brush your teeth, gargle, but do not swallow water.   [x] No tobacco products, illegal drugs, or alcohol within 24 hours of your surgery.  [x] Jewelry or valuables should not be brought to the hospital. All body and/or tongue piercing's must be removed prior to arriving to hospital. No contact lens or hair pins.   [x] Arrange transportation with a responsible adult  to and from the hospital. Arrange for someone to be with you for the remainder of the day and for 24 hours after your procedure due to having had anesthesia.          -Who will be your  for transportation? Wife - Ria        -Who will be staying with you for 24 hrs after your procedure? Wife - Ria  [x] Bring insurance card and photo ID.  [] Bring copy of living will or healthcare power of  papers to be placed in your electronic record.  [] Urine Pregnancy test will be preformed the day of surgery. A specimen sample may be brought from home.  [] Transfusion (Green) Bracelet: Please bring with you to hospital, day of surgery.     PARKING INSTRUCTIONS:     [x] ARRIVAL DATE & TIME: 12/10/24 at 0600 am  [x] Times are subject to change. We will contact you the business day before surgery if that were to occur.  [x] Enter into the [facility] Entrance. Two people may accompany you. Masks are not required.  [x] Parking Lot \"I\" is where you will park. It is located on the

## 2024-12-04 NOTE — TELEPHONE ENCOUNTER
Prior Authorization    Procedure:  SIMON left wrist, possible revision ORIF  Procedure Code: 52675, 62554  Diagnosis Code:  T84.84xa  Date:  12/10/24  [place]  [facility]  [place]: [place]  Physician:  Giacomo Villegas Reference Number:  [id number]  Auth Number: Not Required    Contact: Yesenia Sullivan [phone]

## 2024-12-05 ENCOUNTER — HOSPITAL ENCOUNTER (OUTPATIENT)
Dept: OCCUPATIONAL THERAPY | Age: 34
Setting detail: THERAPIES SERIES
Discharge: HOME | End: 2024-12-05
Payer: COMMERCIAL

## 2024-12-05 PROCEDURE — 97140 MANUAL THERAPY 1/> REGIONS: CPT | Performed by: OCCUPATIONAL THERAPIST

## 2024-12-05 PROCEDURE — 97110 THERAPEUTIC EXERCISES: CPT | Performed by: OCCUPATIONAL THERAPIST

## 2024-12-05 PROCEDURE — 97018 PARAFFIN BATH THERAPY: CPT | Performed by: OCCUPATIONAL THERAPIST

## 2024-12-05 NOTE — PROGRESS NOTES
resistant putty   Other:     Skilled Judgement & Intervention X  Provided in selection of appropriate interventions w/ pt education in proper ex tech and purpose for ex's, correct performance of therapeutic ex was facilitated w/ verbal and visual cueing.   R Elbow Pain Modulation X          X  -KT applied over bicep insertion vertically with bicep on stretch and 25% tension in tape. 2 space correctors applied horizontally with 100% tension one proximal to elbow creased and one distal. Educated on wear and care.  -Issued size G compression sleeve for the elbow.     Assessment/Comments: Session focused on pain management only today as pain has flared up directly over the bicep insertion s/p getting sick recently. Pt reports very good results after his last session in regard to pain management to that area. Goal is to address this pain and settle it before engaging in strengthening exercises again. Pt had follow-up with Dr. Norman, overall very pleased with progression of the R UE. Plans are for sx for bridge hardware removal of the L wrist with possible revision with plate if wrist remains instable. Cx'd appt for 12/10 d/t sx - will plan to see in clinic 12/12 for continued treatment of the R UE only.     -Rehab Potential: Good   -Response to Treatment: Reported pinching sensation over the bicep was gone by the end of the session.     Patient. Education:  [x] Plans/Goals, Risks/Benefits discussed  [x] Home exercise program  Method of Education: [x] Verbal  [x] Demo  [] Written  Comprehension of Education:  [x] Verbalizes understanding.  [x] Demonstrates understanding.  [] Needs Review.  [] Demonstrates/verbalizes understanding of HEP/Ed previously given.      Time In: 1005            Time Out: 1100                     CODE  Minutes  Units   53009 Fluidotherapy     24096 Manual 35 2   21733 Therapeutic Ex 10 1   83599 Therapeutic Activity     42874 ADL/COMP Tech Train     86249 Neuromuscular Re-Ed     61523 Ortho

## 2024-12-10 ENCOUNTER — APPOINTMENT (OUTPATIENT)
Dept: GENERAL RADIOLOGY | Age: 34
End: 2024-12-10
Attending: STUDENT IN AN ORGANIZED HEALTH CARE EDUCATION/TRAINING PROGRAM
Payer: COMMERCIAL

## 2024-12-10 ENCOUNTER — HOSPITAL ENCOUNTER (OUTPATIENT)
Age: 34
Setting detail: OUTPATIENT SURGERY
Discharge: HOME | End: 2024-12-10
Attending: STUDENT IN AN ORGANIZED HEALTH CARE EDUCATION/TRAINING PROGRAM | Admitting: STUDENT IN AN ORGANIZED HEALTH CARE EDUCATION/TRAINING PROGRAM
Payer: COMMERCIAL

## 2024-12-10 ENCOUNTER — ANESTHESIA EVENT (OUTPATIENT)
Dept: OPERATING ROOM | Age: 34
End: 2024-12-10
Payer: COMMERCIAL

## 2024-12-10 ENCOUNTER — ANESTHESIA (OUTPATIENT)
Dept: OPERATING ROOM | Age: 34
End: 2024-12-10
Payer: COMMERCIAL

## 2024-12-10 ENCOUNTER — APPOINTMENT (OUTPATIENT)
Dept: OCCUPATIONAL THERAPY | Age: 34
End: 2024-12-10
Payer: COMMERCIAL

## 2024-12-10 VITALS
HEART RATE: 91 BPM | DIASTOLIC BLOOD PRESSURE: 76 MMHG | HEIGHT: 74 IN | RESPIRATION RATE: 16 BRPM | BODY MASS INDEX: 39.4 KG/M2 | SYSTOLIC BLOOD PRESSURE: 111 MMHG | WEIGHT: 307 LBS | OXYGEN SATURATION: 93 % | TEMPERATURE: 97.8 F

## 2024-12-10 DIAGNOSIS — T84.84XA PAINFUL ORTHOPAEDIC HARDWARE (HCC): Primary | ICD-10-CM

## 2024-12-10 PROCEDURE — 7100000001 HC PACU RECOVERY - ADDTL 15 MIN: Performed by: STUDENT IN AN ORGANIZED HEALTH CARE EDUCATION/TRAINING PROGRAM

## 2024-12-10 PROCEDURE — 6360000002 HC RX W HCPCS

## 2024-12-10 PROCEDURE — 64415 NJX AA&/STRD BRCH PLXS IMG: CPT | Performed by: ANESTHESIOLOGY

## 2024-12-10 PROCEDURE — 7100000011 HC PHASE II RECOVERY - ADDTL 15 MIN: Performed by: STUDENT IN AN ORGANIZED HEALTH CARE EDUCATION/TRAINING PROGRAM

## 2024-12-10 PROCEDURE — 2580000003 HC RX 258: Performed by: STUDENT IN AN ORGANIZED HEALTH CARE EDUCATION/TRAINING PROGRAM

## 2024-12-10 PROCEDURE — 2709999900 HC NON-CHARGEABLE SUPPLY: Performed by: STUDENT IN AN ORGANIZED HEALTH CARE EDUCATION/TRAINING PROGRAM

## 2024-12-10 PROCEDURE — 6360000002 HC RX W HCPCS: Performed by: STUDENT IN AN ORGANIZED HEALTH CARE EDUCATION/TRAINING PROGRAM

## 2024-12-10 PROCEDURE — 3700000001 HC ADD 15 MINUTES (ANESTHESIA): Performed by: STUDENT IN AN ORGANIZED HEALTH CARE EDUCATION/TRAINING PROGRAM

## 2024-12-10 PROCEDURE — 3600000006 HC SURGERY LEVEL 6 BASE: Performed by: STUDENT IN AN ORGANIZED HEALTH CARE EDUCATION/TRAINING PROGRAM

## 2024-12-10 PROCEDURE — 3600000016 HC SURGERY LEVEL 6 ADDTL 15MIN: Performed by: STUDENT IN AN ORGANIZED HEALTH CARE EDUCATION/TRAINING PROGRAM

## 2024-12-10 PROCEDURE — 6360000002 HC RX W HCPCS: Performed by: ANESTHESIOLOGY

## 2024-12-10 PROCEDURE — 3700000000 HC ANESTHESIA ATTENDED CARE: Performed by: STUDENT IN AN ORGANIZED HEALTH CARE EDUCATION/TRAINING PROGRAM

## 2024-12-10 PROCEDURE — 7100000010 HC PHASE II RECOVERY - FIRST 15 MIN: Performed by: STUDENT IN AN ORGANIZED HEALTH CARE EDUCATION/TRAINING PROGRAM

## 2024-12-10 PROCEDURE — 7100000000 HC PACU RECOVERY - FIRST 15 MIN: Performed by: STUDENT IN AN ORGANIZED HEALTH CARE EDUCATION/TRAINING PROGRAM

## 2024-12-10 RX ORDER — DEXAMETHASONE SODIUM PHOSPHATE 10 MG/ML
4 INJECTION, SOLUTION INTRAMUSCULAR; INTRAVENOUS ONCE
Status: DISCONTINUED | OUTPATIENT
Start: 2024-12-10 | End: 2024-12-10 | Stop reason: HOSPADM

## 2024-12-10 RX ORDER — ONDANSETRON 2 MG/ML
INJECTION INTRAMUSCULAR; INTRAVENOUS
Status: DISCONTINUED | OUTPATIENT
Start: 2024-12-10 | End: 2024-12-10 | Stop reason: SDUPTHER

## 2024-12-10 RX ORDER — ROPIVACAINE HYDROCHLORIDE 5 MG/ML
INJECTION, SOLUTION EPIDURAL; INFILTRATION; PERINEURAL
Status: DISCONTINUED
Start: 2024-12-10 | End: 2024-12-10 | Stop reason: HOSPADM

## 2024-12-10 RX ORDER — DEXAMETHASONE SODIUM PHOSPHATE 4 MG/ML
INJECTION, SOLUTION INTRA-ARTICULAR; INTRALESIONAL; INTRAMUSCULAR; INTRAVENOUS; SOFT TISSUE
Status: COMPLETED | OUTPATIENT
Start: 2024-12-10 | End: 2024-12-10

## 2024-12-10 RX ORDER — CELECOXIB 100 MG/1
200 CAPSULE ORAL ONCE
Status: CANCELLED | OUTPATIENT
Start: 2024-12-10 | End: 2024-12-10

## 2024-12-10 RX ORDER — FENTANYL CITRATE 50 UG/ML
INJECTION, SOLUTION INTRAMUSCULAR; INTRAVENOUS
Status: DISCONTINUED | OUTPATIENT
Start: 2024-12-10 | End: 2024-12-10 | Stop reason: SDUPTHER

## 2024-12-10 RX ORDER — SODIUM CHLORIDE 0.9 % (FLUSH) 0.9 %
5-40 SYRINGE (ML) INJECTION PRN
Status: DISCONTINUED | OUTPATIENT
Start: 2024-12-10 | End: 2024-12-10 | Stop reason: HOSPADM

## 2024-12-10 RX ORDER — DEXAMETHASONE SODIUM PHOSPHATE 10 MG/ML
INJECTION INTRAMUSCULAR; INTRAVENOUS
Status: DISCONTINUED | OUTPATIENT
Start: 2024-12-10 | End: 2024-12-10 | Stop reason: SDUPTHER

## 2024-12-10 RX ORDER — SODIUM CHLORIDE 9 MG/ML
INJECTION, SOLUTION INTRAVENOUS PRN
Status: DISCONTINUED | OUTPATIENT
Start: 2024-12-10 | End: 2024-12-10 | Stop reason: HOSPADM

## 2024-12-10 RX ORDER — PROPOFOL 10 MG/ML
INJECTION, EMULSION INTRAVENOUS
Status: DISCONTINUED | OUTPATIENT
Start: 2024-12-10 | End: 2024-12-10 | Stop reason: SDUPTHER

## 2024-12-10 RX ORDER — MIDAZOLAM HYDROCHLORIDE 2 MG/2ML
1 INJECTION, SOLUTION INTRAMUSCULAR; INTRAVENOUS PRN
Status: DISCONTINUED | OUTPATIENT
Start: 2024-12-10 | End: 2024-12-10 | Stop reason: HOSPADM

## 2024-12-10 RX ORDER — HYDROMORPHONE HYDROCHLORIDE 1 MG/ML
0.5 INJECTION, SOLUTION INTRAMUSCULAR; INTRAVENOUS; SUBCUTANEOUS EVERY 5 MIN PRN
Status: DISCONTINUED | OUTPATIENT
Start: 2024-12-10 | End: 2024-12-10 | Stop reason: HOSPADM

## 2024-12-10 RX ORDER — OXYCODONE AND ACETAMINOPHEN 5; 325 MG/1; MG/1
1 TABLET ORAL EVERY 6 HOURS PRN
Qty: 28 TABLET | Refills: 0 | Status: SHIPPED | OUTPATIENT
Start: 2024-12-10 | End: 2024-12-17

## 2024-12-10 RX ORDER — MIDAZOLAM HYDROCHLORIDE 1 MG/ML
INJECTION, SOLUTION INTRAMUSCULAR; INTRAVENOUS
Status: COMPLETED
Start: 2024-12-10 | End: 2024-12-10

## 2024-12-10 RX ORDER — NALOXONE HYDROCHLORIDE 0.4 MG/ML
INJECTION, SOLUTION INTRAMUSCULAR; INTRAVENOUS; SUBCUTANEOUS PRN
Status: DISCONTINUED | OUTPATIENT
Start: 2024-12-10 | End: 2024-12-10 | Stop reason: HOSPADM

## 2024-12-10 RX ORDER — ROPIVACAINE HYDROCHLORIDE 5 MG/ML
INJECTION, SOLUTION EPIDURAL; INFILTRATION; PERINEURAL
Status: COMPLETED | OUTPATIENT
Start: 2024-12-10 | End: 2024-12-10

## 2024-12-10 RX ORDER — ACETAMINOPHEN 500 MG
1000 TABLET ORAL ONCE
Status: CANCELLED | OUTPATIENT
Start: 2024-12-10 | End: 2024-12-10

## 2024-12-10 RX ORDER — SODIUM CHLORIDE 0.9 % (FLUSH) 0.9 %
5-40 SYRINGE (ML) INJECTION EVERY 12 HOURS SCHEDULED
Status: DISCONTINUED | OUTPATIENT
Start: 2024-12-10 | End: 2024-12-10 | Stop reason: HOSPADM

## 2024-12-10 RX ORDER — MEPERIDINE HYDROCHLORIDE 25 MG/ML
12.5 INJECTION INTRAMUSCULAR; INTRAVENOUS; SUBCUTANEOUS
Status: DISCONTINUED | OUTPATIENT
Start: 2024-12-10 | End: 2024-12-10 | Stop reason: HOSPADM

## 2024-12-10 RX ORDER — ROPIVACAINE HYDROCHLORIDE 5 MG/ML
40 INJECTION, SOLUTION EPIDURAL; INFILTRATION; PERINEURAL ONCE
Status: DISCONTINUED | OUTPATIENT
Start: 2024-12-10 | End: 2024-12-10 | Stop reason: HOSPADM

## 2024-12-10 RX ORDER — ONDANSETRON 2 MG/ML
4 INJECTION INTRAMUSCULAR; INTRAVENOUS
Status: DISCONTINUED | OUTPATIENT
Start: 2024-12-10 | End: 2024-12-10 | Stop reason: HOSPADM

## 2024-12-10 RX ORDER — HYDROMORPHONE HYDROCHLORIDE 1 MG/ML
0.25 INJECTION, SOLUTION INTRAMUSCULAR; INTRAVENOUS; SUBCUTANEOUS EVERY 5 MIN PRN
Status: DISCONTINUED | OUTPATIENT
Start: 2024-12-10 | End: 2024-12-10 | Stop reason: HOSPADM

## 2024-12-10 RX ORDER — SODIUM CHLORIDE 9 MG/ML
INJECTION, SOLUTION INTRAVENOUS CONTINUOUS
Status: DISCONTINUED | OUTPATIENT
Start: 2024-12-10 | End: 2024-12-10 | Stop reason: HOSPADM

## 2024-12-10 RX ORDER — OXYCODONE HCL 10 MG/1
10 TABLET, FILM COATED, EXTENDED RELEASE ORAL ONCE
Status: CANCELLED | OUTPATIENT
Start: 2024-12-10 | End: 2024-12-10

## 2024-12-10 RX ADMIN — PROPOFOL 200 MG: 10 INJECTION, EMULSION INTRAVENOUS at 08:07

## 2024-12-10 RX ADMIN — FENTANYL CITRATE 25 MCG: 50 INJECTION, SOLUTION INTRAMUSCULAR; INTRAVENOUS at 09:28

## 2024-12-10 RX ADMIN — MIDAZOLAM HYDROCHLORIDE 2 MG: 2 INJECTION, SOLUTION INTRAMUSCULAR; INTRAVENOUS at 07:52

## 2024-12-10 RX ADMIN — FENTANYL CITRATE 25 MCG: 50 INJECTION, SOLUTION INTRAMUSCULAR; INTRAVENOUS at 09:15

## 2024-12-10 RX ADMIN — DEXAMETHASONE SODIUM PHOSPHATE 10 MG: 10 INJECTION INTRAMUSCULAR; INTRAVENOUS at 08:07

## 2024-12-10 RX ADMIN — MIDAZOLAM 2 MG: 1 INJECTION INTRAMUSCULAR; INTRAVENOUS at 07:52

## 2024-12-10 RX ADMIN — DEXAMETHASONE SODIUM PHOSPHATE 4 MG: 4 INJECTION, SOLUTION INTRAMUSCULAR; INTRAVENOUS at 07:54

## 2024-12-10 RX ADMIN — FENTANYL CITRATE 100 MCG: 50 INJECTION, SOLUTION INTRAMUSCULAR; INTRAVENOUS at 08:07

## 2024-12-10 RX ADMIN — ROPIVACAINE HYDROCHLORIDE 40 ML: 5 INJECTION EPIDURAL; INFILTRATION; PERINEURAL at 07:54

## 2024-12-10 RX ADMIN — SODIUM CHLORIDE: 9 INJECTION, SOLUTION INTRAVENOUS at 06:25

## 2024-12-10 RX ADMIN — ONDANSETRON 4 MG: 2 INJECTION INTRAMUSCULAR; INTRAVENOUS at 09:15

## 2024-12-10 RX ADMIN — SODIUM CHLORIDE 3000 MG: 9 INJECTION, SOLUTION INTRAVENOUS at 08:15

## 2024-12-10 ASSESSMENT — PAIN - FUNCTIONAL ASSESSMENT
PAIN_FUNCTIONAL_ASSESSMENT: NONE - DENIES PAIN
PAIN_FUNCTIONAL_ASSESSMENT: 0-10

## 2024-12-10 NOTE — ANESTHESIA PROCEDURE NOTES
Peripheral Block    Patient location during procedure: pre-op  Reason for block: post-op pain management and at surgeon's request  Start time: 12/10/2024 7:54 AM  End time: 12/10/2024 7:56 AM  Staffing  Performed: anesthesiologist   Anesthesiologist: Zach Garcia DO  Performed by: Zach Garcia DO  Authorized by: Zach Garcia DO    Preanesthetic Checklist  Completed: patient identified, IV checked, site marked, risks and benefits discussed, surgical/procedural consents, equipment checked, pre-op evaluation, timeout performed, anesthesia consent given, oxygen available and monitors applied/VS acknowledged  Peripheral Block   Patient position: sitting  Prep: ChloraPrep  Provider prep: mask and sterile gloves  Patient monitoring: cardiac monitor, continuous pulse ox, frequent blood pressure checks and IV access  Block type: Brachial plexus  Interscalene  Laterality: left  Injection technique: single-shot  Guidance: ultrasound guided  Local infiltration: lidocaine  Infiltration strength: 1 %  Local infiltration: lidocaine  Dose: 3 mL    Needle   Needle gauge: 21 G  Needle localization: ultrasound guidance  Needle length: 10 cm  Assessment   Injection assessment: negative aspiration for heme, no paresthesia on injection and local visualized surrounding nerve on ultrasound  Paresthesia pain: none  Slow fractionated injection: yes  Hemodynamics: stable  Outcomes: uncomplicated and patient tolerated procedure well    Additional Notes  U/S 81644.  Timeout performed          Medications Administered  dexAMETHasone (DECADRON) injection 4 mg/mL - Perineural   4 mg - 12/10/2024 7:54:00 AM  ropivacaine (NAROPIN) injection 0.5% - Perineural   40 mL - 12/10/2024 7:54:00 AM
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
## 2024-12-10 NOTE — ANESTHESIA POSTPROCEDURE EVALUATION
Department of Anesthesiology  Postprocedure Note    Patient: Cheikh Freedman  MRN: [id number]  YOB: 1990  Date of evaluation: 12/10/2024    Procedure Summary       Date: 12/10/24 Room / Location: [address]    Anesthesia Start: 0753 Anesthesia Stop: 0932    Procedure: LEFT WRIST HARDWARE REMOVAL, STRESS EXAMINATION UNDER ANESTHESIA (Left: Wrist) Diagnosis:       Painful orthopaedic hardware (HCC)      (Painful orthopaedic hardware (HCC) [T84.84XA])    Surgeons: Giacomo Norman DO Responsible Provider: Zach Garcia DO    Anesthesia Type: General ASA Status: 2            Anesthesia Type: General    Ada Phase I: Ada Score: 7    Ada Phase II:      Anesthesia Post Evaluation    Patient location during evaluation: PACU  Patient participation: complete - patient participated  Level of consciousness: awake and alert  Airway patency: patent  Nausea & Vomiting: no nausea and no vomiting  Cardiovascular status: blood pressure returned to baseline  Respiratory status: acceptable  Hydration status: euvolemic  Multimodal analgesia pain management approach  Pain management: adequate    No notable events documented.

## 2024-12-10 NOTE — OP NOTE
layered fashion using 2-0 Monocryl and 3-0 nylon.  Wounds were dressed with Xeroform 4 fours ABDs and web roll.  A volar resting splint was applied and the patient was awakened from anesthesia and transferred PACU stable condition.  He will discharge home nonweightbearing left upper extremity.  Encourage finger range of motion.  Follow-up in my office in 2 weeks            Giacomo Norman DO   Orthopaedic Surgery   12/10/2024  9:09 AM    Note: This report was completed using computerAmplifinity voiced recognition software.  Every effort has been made to ensure accuracy; however, inadvertent computerized transcription errors may be present.

## 2024-12-10 NOTE — DISCHARGE INSTRUCTIONS
de-escalate(wean) all patients from narcotic(opioid) medications during the post-operative period.   We provide multimodal pain control but opioid medications are tapered in all of our patients.  If patient requires referral to pain management for prolonged taper off of opioid pain medication we will facilitate this process.     Purse String (Simple) Text: Given the location of the defect and the characteristics of the surrounding skin a purse string simple closure was deemed most appropriate.  Undermining was performed circumferentially around the surgical defect.  A purse string suture was then placed and tightened.

## 2024-12-10 NOTE — PROGRESS NOTES
CLINICAL PHARMACY NOTE: MEDS TO BEDS    Total # of Prescriptions Filled: 1   The following medications were delivered to the patient:  Oxycodone/apap 5-325      Additional Documentation:   Wife (Smiley) picked up in the pharmacy

## 2024-12-10 NOTE — ANESTHESIA PRE PROCEDURE
\"POCHEMO\", \"POCHCT\" in the last 72 hours.    Coags:   Lab Results   Component Value Date/Time    PROTIME 10.4 08/23/2024 09:05 PM    INR 1.0 08/23/2024 09:05 PM    APTT 21.6 08/23/2024 09:05 PM       HCG (If Applicable): No results found for: \"PREGTESTUR\", \"PREGSERUM\", \"HCG\", \"HCGQUANT\"     ABGs: No results found for: \"PHART\", \"PO2ART\", \"DGJ5YOU\", \"BHS5OGK\", \"BEART\", \"S4HFUDIK\"     Type & Screen (If Applicable):  Lab Results   Component Value Date    ABORH O POSITIVE 08/27/2024    LABANTI NEGATIVE 08/27/2024       Drug/Infectious Status (If Applicable):  No results found for: \"HIV\", \"HEPCAB\"    COVID-19 Screening (If Applicable):   Lab Results   Component Value Date/Time    COVID19 Not Detected 09/05/2024 06:55 PM           Anesthesia Evaluation    Airway: Mallampati: II  TM distance: >3 FB   Neck ROM: full  Mouth opening: > = 3 FB  C-spine cleared Dental: normal exam         Pulmonary: breath sounds clear to auscultation  (+)           asthma:                            Cardiovascular:  Exercise tolerance: good (>4 METS)        ECG reviewed  Rhythm: regular  Rate: normal           Beta Blocker:  Not on Beta Blocker         Neuro/Psych:               GI/Hepatic/Renal:   (+) GERD:          Endo/Other:                C-spine cleared           Abdominal:             Vascular:          Other Findings:             Anesthesia Plan      general     ASA 2       Induction: intravenous.    MIPS: Postoperative opioids intended and Prophylactic antiemetics administered.  Anesthetic plan and risks discussed with patient.    Use of blood products discussed with patient whom consented to blood products.    Plan discussed with CAA and attending.                    Bg Gongora, VLAD   12/10/2024

## 2024-12-10 NOTE — BRIEF OP NOTE
Brief Postoperative Note      Patient: Cheikh Freedman  YOB: 1990  MRN: [id number]    Date of Procedure: 12/10/2024    Pre-Op Diagnosis Codes:      * Painful orthopaedic hardware (HCC) [T84.84XA]    Post-Op Diagnosis: Same       Procedure(s):  Removal of deep buried implant, left wrist dorsal spanning plate  Stress examination of left wrist under anesthesia and fluoroscopy    Surgeon(s):  Giacomo Norman DO    Assistant:  Resident: Grzegorz Main DO; Niranjan Menard DO    Anesthesia: General    Estimated Blood Loss (mL): Minimal    Complications: None    Specimens:   * No specimens in log *    Implants:  * No implants in log *      Drains: * No LDAs found *    Findings:  Infection Present At Time Of Surgery (PATOS) (choose all levels that have infection present):  No infection present  Other Findings: Removal of hardware left wrist, stress examination under anesthesia, fracture found to be stable    Electronically signed by Giacomo Norman DO on 12/10/2024 at 9:05 AM

## 2024-12-11 ENCOUNTER — APPOINTMENT (OUTPATIENT)
Dept: OCCUPATIONAL THERAPY | Age: 34
End: 2024-12-11
Payer: COMMERCIAL

## 2024-12-12 ENCOUNTER — HOSPITAL ENCOUNTER (OUTPATIENT)
Dept: OCCUPATIONAL THERAPY | Age: 34
Setting detail: THERAPIES SERIES
Discharge: HOME | End: 2024-12-12
Payer: COMMERCIAL

## 2024-12-12 PROCEDURE — 97140 MANUAL THERAPY 1/> REGIONS: CPT

## 2024-12-12 PROCEDURE — 97110 THERAPEUTIC EXERCISES: CPT

## 2024-12-12 PROCEDURE — 97018 PARAFFIN BATH THERAPY: CPT

## 2024-12-12 NOTE — PROGRESS NOTES
OCCUPATIONAL THERAPY DAILY TREATMENT NOTE  Y [facility]  [facility] OCCUPATIONAL [facility]ERAPY  [address] [facility] [address]  Dept: [phone]  Loc: [phone]   [facility] OT Fax: [phone]    Date:  2024   Initial Evaluation Date: 24                                Evaluating Therapist: Akua Dean OT     Patient Name:  Cheikh Freedman                  :  1990     Restrictions/Precautions:  Follow bilateral distal radius ORIF protocol, follow R olecranon ORIF protocol, bilat conservative distal ulna fx protocol, WB BUEs up to 20#  Diagnosis:  L Distal Radius ORIF (Dorsal Spanning Plate), R Distal Radius ORIF, & R Olecranon ORIF  S52.501A (ICD-10-CM) - Unspecified fracture of the lower end of right radius, initial encounter for closed fracture   S52.502A (ICD-10-CM) - Unspecified fracture of the lower end of left radius, initial encounter for closed fracture   S52.601A (ICD-10-CM) - Unspecified fracture of lower end of right ulna, initial encounter for closed fracture   S52.602A (ICD-10-CM) - Unspecified fracture of lower end of left ulna, initial encounter for closed fracture   S52.021A (ICD-10-CM) - Displaced fracture of olecranon process without intraarticular extension of right ulna, initial encounter for closed fracture   Date of Surgery/Injury: 24 (9 weeks post op)      Insurance/Certification information: [facility] PAYER [id number] (40 visits combined, used 14 as of today )  Plan of care signed (Y/N): Y- signed electronically  Visit# / total visits:      Referring Practitioner: Dr. Lucero  Specific Practitioner Orders: Frequency 2-3 times per week for 6-8 weeks or per therapist discretion  PROM, AAROM, AROM, Stretching, Scar Mgmt, Strengthening, and modalities as needed      Splint needed: Left munster, right volar wrist splint     Assessment of current deficits   [x] ADLs           [x] Strength  [x] IADLs           [x] Endurance     [x] Edema          [x] Fine Motor

## 2024-12-17 ENCOUNTER — HOSPITAL ENCOUNTER (OUTPATIENT)
Dept: OCCUPATIONAL THERAPY | Age: 34
Setting detail: THERAPIES SERIES
Discharge: HOME | End: 2024-12-17
Payer: COMMERCIAL

## 2024-12-17 PROCEDURE — 97032 APPL MODALITY 1+ESTIM EA 15: CPT

## 2024-12-17 PROCEDURE — 97140 MANUAL THERAPY 1/> REGIONS: CPT

## 2024-12-17 PROCEDURE — 97110 THERAPEUTIC EXERCISES: CPT

## 2024-12-17 NOTE — PROGRESS NOTES
OCCUPATIONAL THERAPY DAILY TREATMENT NOTE  Y [facility]  S[facility]LD[facility] OCCUPATIONAL THERAPY  [address] [facility] [address]  Dept: [phone]  Loc: [phone]   [facility] OT Fax: [phone]    Date:  2024   Initial Evaluation Date: 24                                Evaluating Therapist: Akua Dean OT     Patient Name:  Cheikh Freedman                  :  1990     Restrictions/Precautions:  Follow bilateral distal radius ORIF protocol, follow R olecranon ORIF protocol, bilat conservative distal ulna fx protocol, WB BUEs up to 20#  Diagnosis:  L Distal Radius ORIF (Dorsal Spanning Plate), R Distal Radius ORIF, & R Olecranon ORIF  S52.501A (ICD-10-CM) - Unspecified fracture of the lower end of right radius, initial encounter for closed fracture   S52.502A (ICD-10-CM) - Unspecified fracture of the lower end of left radius, initial encounter for closed fracture   S52.601A (ICD-10-CM) - Unspecified fracture of lower end of right ulna, initial encounter for closed fracture   S52.602A (ICD-10-CM) - Unspecified fracture of lower end of left ulna, initial encounter for closed fracture   S52.021A (ICD-10-CM) - Displaced fracture of olecranon process without intraarticular extension of right ulna, initial encounter for closed fracture   Date of Surgery/Injury: 24 (9 weeks post op)      Insurance/Certification information: [facility] PAYER [id number] (40 visits combined, used 14 as of today )  Plan of care signed (Y/N): Y- signed electronically  Visit# / total visits:      Referring Practitioner: Dr. Lucero  Specific Practitioner Orders: Frequency 2-3 times per week for 6-8 weeks or per therapist discretion  PROM, AAROM, AROM, Stretching, Scar Mgmt, Strengthening, and modalities as needed      Splint needed: Left munster, right volar wrist splint     Assessment of current deficits   [x] ADLs           [x] Strength  [x] IADLs           [x] Endurance     [x] Edema          [x] Fine Motor

## 2024-12-19 ENCOUNTER — HOSPITAL ENCOUNTER (OUTPATIENT)
Dept: OCCUPATIONAL THERAPY | Age: 34
Setting detail: THERAPIES SERIES
Discharge: HOME | End: 2024-12-19
Payer: COMMERCIAL

## 2024-12-19 PROCEDURE — 97140 MANUAL THERAPY 1/> REGIONS: CPT

## 2024-12-19 PROCEDURE — 97110 THERAPEUTIC EXERCISES: CPT

## 2024-12-19 PROCEDURE — 97018 PARAFFIN BATH THERAPY: CPT

## 2024-12-19 NOTE — PROGRESS NOTES
OCCUPATIONAL THERAPY DAILY TREATMENT NOTE  Y [facility]  S[facility]LD[facility] OCCUPATIONAL THERAPY  [address] [facility] [address]  Dept: [phone]  Loc: [phone]   [facility] OT Fax: [phone]    Date:  2024   Initial Evaluation Date: 24                                Evaluating Therapist: Akua Dean OT     Patient Name:  Cheikh Freedman                  :  1990     Restrictions/Precautions:  Follow bilateral distal radius ORIF protocol, follow R olecranon ORIF protocol, bilat conservative distal ulna fx protocol, WB BUEs up to 20#  Diagnosis:  L Distal Radius ORIF (Dorsal Spanning Plate), R Distal Radius ORIF, & R Olecranon ORIF  S52.501A (ICD-10-CM) - Unspecified fracture of the lower end of right radius, initial encounter for closed fracture   S52.502A (ICD-10-CM) - Unspecified fracture of the lower end of left radius, initial encounter for closed fracture   S52.601A (ICD-10-CM) - Unspecified fracture of lower end of right ulna, initial encounter for closed fracture   S52.602A (ICD-10-CM) - Unspecified fracture of lower end of left ulna, initial encounter for closed fracture   S52.021A (ICD-10-CM) - Displaced fracture of olecranon process without intraarticular extension of right ulna, initial encounter for closed fracture   Date of Surgery/Injury: 24 (9 weeks post op)      Insurance/Certification information: [facility] PAYER [id number] (40 visits combined, used 14 as of today )  Plan of care signed (Y/N): Y- signed electronically  Visit# / total visits: 10 / 18     Referring Practitioner: Dr. Lucero  Specific Practitioner Orders: Frequency 2-3 times per week for 6-8 weeks or per therapist discretion  PROM, AAROM, AROM, Stretching, Scar Mgmt, Strengthening, and modalities as needed      Splint needed: Left munster, right volar wrist splint     Assessment of current deficits   [x] ADLs           [x] Strength  [x] IADLs           [x] Endurance     [x] Edema          [x] Fine Motor  Ok for PT, I think the injection orders were sent, but thanks for looking into it further.

## 2024-12-26 ENCOUNTER — OFFICE VISIT (OUTPATIENT)
Dept: ORTHOPEDIC SURGERY | Age: 34
End: 2024-12-26

## 2024-12-26 VITALS
SYSTOLIC BLOOD PRESSURE: 130 MMHG | HEART RATE: 86 BPM | TEMPERATURE: 97.8 F | DIASTOLIC BLOOD PRESSURE: 85 MMHG | OXYGEN SATURATION: 95 %

## 2024-12-26 DIAGNOSIS — T84.84XA PAINFUL ORTHOPAEDIC HARDWARE (HCC): Primary | ICD-10-CM

## 2024-12-26 PROCEDURE — 99024 POSTOP FOLLOW-UP VISIT: CPT | Performed by: STUDENT IN AN ORGANIZED HEALTH CARE EDUCATION/TRAINING PROGRAM

## 2024-12-26 RX ORDER — CIPROFLOXACIN 500 MG/1
500 TABLET, FILM COATED ORAL 2 TIMES DAILY
COMMUNITY

## 2024-12-26 RX ORDER — OXYCODONE AND ACETAMINOPHEN 5; 325 MG/1; MG/1
1 TABLET ORAL EVERY 6 HOURS PRN
COMMUNITY

## 2024-12-26 NOTE — PROGRESS NOTES
Follow Up Post Operative Visit     Surgery: Removal of dorsal spanning plate from left wrist with stress examination under anesthesia left wrist  Date: 12/10/2024    Subjective:    Cheikh Freedman is here for follow up visit s/p above procedure.  He is doing very well.  His pain is well-controlled.  He removed his postoperative splint went back into his OT produce splint.    Controlled Substances Monitoring:        Physical Exam:    No data recorded    General: Alert and oriented x3, no acute distress  Cardiovascular/pulmonary: No labored breathing, peripheral perfusion intact  Musculoskeletal:    Left wrist: Incisions well-approximated sutures removed Steri-Strips placed.  No signs infection or drainage.  Wrist range of motion greatly improved.  Can make a full fist actively extend his fingers.  Sensation to light touch is intact radial ulnar median nerve distribution      Imaging: Multiple views of the left wrist independently interpreted by myself.  Complete removal of hardware noted. healing fracture without change in alignment.  Wrist is shortened and some dorsal angulation.    Assessment and Plan: 2 weeks status post removal of dorsal spanning plate from left wrist, stress examination of left wrist under anesthesia    We discussed his intraoperative findings.  The fracture has not moved so I did not feel he required revision fixation.  We will treat him in a removable wrist brace for 4 weeks.  No heavy lifting pushing or pulling but range of motion as tolerated and is encouraged.  We will see how this continues to heal.  He understands that this is a very severe injury treated by a hand surgeon with a dorsal spanning plate and he may have some restrictions in range of motion and limitations due to posttraumatic arthritis.  Follow-up in 4 weeks for repeat imaging.                Giacomo Norman DO   Orthopaedic Surgery   12/26/24  8:31 AM    Note: This report was completed using computerize voiced recognition

## 2024-12-27 ENCOUNTER — HOSPITAL ENCOUNTER (OUTPATIENT)
Dept: OCCUPATIONAL THERAPY | Age: 34
Setting detail: THERAPIES SERIES
Discharge: HOME | End: 2024-12-27
Payer: COMMERCIAL

## 2024-12-27 PROCEDURE — 97032 APPL MODALITY 1+ESTIM EA 15: CPT | Performed by: OCCUPATIONAL THERAPIST

## 2024-12-27 PROCEDURE — 97140 MANUAL THERAPY 1/> REGIONS: CPT | Performed by: OCCUPATIONAL THERAPIST

## 2024-12-27 PROCEDURE — 97110 THERAPEUTIC EXERCISES: CPT | Performed by: OCCUPATIONAL THERAPIST

## 2024-12-27 NOTE — PROGRESS NOTES
OCCUPATIONAL THERAPY DAILY TREATMENT NOTE/PROGRESS UPDATE  [facility]  [facility] OCCUPATIONAL THERAPY  [address] [facility] [address]  Dept: [phone]  Loc: [phone]   [facility] OT Fax: [phone]    Date:  2024   Initial Evaluation Date: 24                                Evaluating Therapist: Akua Dean OT     Patient Name:  Cheikh Freedman                  :  1990     Restrictions/Precautions:  Follow bilateral distal radius ORIF protocol, follow R olecranon ORIF protocol, bilat conservative distal ulna fx protocol, WB BUEs up to 20#  Diagnosis:  L Distal Radius ORIF (Dorsal Spanning Plate), R Distal Radius ORIF, & R Olecranon ORIF  S52.501A (ICD-10-CM) - Unspecified fracture of the lower end of right radius, initial encounter for closed fracture   S52.502A (ICD-10-CM) - Unspecified fracture of the lower end of left radius, initial encounter for closed fracture   S52.601A (ICD-10-CM) - Unspecified fracture of lower end of right ulna, initial encounter for closed fracture   S52.602A (ICD-10-CM) - Unspecified fracture of lower end of left ulna, initial encounter for closed fracture   S52.021A (ICD-10-CM) - Displaced fracture of olecranon process without intraarticular extension of right ulna, initial encounter for closed fracture   Date of Surgery/Injury: 24 (9 weeks post op)      Insurance/Certification information[facility] PAYER [id number] (40 visits combined, used 14 as of today )  Plan of care signed (Y/N): Y- signed electronically  Visit# / total visits:      Referring Practitioner: Dr. Lucero  Specific Practitioner Orders: Frequency 2-3 times per week for 6-8 weeks or per therapist discretion  PROM, AAROM, AROM, Stretching, Scar Mgmt, Strengthening, and modalities as needed      Splint needed: Left munster, right volar wrist splint     Assessment of current deficits   [x] ADLs           [x] Strength  [x] IADLs           [x] Endurance     [x] Edema          [x]

## 2024-12-31 ENCOUNTER — HOSPITAL ENCOUNTER (OUTPATIENT)
Dept: OCCUPATIONAL THERAPY | Age: 34
Setting detail: THERAPIES SERIES
Discharge: HOME | End: 2024-12-31
Payer: COMMERCIAL

## 2024-12-31 DIAGNOSIS — T84.84XA PAINFUL ORTHOPAEDIC HARDWARE (HCC): Primary | ICD-10-CM

## 2024-12-31 PROCEDURE — 97032 APPL MODALITY 1+ESTIM EA 15: CPT

## 2024-12-31 PROCEDURE — 97140 MANUAL THERAPY 1/> REGIONS: CPT

## 2024-12-31 PROCEDURE — 97110 THERAPEUTIC EXERCISES: CPT

## 2024-12-31 RX ORDER — METHOCARBAMOL 750 MG/1
750 TABLET, FILM COATED ORAL 4 TIMES DAILY
Qty: 40 TABLET | Refills: 0 | Status: SHIPPED | OUTPATIENT
Start: 2024-12-31 | End: 2025-01-10

## 2024-12-31 NOTE — PROGRESS NOTES
OCCUPATIONAL THERAPY DAILY TREATMENT NOTE  Y [facility]  S[facility]LD[facility] OCCUPATIONAL THERAPY  [address] [facility] [address]  Dept: [phone]  Loc: [phone]   [facility] OT Fax: [phone]    Date:  2024   Initial Evaluation Date: 24                                Evaluating Therapist: Akua Dean OT     Patient Name:  Cheikh Freedman                  :  1990     Restrictions/Precautions:  Follow bilateral distal radius ORIF protocol, follow R olecranon ORIF protocol, bilat conservative distal ulna fx protocol, WB BUEs up to 20#  Diagnosis:  L Distal Radius ORIF (Dorsal Spanning Plate), R Distal Radius ORIF, & R Olecranon ORIF  S52.501A (ICD-10-CM) - Unspecified fracture of the lower end of right radius, initial encounter for closed fracture   S52.502A (ICD-10-CM) - Unspecified fracture of the lower end of left radius, initial encounter for closed fracture   S52.601A (ICD-10-CM) - Unspecified fracture of lower end of right ulna, initial encounter for closed fracture   S52.602A (ICD-10-CM) - Unspecified fracture of lower end of left ulna, initial encounter for closed fracture   S52.021A (ICD-10-CM) - Displaced fracture of olecranon process without intraarticular extension of right ulna, initial encounter for closed fracture   Date of Surgery/Injury: 24 (9 weeks post op)      Insurance/Certification information: [facility] PAYER [id number] (40 visits combined, used 14 as of today )  Plan of care signed (Y/N): Y- signed electronically  Visit# / total visits:      Referring Practitioner: Dr. Lucero  Specific Practitioner Orders: Frequency 2-3 times per week for 6-8 weeks or per therapist discretion  PROM, AAROM, AROM, Stretching, Scar Mgmt, Strengthening, and modalities as needed      Splint needed: Left munster, right volar wrist splint     Assessment of current deficits   [x] ADLs           [x] Strength  [x] IADLs           [x] Endurance     [x] Edema          [x] Fine Motor

## 2025-01-02 ENCOUNTER — HOSPITAL ENCOUNTER (OUTPATIENT)
Dept: OCCUPATIONAL THERAPY | Age: 35
Setting detail: THERAPIES SERIES
Discharge: HOME | End: 2025-01-02
Payer: COMMERCIAL

## 2025-01-02 PROCEDURE — 97140 MANUAL THERAPY 1/> REGIONS: CPT

## 2025-01-02 PROCEDURE — 97032 APPL MODALITY 1+ESTIM EA 15: CPT

## 2025-01-02 PROCEDURE — 97110 THERAPEUTIC EXERCISES: CPT

## 2025-01-02 NOTE — PROGRESS NOTES
OCCUPATIONAL THERAPY DAILY TREATMENT NOTE  Y [facility]  S[facility]LD[facility] OCCUPATIONAL THERAPY  [address] [facility] [address]  Dept: [phone]  Loc: [phone]   [facility] OT Fax: [phone]    Date:  2025   Initial Evaluation Date: 24                                Evaluating Therapist: Akua Dean OT     Patient Name:  Cheikh Freedman                  :  1990     Restrictions/Precautions:  Follow bilateral distal radius ORIF protocol, follow R olecranon ORIF protocol, bilat conservative distal ulna fx protocol, WB BUEs up to 20#  Diagnosis:  L Distal Radius ORIF (Dorsal Spanning Plate), R Distal Radius ORIF, & R Olecranon ORIF  S52.501A (ICD-10-CM) - Unspecified fracture of the lower end of right radius, initial encounter for closed fracture   S52.502A (ICD-10-CM) - Unspecified fracture of the lower end of left radius, initial encounter for closed fracture   S52.601A (ICD-10-CM) - Unspecified fracture of lower end of right ulna, initial encounter for closed fracture   S52.602A (ICD-10-CM) - Unspecified fracture of lower end of left ulna, initial encounter for closed fracture   S52.021A (ICD-10-CM) - Displaced fracture of olecranon process without intraarticular extension of right ulna, initial encounter for closed fracture   Date of Surgery/Injury: 24 (9 weeks post op)      Insurance/Certification information: [facility] PAYER [id number] (40 visits combined, used 14 as of today )  Plan of care signed (Y/N): Y- signed electronically  Visit# / total visits:      Referring Practitioner: Dr. Lucero  Specific Practitioner Orders: Frequency 2-3 times per week for 6-8 weeks or per therapist discretion  PROM, AAROM, AROM, Stretching, Scar Mgmt, Strengthening, and modalities as needed      Splint needed: Left munster, right volar wrist splint     Assessment of current deficits   [x] ADLs           [x] Strength  [x] IADLs           [x] Endurance     [x] Edema          [x] Fine Motor

## 2025-01-07 ENCOUNTER — HOSPITAL ENCOUNTER (OUTPATIENT)
Dept: OCCUPATIONAL THERAPY | Age: 35
Setting detail: THERAPIES SERIES
Discharge: HOME | End: 2025-01-07
Payer: COMMERCIAL

## 2025-01-07 DIAGNOSIS — G56.01 RIGHT CARPAL TUNNEL SYNDROME: ICD-10-CM

## 2025-01-07 DIAGNOSIS — S52.571C: ICD-10-CM

## 2025-01-07 DIAGNOSIS — S42.401A CLOSED FRACTURE OF RIGHT ELBOW, INITIAL ENCOUNTER: ICD-10-CM

## 2025-01-07 DIAGNOSIS — T84.84XA PAINFUL ORTHOPAEDIC HARDWARE (HCC): ICD-10-CM

## 2025-01-07 PROCEDURE — 97110 THERAPEUTIC EXERCISES: CPT | Performed by: OCCUPATIONAL THERAPIST

## 2025-01-07 PROCEDURE — 97140 MANUAL THERAPY 1/> REGIONS: CPT | Performed by: OCCUPATIONAL THERAPIST

## 2025-01-07 PROCEDURE — 97018 PARAFFIN BATH THERAPY: CPT | Performed by: OCCUPATIONAL THERAPIST

## 2025-01-07 NOTE — PROGRESS NOTES
with the L UE now since plate removal which significantly limits him from any activities that require resistance.  10) Patient will demonstrate improved functional activity tolerance from Fair to Good for ADL/IADL completion.  Progressing to fair+.   11) Patient will decrease QuickDASH score to 15% or less for increased participation in daily functional activities.   Progressing well, reduced from 70% to 47.7% disability.  12) Increase R elbow strength by at least 1/2 MMG to assist with pushing/pulling items during work related tasks  Goal met for elbow flex, remains the same for elbow ext. ^'d pain with elbow ext against resistance.    TODAY'S TREATMENT     Pain Level: Some mild discomfort with the bicep insertion with bicep strengthening, otherwise no pain in the R UE. Pt reporting ^'d pain to 4/10 in the L UE within the last 2 days - pain radiates from the ulnar wrist to the elbow.    Subjective:  Pt reports that his L wrist has been bothering him more over the last 2 days.     Objective:  Updated POC completed 12/27/24. Next to be completed by 18th session.  INTERVENTION: COMPLETED: SPECIFICS/COMMENTS:   Modality:     US   8 mins with biofreeze over bicep insertion at 3.3 MHz, 1.0 intensity with 100% frequency for pain modulation over site.   E-STIM IFC  10 min to the R elbow for pain management at level 22 intensity. Performed during MH and soft tissue manipulation/stretching of wrists.   Paraffin x 10 min to the L wrist to increase soft tissue, joint, and scar flexibility.    MHP  10 min L hand/wrist and MHP wrapped around R elbow for pain management and to increase flexibility while R elbow on ESTIM..   AROM/AAROM:     R Elbow/forearm      -UBE: 5 mins for gentle tendon gliding of the bicep  -Sup/pro place & holds x 10 ea  -Sup/pro stick 3 discs x 15   L Hand X  -IF PIP blocking with DIP blocked from flexing to really isolate the FDS x 15   L wrist/forearm/hand   X    X        X   X   X    -AROM all planes x

## 2025-01-08 ENCOUNTER — TELEPHONE (OUTPATIENT)
Dept: ORTHOPEDIC SURGERY | Age: 35
End: 2025-01-08

## 2025-01-08 DIAGNOSIS — G89.18 POST-OPERATIVE PAIN: Primary | ICD-10-CM

## 2025-01-08 DIAGNOSIS — T84.84XA PAINFUL ORTHOPAEDIC HARDWARE (HCC): ICD-10-CM

## 2025-01-08 RX ORDER — GABAPENTIN 300 MG/1
300 CAPSULE ORAL 2 TIMES DAILY
Qty: 60 CAPSULE | Refills: 0 | Status: SHIPPED | OUTPATIENT
Start: 2025-01-08 | End: 2025-02-07

## 2025-01-08 RX ORDER — OXYCODONE AND ACETAMINOPHEN 5; 325 MG/1; MG/1
1 TABLET ORAL EVERY 6 HOURS PRN
Qty: 28 TABLET | Refills: 0 | Status: SHIPPED | OUTPATIENT
Start: 2025-01-08 | End: 2025-01-15

## 2025-01-08 NOTE — TELEPHONE ENCOUNTER
Spoke with wife Smiley.  She verbalized understanding of instruction regarding Gabapentin.  Would like Rx for pain med ordered for him to take prn. Will keep appt at end of month, or call sooner if pain does not decrease with Gabapentin and Percocet    Rx pended for Percocet

## 2025-01-08 NOTE — TELEPHONE ENCOUNTER
Surgery: Removal of dorsal spanning plate from left wrist with stress examination under anesthesia left wrist  Date: 12/10/2024    Patient's wife called stating Cheikh had left arm pain 10/10 last night, had difficulty sleeping.  Taking Ibuprofen, Muscle relaxer's and left over Oxy's from surgery.      Has been off Gabapentin x 3 days.  Refill was sent to pharmacy this morning.  Should he get back on Gabapentin and see how he does or schedule OV.

## 2025-01-08 NOTE — TELEPHONE ENCOUNTER
I would have him restart the gabapentin and see if it improves. I can also send in a refill of the pain medication if he needed some more since he isn't quite 6 weeks out. If the pain doesn't improve we can schedule an office visit.

## 2025-01-09 ENCOUNTER — HOSPITAL ENCOUNTER (OUTPATIENT)
Dept: OCCUPATIONAL THERAPY | Age: 35
Setting detail: THERAPIES SERIES
Discharge: HOME | End: 2025-01-09
Payer: COMMERCIAL

## 2025-01-09 PROCEDURE — 97110 THERAPEUTIC EXERCISES: CPT | Performed by: OCCUPATIONAL THERAPIST

## 2025-01-09 PROCEDURE — 97140 MANUAL THERAPY 1/> REGIONS: CPT | Performed by: OCCUPATIONAL THERAPIST

## 2025-01-09 PROCEDURE — 97022 WHIRLPOOL THERAPY: CPT | Performed by: OCCUPATIONAL THERAPIST

## 2025-01-09 NOTE — PROGRESS NOTES
OCCUPATIONAL THERAPY DAILY TREATMENT NOTE  Y [facility]  [facility] OCCUPATIONAL THERAPY  [address] [facility] [address]  Dept: [phone]  Loc: [phone]   [facility] OT Fax: [phone]    Date:  2025   Initial Evaluation Date: 24                                Evaluating Therapist: Akua Dean OT     Patient Name:  Cheikh Freedman                  :  1990     Restrictions/Precautions:  Follow bilateral distal radius ORIF protocol, follow R olecranon ORIF protocol, bilat conservative distal ulna fx protocol, WB BUEs up to 20#  Diagnosis:  L Distal Radius ORIF (Dorsal Spanning Plate), R Distal Radius ORIF, & R Olecranon ORIF  S52.501A (ICD-10-CM) - Unspecified fracture of the lower end of right radius, initial encounter for closed fracture   S52.502A (ICD-10-CM) - Unspecified fracture of the lower end of left radius, initial encounter for closed fracture   S52.601A (ICD-10-CM) - Unspecified fracture of lower end of right ulna, initial encounter for closed fracture   S52.602A (ICD-10-CM) - Unspecified fracture of lower end of left ulna, initial encounter for closed fracture   S52.021A (ICD-10-CM) - Displaced fracture of olecranon process without intraarticular extension of right ulna, initial encounter for closed fracture   Date of Surgery/Injury: 24 (9 weeks post op)      Insurance/Certification information: [facility] PAYER [id number] (40 visits combined, used 14 as of today )  Plan of care signed (Y/N): Y- signed electronically  Visit# / total visits: 15 / 18     Referring Practitioner: Dr. Lucero  Specific Practitioner Orders: Frequency 2-3 times per week for 6-8 weeks or per therapist discretion  PROM, AAROM, AROM, Stretching, Scar Mgmt, Strengthening, and modalities as needed      Splint needed: Left munster, right volar wrist splint     Assessment of current deficits   [x] ADLs           [x] Strength  [x] IADLs           [x] Endurance     [x] Edema          [x] Fine Motor

## 2025-01-14 ENCOUNTER — HOSPITAL ENCOUNTER (OUTPATIENT)
Dept: OCCUPATIONAL THERAPY | Age: 35
Setting detail: THERAPIES SERIES
Discharge: HOME | End: 2025-01-14
Payer: COMMERCIAL

## 2025-01-14 DIAGNOSIS — G56.01 RIGHT CARPAL TUNNEL SYNDROME: Primary | ICD-10-CM

## 2025-01-14 DIAGNOSIS — T84.84XA PAINFUL ORTHOPAEDIC HARDWARE (HCC): ICD-10-CM

## 2025-01-14 DIAGNOSIS — G89.18 POST-OPERATIVE PAIN: ICD-10-CM

## 2025-01-14 PROCEDURE — 97140 MANUAL THERAPY 1/> REGIONS: CPT | Performed by: OCCUPATIONAL THERAPIST

## 2025-01-14 PROCEDURE — 97110 THERAPEUTIC EXERCISES: CPT | Performed by: OCCUPATIONAL THERAPIST

## 2025-01-14 NOTE — PROGRESS NOTES
occ AA to achieve full motion of IF x 6  -Composite fist CRC x 15  -Wrist extension place and holds x 8  -Dynamic wrist mobility: RD circum, UD, circum, oval 8's , exaggerated flex to ext, exaggerated flex to ext x 20 ea  -Hook around pen w/ AA and place and hold x 10  -Composite thumb for object pick ups to base of SF x 20  -Thumb radial abd/add x 20   PROM/Stretching:     L Nerve Gliding x -Radial nerve x ^'d 3 techniques x 10 reps with improved namrata   R Elbow/wrist          -Completed elbow flex/ext in sup/pro/neutral to maximize flexibility with prolonged holds  -Wrist all planes manually  -Elbow flex/ext muscle synergies x 8 ea   L wrist X   X      -Gentle PROM all planes  -Active fisting with wrist passively flexed and passively extended  -[employer] Web: wrist ext stretches x 10 w/ 5 sec holds   Scar Mass/Edema Control:     Soft Tissue X     X    Completed to L wrist to increase tissue and/or joint mobility  -L UE distractions and compression for nerve pain.  Cupping to bicep insertion/medial elbow to ^ circulation, reduce pain  Soft tissue mobilization to common R forearm flexor origin, tightness palpated   Scar Mobs X  To increase scar elasticity to all L wrist/forearm sites   Strengthening/TE/TA:     Bilateral Global                         -R hand 3.3# Ball garcia grasp with sh flexed and abd to 90* and elbow extended: rotate sup/pro x 15 ea position  -R UE: 3# wrist weight to functionally reach to place blue and black resistive pinch pins on vertical nai, supinate to remove  -Rebounder: 2# ball R UE rapid throws x 20, then alternate hands x 20, Advanced to 4# ball with same routine x 20 ea, 9# ball chest passes x 20  -Cybex: tricep ext, sh ext, chest press, high row,  UH bicep curls, OH bicep curls 2x10 at ^'d 3 plates  -3# ball wall circles 20 CW, 20 CCW  -Walk 5.5# Ball up and down wall x 6   R Elbow/Forearm             -Isometrics against mild to mod resistance x 10 ^'d to 20 ex flex and ext  -^'d 5#

## 2025-01-16 ENCOUNTER — APPOINTMENT (OUTPATIENT)
Dept: OCCUPATIONAL THERAPY | Age: 35
End: 2025-01-16
Payer: COMMERCIAL

## 2025-01-17 ENCOUNTER — HOSPITAL ENCOUNTER (OUTPATIENT)
Dept: OCCUPATIONAL THERAPY | Age: 35
Setting detail: THERAPIES SERIES
Discharge: HOME | End: 2025-01-17
Payer: COMMERCIAL

## 2025-01-17 DIAGNOSIS — T84.84XA PAINFUL ORTHOPAEDIC HARDWARE (HCC): ICD-10-CM

## 2025-01-17 PROCEDURE — 97140 MANUAL THERAPY 1/> REGIONS: CPT | Performed by: OCCUPATIONAL THERAPIST

## 2025-01-17 PROCEDURE — 97110 THERAPEUTIC EXERCISES: CPT | Performed by: OCCUPATIONAL THERAPIST

## 2025-01-17 PROCEDURE — 97022 WHIRLPOOL THERAPY: CPT | Performed by: OCCUPATIONAL THERAPIST

## 2025-01-17 RX ORDER — METHOCARBAMOL 750 MG/1
750 TABLET, FILM COATED ORAL 4 TIMES DAILY
Qty: 40 TABLET | Refills: 0 | Status: SHIPPED | OUTPATIENT
Start: 2025-01-17 | End: 2025-01-27

## 2025-01-17 NOTE — PROGRESS NOTES
with the L UE now since plate removal which significantly limits him from any activities that require resistance.  10) Patient will demonstrate improved functional activity tolerance from Fair to Good for ADL/IADL completion.  Progressing to fair+.   11) Patient will decrease QuickDASH score to 15% or less for increased participation in daily functional activities.   Progressing well, reduced from 70% to 47.7% disability.  12) Increase R elbow strength by at least 1/2 MMG to assist with pushing/pulling items during work related tasks  Goal met for elbow flex, remains the same for elbow ext. ^'d pain with elbow ext against resistance.    TODAY'S TREATMENT     Pain Level: 5/10 radiating pains from the back of the hand to the shoulder in the L UE.     Subjective:  Pt reports that the pain is remaining about the same but did feel he had gotten relief from the tape on the wrist.     Objective:  Updated POC completed 12/27/24. Next to be completed by 18th session.  INTERVENTION: COMPLETED: SPECIFICS/COMMENTS:   Modality:     US   8 mins with biofreeze over bicep insertion at 3.3 MHz, 1.0 intensity with 100% frequency for pain modulation over site.   E-STIM IFC   20 min to the L forearm for pain management at level 35 intensity. Performed during stretching of wrist and digital/thumb exercises.   Paraffin  10 min to the L wrist to increase soft tissue, joint, and scar flexibility.    Fluidotherapy x 15 min for desensitization and to increase soft tissue/jnt mobility with guided AROM of the wrist.   AROM/AAROM:     R/L Elbow/forearm      -UBE: 5 mins for gentle tendon gliding of the radial nerve  -Sup/pro place & holds x 10 ea  -Sup/pro stick 3 discs x 15   L Hand X  -MCP's and wrist in flexed position with active IP flex/ext x 20   L wrist/forearm/hand     X                       X  X  -AROM all planes x 15 while hand in fist position  -AAROM all planes x 10  -Jux A Cizer x ^'d 8  -Tendon glides with occ AA to achieve full

## 2025-01-21 ENCOUNTER — HOSPITAL ENCOUNTER (OUTPATIENT)
Dept: OCCUPATIONAL THERAPY | Age: 35
Setting detail: THERAPIES SERIES
Discharge: HOME | End: 2025-01-21
Payer: COMMERCIAL

## 2025-01-21 PROCEDURE — 97140 MANUAL THERAPY 1/> REGIONS: CPT | Performed by: OCCUPATIONAL THERAPIST

## 2025-01-21 PROCEDURE — 97022 WHIRLPOOL THERAPY: CPT | Performed by: OCCUPATIONAL THERAPIST

## 2025-01-21 PROCEDURE — 97110 THERAPEUTIC EXERCISES: CPT | Performed by: OCCUPATIONAL THERAPIST

## 2025-01-21 NOTE — PROGRESS NOTES
Treatment covered based on POC and graduated to patient's progress. Pt education continues at each visit to obtain maximum benefits from skilled OT intervention.  [x]  Alter Plan of care: Continue progression with above POC under new visit count and certification period.   []  Discharge:    Monika Silva MS, OTR/L #[id number]    By co-signing this document as the referring physician, you agree with the recommendation and continuation of occupational therapy services, and approve of the request for additional visits within the pt's allotted number of visits provided by insurance. Thank you!    1-2x a week for 6-9 weeks or 18 additional sessions.  Certification period From: 1/21/25  To: 4/21/25

## 2025-01-23 ENCOUNTER — PROCEDURE VISIT (OUTPATIENT)
Dept: PHYSICAL MEDICINE AND REHAB | Age: 35
End: 2025-01-23

## 2025-01-23 ENCOUNTER — APPOINTMENT (OUTPATIENT)
Dept: OCCUPATIONAL THERAPY | Age: 35
End: 2025-01-23
Payer: COMMERCIAL

## 2025-01-23 ENCOUNTER — HOSPITAL ENCOUNTER (OUTPATIENT)
Dept: OCCUPATIONAL THERAPY | Age: 35
Setting detail: THERAPIES SERIES
Discharge: HOME | End: 2025-01-23
Payer: COMMERCIAL

## 2025-01-23 VITALS — WEIGHT: 315 LBS | BODY MASS INDEX: 40.43 KG/M2 | HEIGHT: 74 IN

## 2025-01-23 DIAGNOSIS — R20.2 LEFT HAND PARESTHESIA: Primary | ICD-10-CM

## 2025-01-23 DIAGNOSIS — S62.102S OPEN FRACTURE OF LEFT WRIST, SEQUELA: ICD-10-CM

## 2025-01-23 DIAGNOSIS — G56.01 RIGHT CARPAL TUNNEL SYNDROME: ICD-10-CM

## 2025-01-23 PROCEDURE — 97022 WHIRLPOOL THERAPY: CPT | Performed by: OCCUPATIONAL THERAPIST

## 2025-01-23 PROCEDURE — 97110 THERAPEUTIC EXERCISES: CPT | Performed by: OCCUPATIONAL THERAPIST

## 2025-01-23 PROCEDURE — 97140 MANUAL THERAPY 1/> REGIONS: CPT | Performed by: OCCUPATIONAL THERAPIST

## 2025-01-23 NOTE — PATIENT INSTRUCTIONS
Today you had an electrodiagnostic exam which included nerve conduction studies (NCS) and electromyography (EMG). This test evaluated the electrical activity of your nerves and muscles to help determine if you have a nerve or muscle disease.  This test can help determine the location and type of a nerve or muscle problem. This will help your referring doctor diagnose your condition and determine the appropriate next step in your treatment plan.      After your test:     1. There are no long lasting side effects of the test.      2. You may resume your normal activities without restrictions.      3.  Resume any medications that were stopped for the test.     4  If you have sore areas or bruising in your muscles where the needle was placed, apply a cold pack to the sore area for 15-20 minutes three to four times a day as needed for pain.  The soreness should go away in about 1-2 days.      5. Your results were provided  Briefly at the end of your test and the final detailed report will be provided to your referring physician, and/or primary care physician and any other parties you requested within 1-2 days of the examination. You may wish to contact your referring provider after a few days to determine what they would like you to do next.      6.  Please call [phone] with any questions or concerns and if you develop increased body temperature/fever, swelling, tenderness, increased pain and/or drainage from the sites where the needle was placed.      Thank you for choosing us for your health care needs.

## 2025-01-23 NOTE — PROGRESS NOTES
OCCUPATIONAL THERAPY DAILY TREATMENT NOTE  Y [facility]  [facility]  [address] [facility] [address]  Dept: [phone]  Loc: [phone]   [facility] OT Fax: [phone]    Date:  2025   Initial Evaluation Date: 24                                Evaluating Therapist: Akua Dean OT     Patient Name:  Cheikh Freedman                  :  1990     Restrictions/Precautions:  Follow bilateral distal radius ORIF protocol, follow R olecranon ORIF protocol, bilat conservative distal ulna fx protocol, WB BUEs up to 20#  Diagnosis:  L Distal Radius ORIF (Dorsal Spanning Plate), R Distal Radius ORIF, & R Olecranon ORIF  S52.501A (ICD-10-CM) - Unspecified fracture of the lower end of right radius, initial encounter for closed fracture   S52.502A (ICD-10-CM) - Unspecified fracture of the lower end of left radius, initial encounter for closed fracture   S52.601A (ICD-10-CM) - Unspecified fracture of lower end of right ulna, initial encounter for closed fracture   S52.602A (ICD-10-CM) - Unspecified fracture of lower end of left ulna, initial encounter for closed fracture   S52.021A (ICD-10-CM) - Displaced fracture of olecranon process without intraarticular extension of right ulna, initial encounter for closed fracture   Date of Surgery/Injury: 24 & 12/10/24    Insurance/Certification information: [facility] PAYER [id number] (40 visits combined, used 6 as of )  Plan of care signed (Y/N): Y- signed electronically  Visit# / total visits:  (18 already completed     Referring Practitioner: Dr. Lucero  Specific Practitioner Orders: Frequency 2-3 times per week for 6-8 weeks or per therapist discretion  PROM, AAROM, AROM, Stretching, Scar Mgmt, Strengthening, and modalities as needed      Splint needed: Left munster, right volar wrist splint     Assessment of current deficits   [x] ADLs           [x] Strength  [x] IADLs           [x] Endurance     [x] Edema          [x] Fine Motor

## 2025-01-23 NOTE — PROGRESS NOTES
Electrodiagnostic Laboratory  *Accredited by the [facility] with exemplary status  [facility]YSICAL MEDICINE AND [facility]ILITA[facility]  [address] [place] [address]  [address]  [facility] [address]  Dept: [phone]  Loc: [phone]      Date of Examination: 01/23/25    Patient Name: Cheikh Freedman    An independent historian was not needed.     Cheikh Freedman  is a 34 y.o. year old male who was seen today regarding   Chief Complaint   Patient presents with    Extremity Pain     Left thumb has a numb, burning feeling.  Symptoms for 2 weeks.     Numbness     Right hand index, middle finger and thumb numbness.     Extremity Weakness     Left hand weakness    .        on the visual analog scale where 0 is no pain and 10 is the worst pain possible.   The symptoms are {Desc; intermittent/persistent/constant:96800}.       I have reviewed the referring provider's office note.    There is not a family history of neuromuscular conditions.     Physical Exam: General: The patient is in no apparent distress.  MSK: There is no joint effusion, deformity, instability, swelling, erythema or warmth.  AROM is full in the spine and extremities. ***. Neurologic:  No focal sensorimotor deficit.  Reflexes 2+ and symmetric. Gait is normal.    Impression:     1. Left hand paresthesia    2. Right carpal tunnel syndrome    3. Open fracture of left wrist, sequela        Plan:   EMG is indicated to evaluate the above diagnosis.    No orders of the defined types were placed in this encounter.    EMG was done today and showed ***.     The patient was educated about the diagnosis and the prognosis.   Advised patient to follow up with referring provider.       Thank you for allowing me to participate in the care of your patient.      Sincerely,     Kenya Esteban MD  Board Certified Physical Medicine and Rehabilitation               
likely suggestive for a mild axonal median nerve injury in the setting of prior trauma and fractures. Clinical correlation is advised.  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  No electrodiagnostic evidence for cervical radiculopathy bilaterally.      There is not a prior study for comparison.     Follow up EMG is recommended in 3-6 months pending clinical progress.    Technician: Cristina Roberto  Physician: Kenya Esteban MD  Board Certified Physical Medicine and Rehabilitation     Nerve conduction studies and electromyography were performed according to our laboratory policies and procedures which can be provided upon request. All abnormal values are identified in the table. Laboratory normal values can also be provided upon request.       Cc: Giacomo Norman DO Black, Michael L, MD

## 2025-01-28 ENCOUNTER — HOSPITAL ENCOUNTER (OUTPATIENT)
Dept: OCCUPATIONAL THERAPY | Age: 35
Setting detail: THERAPIES SERIES
Discharge: HOME | End: 2025-01-28
Payer: COMMERCIAL

## 2025-01-28 PROCEDURE — 97022 WHIRLPOOL THERAPY: CPT | Performed by: OCCUPATIONAL THERAPIST

## 2025-01-28 PROCEDURE — 97110 THERAPEUTIC EXERCISES: CPT | Performed by: OCCUPATIONAL THERAPIST

## 2025-01-28 PROCEDURE — 97140 MANUAL THERAPY 1/> REGIONS: CPT | Performed by: OCCUPATIONAL THERAPIST

## 2025-01-28 NOTE — PROGRESS NOTES
OCCUPATIONAL THERAPY DAILY TREATMENT NOTE  Y [facility]  [facility]  [address] [facility] [address]  Dept: [phone]  Loc: [phone]   [facility] OT Fax: [phone]    Date:  2025   Initial Evaluation Date: 24                                Evaluating Therapist: Akua Dean OT     Patient Name:  Cheikh Freedman                  :  1990     Restrictions/Precautions:  Follow bilateral distal radius ORIF protocol, follow R olecranon ORIF protocol, bilat conservative distal ulna fx protocol, WB BUEs up to 20#  Diagnosis:  L Distal Radius ORIF (Dorsal Spanning Plate), R Distal Radius ORIF, & R Olecranon ORIF  S52.501A (ICD-10-CM) - Unspecified fracture of the lower end of right radius, initial encounter for closed fracture   S52.502A (ICD-10-CM) - Unspecified fracture of the lower end of left radius, initial encounter for closed fracture   S52.601A (ICD-10-CM) - Unspecified fracture of lower end of right ulna, initial encounter for closed fracture   S52.602A (ICD-10-CM) - Unspecified fracture of lower end of left ulna, initial encounter for closed fracture   S52.021A (ICD-10-CM) - Displaced fracture of olecranon process without intraarticular extension of right ulna, initial encounter for closed fracture   Date of Surgery/Injury: 24 & 12/10/24    Insurance/Certification information: [facility] PAYER [id number] (40 visits combined, used 6 as of )  Plan of care signed (Y/N): Y- signed electronically  Visit# / total visits:  (18 already completed     Referring Practitioner: Dr. Lucero  Specific Practitioner Orders: Frequency 2-3 times per week for 6-8 weeks or per therapist discretion  PROM, AAROM, AROM, Stretching, Scar Mgmt, Strengthening, and modalities as needed      Splint needed: Left munster, right volar wrist splint     Assessment of current deficits   [x] ADLs           [x] Strength  [x] IADLs           [x] Endurance     [x] Edema          [x] Fine Motor

## 2025-01-30 ENCOUNTER — OFFICE VISIT (OUTPATIENT)
Dept: ORTHOPEDIC SURGERY | Age: 35
End: 2025-01-30

## 2025-01-30 VITALS
HEART RATE: 85 BPM | TEMPERATURE: 97.9 F | DIASTOLIC BLOOD PRESSURE: 91 MMHG | OXYGEN SATURATION: 95 % | SYSTOLIC BLOOD PRESSURE: 135 MMHG

## 2025-01-30 DIAGNOSIS — T84.84XA PAINFUL ORTHOPAEDIC HARDWARE (HCC): Primary | ICD-10-CM

## 2025-01-30 DIAGNOSIS — M79.2 NEUROPATHIC PAIN: ICD-10-CM

## 2025-01-30 DIAGNOSIS — G89.18 POST-OPERATIVE PAIN: ICD-10-CM

## 2025-01-30 PROCEDURE — 99024 POSTOP FOLLOW-UP VISIT: CPT | Performed by: STUDENT IN AN ORGANIZED HEALTH CARE EDUCATION/TRAINING PROGRAM

## 2025-01-30 RX ORDER — METHYLPREDNISOLONE 4 MG/1
TABLET ORAL
Qty: 21 TABLET | Refills: 0 | Status: SHIPPED | OUTPATIENT
Start: 2025-01-30 | End: 2025-02-05

## 2025-01-30 NOTE — PROGRESS NOTES
Follow Up Post Operative Visit     Surgery: Removal of dorsal spanning plate from left wrist with stress examination under anesthesia left wrist  Date: 12/10/2024    Subjective:    Cheikh Freedman is here for follow up visit s/p above procedure.  Since his last visit he has been having increasing pain neurologic type symptoms with radiation up and down his left arm.  He complains of paresthesias and pain in the dorsal surface of his hand rating all the way to his shoulder.  He also continues to have numbness and tingling in both his right and left hand median nerve distribution.  He did have an EMG since his last visit.  The gabapentin does not appear to be helping his symptoms.      The superficial sensory branch of the radial nerve issue started about 2 weeks after his 2-week postoperative follow-up appointment without any injury.  He did see Dr. Lucero with a hebert payment for second opinion who provided a bone stimulator to aid in the residual healing of his distal radius fracture    Controlled Substances Monitoring:        Physical Exam:    BP: (!) 135/91    General: Alert and oriented x3, no acute distress  Cardiovascular/pulmonary: No labored breathing, peripheral perfusion intact  Musculoskeletal:    Left wrist: Incisions well-approximated sutures removed Steri-Strips placed.  No signs infection or drainage.  Wrist range of motion greatly improved.  Can make a full fist actively extend his fingers.  Sensation to light touch is intact radial ulnar median nerve distribution      Imaging: Multiple views of the left wrist independently interpreted by myself.  Complete removal of hardware noted. healing fracture without change in alignment.  Wrist is shortened and some dorsal angulation.  The fracture has consolidated significantly since his previous visit.  No change in alignment.  Widening of the DRUJ noted.  Radiocarpal degenerative changes noted.    I discussed EMG with Dr. Esteban.  The results are not finalized

## 2025-01-31 ENCOUNTER — HOSPITAL ENCOUNTER (OUTPATIENT)
Dept: OCCUPATIONAL THERAPY | Age: 35
Setting detail: THERAPIES SERIES
Discharge: HOME | End: 2025-01-31
Payer: COMMERCIAL

## 2025-01-31 PROCEDURE — 97110 THERAPEUTIC EXERCISES: CPT | Performed by: OCCUPATIONAL THERAPIST

## 2025-01-31 PROCEDURE — 97140 MANUAL THERAPY 1/> REGIONS: CPT | Performed by: OCCUPATIONAL THERAPIST

## 2025-01-31 NOTE — PROGRESS NOTES
OCCUPATIONAL THERAPY DAILY TREATMENT NOTE  Y [facility]  [facility]  [address] [facility] [address]  Dept: [phone]  Loc: [phone]   [facility] OT Fax: [phone]    Date:  2025   Initial Evaluation Date: 24                                Evaluating Therapist: Akua Dean OT     Patient Name:  Cheikh Freedman                  :  1990     Restrictions/Precautions:  Follow bilateral distal radius ORIF protocol, follow R olecranon ORIF protocol, bilat conservative distal ulna fx protocol, WB BUEs up to 20#  Diagnosis:  L Distal Radius ORIF (Dorsal Spanning Plate), R Distal Radius ORIF, & R Olecranon ORIF  S52.501A (ICD-10-CM) - Unspecified fracture of the lower end of right radius, initial encounter for closed fracture   S52.502A (ICD-10-CM) - Unspecified fracture of the lower end of left radius, initial encounter for closed fracture   S52.601A (ICD-10-CM) - Unspecified fracture of lower end of right ulna, initial encounter for closed fracture   S52.602A (ICD-10-CM) - Unspecified fracture of lower end of left ulna, initial encounter for closed fracture   S52.021A (ICD-10-CM) - Displaced fracture of olecranon process without intraarticular extension of right ulna, initial encounter for closed fracture   Date of Surgery/Injury: 24 & 12/10/24    Insurance/Certification information: [facility] PAYER [id number] (40 visits combined, used 6 as of )  Plan of care signed (Y/N): Y- signed electronically  Visit# / total visits: 3 / 18 (18 already completed     Referring Practitioner: Dr. Lucero  Specific Practitioner Orders: Frequency 2-3 times per week for 6-8 weeks or per therapist discretion  PROM, AAROM, AROM, Stretching, Scar Mgmt, Strengthening, and modalities as needed      Splint needed: Left munster, right volar wrist splint     Assessment of current deficits   [x] ADLs           [x] Strength  [x] IADLs           [x] Endurance     [x] Edema          [x] Fine Motor

## 2025-02-04 ENCOUNTER — HOSPITAL ENCOUNTER (OUTPATIENT)
Dept: OCCUPATIONAL THERAPY | Age: 35
Setting detail: THERAPIES SERIES
Discharge: HOME | End: 2025-02-04
Payer: COMMERCIAL

## 2025-02-04 PROCEDURE — 97140 MANUAL THERAPY 1/> REGIONS: CPT | Performed by: OCCUPATIONAL THERAPIST

## 2025-02-04 PROCEDURE — 97110 THERAPEUTIC EXERCISES: CPT | Performed by: OCCUPATIONAL THERAPIST

## 2025-02-04 NOTE — PROGRESS NOTES
occ AA to achieve full motion of IF x 6  -Composite fist CRC x 15  -Wrist extension place and holds x 8  -Dynamic wrist mobility: RD circum, UD, circum, oval 8's , exaggerated flex to ext, exaggerated flex to ext x 20 ea  -Hook around pen w/ AA and place and hold x 10  -Composite thumb for object pick ups to base of SF x 20  -Thumb radial abd/add x 20  -True Balance for wrist stability x 5 mins  -Table top scrubbing: slow and controlled 2x20   PROM/Stretching:     L Nerve Gliding x -Radial nerve x ^'d 3 techniques x ^'d 15 reps    L wrist X   X          -Gentle PROM all planes  -Active fisting with wrist passively flexed and passively extended  -[employer] Web: wrist ext stretches x 10 w/ 5 sec holds  -Shifting forward and back on foam roll while standing for wrist ext stretch x 10 with holds   Scar Mass/Edema Control:     Soft Tissue X     X    Completed to L wrist to increase tissue and/or joint mobility  -L UE distractions and compression for nerve pain.  Cupping to bicep insertion/medial elbow to ^ circulation, reduce pain  Soft tissue mobilization to common R forearm flexor origin, tightness palpated   Scar Mobs X  To increase scar elasticity to all L wrist/forearm sites   Strengthening/TE/TA:     L Wrist   X   X    X              -Isometric Ball Presses x 10 with 5 sec holds  -^'d 2# Wrist extension 2x20  -2# 4 sec eccentric wrist flex with assisted wrist ext 2x10  -Sup/pro bar with 1 plate: sup/pro x 20, RD/UD x 20, CW circum x 20, CCW circum x 20  -Isometric Wrist extension/flexion against mild resistance 2x8  -Orange T-band: wrist extension 2x15, + to HEP.  -4# dumbbell in L UE with 3 laps around clinic, then with 5# x 3 laps.  -1.1# Ball pick ups with maintaining wrist in neutral x 20, then circumduction x 20 CW and x 20 CCW   Bilateral Global                         -R hand 3.3# Ball garcia grasp with sh flexed and abd to 90* and elbow extended: rotate sup/pro x 15 ea position  -R UE: 3# wrist weight to

## 2025-02-06 ENCOUNTER — HOSPITAL ENCOUNTER (OUTPATIENT)
Dept: OCCUPATIONAL THERAPY | Age: 35
Setting detail: THERAPIES SERIES
End: 2025-02-06
Payer: COMMERCIAL

## 2025-02-07 ENCOUNTER — HOSPITAL ENCOUNTER (OUTPATIENT)
Dept: OCCUPATIONAL THERAPY | Age: 35
Setting detail: THERAPIES SERIES
Discharge: HOME | End: 2025-02-07
Payer: COMMERCIAL

## 2025-02-07 PROCEDURE — 97140 MANUAL THERAPY 1/> REGIONS: CPT | Performed by: OCCUPATIONAL THERAPIST

## 2025-02-07 PROCEDURE — 97110 THERAPEUTIC EXERCISES: CPT | Performed by: OCCUPATIONAL THERAPIST

## 2025-02-07 NOTE — PROGRESS NOTES
OCCUPATIONAL THERAPY DAILY TREATMENT NOTE  [facility]  [facility]  [address] [facility] [address]  Dept: [phone]  Loc: [phone]   [facility] OT Fax: [phone]    Date:  2025   Initial Evaluation Date: 24                                Evaluating Therapist: Akua Dean OT     Patient Name:  Cheikh Freedman                  :  1990     Restrictions/Precautions:  Follow bilateral distal radius ORIF protocol, follow R olecranon ORIF protocol, bilat conservative distal ulna fx protocol, WB BUEs up to 20#  Diagnosis:  L Distal Radius ORIF (Dorsal Spanning Plate), R Distal Radius ORIF, & R Olecranon ORIF  S52.501A (ICD-10-CM) - Unspecified fracture of the lower end of right radius, initial encounter for closed fracture   S52.502A (ICD-10-CM) - Unspecified fracture of the lower end of left radius, initial encounter for closed fracture   S52.601A (ICD-10-CM) - Unspecified fracture of lower end of right ulna, initial encounter for closed fracture   S52.602A (ICD-10-CM) - Unspecified fracture of lower end of left ulna, initial encounter for closed fracture   S52.021A (ICD-10-CM) - Displaced fracture of olecranon process without intraarticular extension of right ulna, initial encounter for closed fracture   Date of Surgery/Injury: 24 & 12/10/24    Insurance/Certification information[facility] PAYER [id number] (40 visits combined, used 6 as of )  Plan of care signed (Y/N): Y- signed electronically  Visit# / total visits:  (18 already completed     Referring Practitioner: Dr. Lucero  Specific Practitioner Orders: Frequency 2-3 times per week for 6-8 weeks or per therapist discretion  PROM, AAROM, AROM, Stretching, Scar Mgmt, Strengthening, and modalities as needed      Splint needed: Left munster, right volar wrist splint     Assessment of current deficits   [x] ADLs           [x] Strength  [x] IADLs           [x] Endurance     [x] Edema          [x] Fine Motor

## 2025-02-11 ENCOUNTER — HOSPITAL ENCOUNTER (OUTPATIENT)
Dept: OCCUPATIONAL THERAPY | Age: 35
Setting detail: THERAPIES SERIES
Discharge: HOME | End: 2025-02-11
Payer: COMMERCIAL

## 2025-02-11 NOTE — PROGRESS NOTES
Phone: [phone] Fax: [phone]    Occupational Therapy   Cancellation/No-show Note     Patient Name:  Cheikh Freedman  : 1990  Date:  2025  MRN: [id number]    For today's appointment patient:       [x]  Cancelled   []  Rescheduled appointment   []  No-show     Reason given by patient:   []  Patient ill   []  Conflicting appointment   []  No transportation   []  Conflict with work   []  No reason given   [x]  Other:     Comments: Pt's daughter is ill and he has to stay home with her today as his wife has to go to work. He plans to be in attendance for his next scheduled appt.    Electronically signed by: CIERRA Muñiz/L, MS, #[id number]

## 2025-02-13 ENCOUNTER — HOSPITAL ENCOUNTER (OUTPATIENT)
Dept: OCCUPATIONAL THERAPY | Age: 35
Setting detail: THERAPIES SERIES
Discharge: HOME | End: 2025-02-13
Payer: COMMERCIAL

## 2025-02-13 PROCEDURE — 97140 MANUAL THERAPY 1/> REGIONS: CPT | Performed by: OCCUPATIONAL THERAPIST

## 2025-02-13 PROCEDURE — 97110 THERAPEUTIC EXERCISES: CPT | Performed by: OCCUPATIONAL THERAPIST

## 2025-02-13 PROCEDURE — 97035 APP MDLTY 1+ULTRASOUND EA 15: CPT | Performed by: OCCUPATIONAL THERAPIST

## 2025-02-13 NOTE — PROGRESS NOTES
OCCUPATIONAL THERAPY DAILY TREATMENT NOTE  [facility]  [facility]  [address] [facility] [address]  Dept: [phone]  Loc: [phone]   [facility] OT Fax: [phone]    Date:  2025   Initial Evaluation Date: 24                                Evaluating Therapist: Akua Dean OT     Patient Name:  Cheikh Freedman                  :  1990     Restrictions/Precautions:  Follow bilateral distal radius ORIF protocol, follow R olecranon ORIF protocol, bilat conservative distal ulna fx protocol, WB BUEs up to 20#  Diagnosis:  L Distal Radius ORIF (Dorsal Spanning Plate), R Distal Radius ORIF, & R Olecranon ORIF  S52.501A (ICD-10-CM) - Unspecified fracture of the lower end of right radius, initial encounter for closed fracture   S52.502A (ICD-10-CM) - Unspecified fracture of the lower end of left radius, initial encounter for closed fracture   S52.601A (ICD-10-CM) - Unspecified fracture of lower end of right ulna, initial encounter for closed fracture   S52.602A (ICD-10-CM) - Unspecified fracture of lower end of left ulna, initial encounter for closed fracture   S52.021A (ICD-10-CM) - Displaced fracture of olecranon process without intraarticular extension of right ulna, initial encounter for closed fracture   Date of Surgery/Injury: 24 & 12/10/24    Insurance/Certification information: [facility] PAYER [id number] (40 visits combined, used 6 as of )  Plan of care signed (Y/N): Y- signed electronically  Visit# / total visits:  (18 already completed     Referring Practitioner: Dr. Lucero  Specific Practitioner Orders: Frequency 2-3 times per week for 6-8 weeks or per therapist discretion  PROM, AAROM, AROM, Stretching, Scar Mgmt, Strengthening, and modalities as needed      Splint needed: Left munster, right volar wrist splint     Assessment of current deficits   [x] ADLs           [x] Strength  [x] IADLs           [x] Endurance     [x] Edema          [x] Fine Motor

## 2025-02-18 ENCOUNTER — HOSPITAL ENCOUNTER (OUTPATIENT)
Dept: OCCUPATIONAL THERAPY | Age: 35
Setting detail: THERAPIES SERIES
Discharge: HOME | End: 2025-02-18
Payer: COMMERCIAL

## 2025-02-18 PROCEDURE — 97140 MANUAL THERAPY 1/> REGIONS: CPT | Performed by: OCCUPATIONAL THERAPIST

## 2025-02-18 PROCEDURE — 97035 APP MDLTY 1+ULTRASOUND EA 15: CPT | Performed by: OCCUPATIONAL THERAPIST

## 2025-02-18 PROCEDURE — 97110 THERAPEUTIC EXERCISES: CPT | Performed by: OCCUPATIONAL THERAPIST

## 2025-02-18 NOTE — PROGRESS NOTES
Coordination    [x] Sensation   [x] Scar Adhesion/Skin Integrity   [x] Gross Motor Coordination [x] ROM           [x] Pain                OT PLAN OF CARE   OT POC based on physician orders, patient diagnosis and results of clinical assessment     Frequency/Duration: Frequency/Duration 1-2x / week for 18 visits.   Certification period From: 11/5/24  To: 2/5/25  NEW: 1-2x a week for 6-9 weeks or 18 additional sessions.    Certification period From: 1/21/25  To: 4/21/25    Specific OT Treatment to include:   [x] Instruction in HEP                   Modalities:  [x] Therapeutic Exercise              [x] Ultrasound               [] Electrical Stimulation/Attended  [x] PROM/Stretching                    [x] Fluidotherapy          [x]  Paraffin                   [x] AAROM  [x] AROM                 [] Iontophoresis:   [x] Tendon Glides                         [x] Scar Management                        [x] Therapeutic Activity                [x] Pain Management with/without modalities PRN                 [x] Manual Therapy                      [x] Splinting                                                                   [x] Joint Mobilization                    [x] Manual Edema Mobilization   [x] Myofascial Release                 [x] Desensitization     [x] GM/FM Coordination              [x] Safety retraining/education per  individual diagnosis/goals     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 L UE significantly delaying functional participation.                              GOALS (Long term same as Short term): Updated 12/27/2024 and 1/21/2025  1) Patient will demonstrate good understanding of home program (exercises/activities/diagnosis/prognosis/goals) with at least 75% accuracy.   Continue with R UE strengthening program. L UE AROM/stretching/pain&scar management programs in place but pain remains persistently high.   2) Patient will demonstrate increased

## 2025-02-20 ENCOUNTER — HOSPITAL ENCOUNTER (OUTPATIENT)
Dept: OCCUPATIONAL THERAPY | Age: 35
Setting detail: THERAPIES SERIES
Discharge: HOME | End: 2025-02-20
Payer: COMMERCIAL

## 2025-02-20 PROCEDURE — 97110 THERAPEUTIC EXERCISES: CPT | Performed by: OCCUPATIONAL THERAPIST

## 2025-02-20 NOTE — PROGRESS NOTES
OCCUPATIONAL THERAPY DAILY TREATMENT NOTE  Y [facility]  SHELDON OCCUPATIONAL THERAPY  [address] [facility] [address]  Dept: [phone]  Loc: [phone]   [facility] OT Fax: [phone]    Date:  2025   Initial Evaluation Date: 24                                Evaluating Therapist: Akua Dean OT     Patient Name:  Cheikh Freedman                  :  1990     Restrictions/Precautions:  Follow bilateral distal radius ORIF protocol, follow R olecranon ORIF protocol, bilat conservative distal ulna fx protocol, WB BUEs up to 20#  Diagnosis:  L Distal Radius ORIF (Dorsal Spanning Plate), R Distal Radius ORIF, & R Olecranon ORIF  S52.501A (ICD-10-CM) - Unspecified fracture of the lower end of right radius, initial encounter for closed fracture   S52.502A (ICD-10-CM) - Unspecified fracture of the lower end of left radius, initial encounter for closed fracture   S52.601A (ICD-10-CM) - Unspecified fracture of lower end of right ulna, initial encounter for closed fracture   S52.602A (ICD-10-CM) - Unspecified fracture of lower end of left ulna, initial encounter for closed fracture   S52.021A (ICD-10-CM) - Displaced fracture of olecranon process without intraarticular extension of right ulna, initial encounter for closed fracture   Date of Surgery/Injury: 24 & 12/10/24    Insurance/Certification information: [facility] PAYER [id number] (40 visits combined, used 6 as of )  Plan of care signed (Y/N): Y- signed electronically  Visit# / total visits:  (18 already completed     Referring Practitioner: Dr. Lucero  Specific Practitioner Orders: Frequency 2-3 times per week for 6-8 weeks or per therapist discretion  PROM, AAROM, AROM, Stretching, Scar Mgmt, Strengthening, and modalities as needed      Splint needed: Left munster, right volar wrist splint     Assessment of current deficits   [x] ADLs           [x] Strength  [x] IADLs           [x] Endurance     [x] Edema          [x] Fine Motor

## 2025-02-25 ENCOUNTER — HOSPITAL ENCOUNTER (OUTPATIENT)
Dept: OCCUPATIONAL THERAPY | Age: 35
Setting detail: THERAPIES SERIES
Discharge: HOME | End: 2025-02-25
Payer: COMMERCIAL

## 2025-02-25 PROCEDURE — 97110 THERAPEUTIC EXERCISES: CPT | Performed by: OCCUPATIONAL THERAPIST

## 2025-02-25 PROCEDURE — 97530 THERAPEUTIC ACTIVITIES: CPT | Performed by: OCCUPATIONAL THERAPIST

## 2025-02-25 NOTE — PROGRESS NOTES
OCCUPATIONAL THERAPY DAILY TREATMENT NOTE  Y [facility]  SHELDON OCCUPATIONAL THERAPY  [address] [facility] [address]  Dept: [phone]  Loc: [phone]   [facility] OT Fax: [phone]    Date:  2025   Initial Evaluation Date: 24                                Evaluating Therapist: Akua Dean OT     Patient Name:  Cheikh Freedman                  :  1990     Restrictions/Precautions:  Follow bilateral distal radius ORIF protocol, follow R olecranon ORIF protocol, bilat conservative distal ulna fx protocol, WB BUEs up to 20#  Diagnosis:  L Distal Radius ORIF (Dorsal Spanning Plate), R Distal Radius ORIF, & R Olecranon ORIF  S52.501A (ICD-10-CM) - Unspecified fracture of the lower end of right radius, initial encounter for closed fracture   S52.502A (ICD-10-CM) - Unspecified fracture of the lower end of left radius, initial encounter for closed fracture   S52.601A (ICD-10-CM) - Unspecified fracture of lower end of right ulna, initial encounter for closed fracture   S52.602A (ICD-10-CM) - Unspecified fracture of lower end of left ulna, initial encounter for closed fracture   S52.021A (ICD-10-CM) - Displaced fracture of olecranon process without intraarticular extension of right ulna, initial encounter for closed fracture   Date of Surgery/Injury: 24 & 12/10/24    Insurance/Certification information: [facility] PAYER [id number] (40 visits combined, used 6 as of )  Plan of care signed (Y/N): Y- signed electronically  Visit# / total visits: 10 / 18 (18 already completed     Referring Practitioner: Dr. Lucero  Specific Practitioner Orders: Frequency 2-3 times per week for 6-8 weeks or per therapist discretion  PROM, AAROM, AROM, Stretching, Scar Mgmt, Strengthening, and modalities as needed      Splint needed: Left munster, right volar wrist splint     Assessment of current deficits   [x] ADLs           [x] Strength  [x] IADLs           [x] Endurance     [x] Edema          [x] Fine Motor

## 2025-02-25 NOTE — PROGRESS NOTES
Phone: [phone] Fax: [phone]    Occupational Therapy   Cancellation/No-show Note     Patient Name:  Cheikh Freedman  : 1990  Date:  2025  MRN: [id number]    For today's appointment patient:       []  Cancelled   []  Rescheduled appointment   [x]  No-show     Reason given by patient:   []  Patient ill   []  Conflicting appointment   []  No transportation   []  Conflict with work   []  No reason given   []  Other:     Comments: LVM to wife.     Electronically signed by: CIERRA Muñiz/L, MS, #[id number]

## 2025-02-27 ENCOUNTER — HOSPITAL ENCOUNTER (OUTPATIENT)
Dept: OCCUPATIONAL THERAPY | Age: 35
Setting detail: THERAPIES SERIES
Discharge: HOME | End: 2025-02-27
Payer: COMMERCIAL

## 2025-02-27 PROCEDURE — 97110 THERAPEUTIC EXERCISES: CPT | Performed by: OCCUPATIONAL THERAPIST

## 2025-02-27 NOTE — PROGRESS NOTES
OCCUPATIONAL THERAPY DAILY TREATMENT NOTE  Y [facility]  SHELDON OCCUPATIONAL THERAPY  [address] [facility] [address]  Dept: [phone]  Loc: [phone]   [facility] OT Fax: [phone]    Date:  2025   Initial Evaluation Date: 24                                Evaluating Therapist: Akua Dean OT     Patient Name:  Cheikh Freedman                  :  1990     Restrictions/Precautions:  Follow bilateral distal radius ORIF protocol, follow R olecranon ORIF protocol, bilat conservative distal ulna fx protocol, WB BUEs up to 20#  Diagnosis:  L Distal Radius ORIF (Dorsal Spanning Plate), R Distal Radius ORIF, & R Olecranon ORIF  S52.501A (ICD-10-CM) - Unspecified fracture of the lower end of right radius, initial encounter for closed fracture   S52.502A (ICD-10-CM) - Unspecified fracture of the lower end of left radius, initial encounter for closed fracture   S52.601A (ICD-10-CM) - Unspecified fracture of lower end of right ulna, initial encounter for closed fracture   S52.602A (ICD-10-CM) - Unspecified fracture of lower end of left ulna, initial encounter for closed fracture   S52.021A (ICD-10-CM) - Displaced fracture of olecranon process without intraarticular extension of right ulna, initial encounter for closed fracture   Date of Surgery/Injury: 24 & 12/10/24    Insurance/Certification information: [facility] PAYER [id number] (40 visits combined, used 6 as of )  Plan of care signed (Y/N): Y- signed electronically  Visit# / total visits:  (18 already completed     Referring Practitioner: Dr. Lucero  Specific Practitioner Orders: Frequency 2-3 times per week for 6-8 weeks or per therapist discretion  PROM, AAROM, AROM, Stretching, Scar Mgmt, Strengthening, and modalities as needed      Splint needed: Left munster, right volar wrist splint     Assessment of current deficits   [x] ADLs           [x] Strength  [x] IADLs           [x] Endurance     [x] Edema          [x] Fine Motor

## 2025-03-04 ENCOUNTER — HOSPITAL ENCOUNTER (OUTPATIENT)
Dept: OCCUPATIONAL THERAPY | Age: 35
Setting detail: THERAPIES SERIES
Discharge: HOME | End: 2025-03-04
Payer: COMMERCIAL

## 2025-03-04 PROCEDURE — 97110 THERAPEUTIC EXERCISES: CPT | Performed by: OCCUPATIONAL THERAPIST

## 2025-03-04 NOTE — PROGRESS NOTES
OCCUPATIONAL THERAPY DAILY TREATMENT NOTE/DISCHARGE SUMMARY  [facility]  [facility]  [address] [facility] [address]  Dept: [phone]  Loc: [phone]   [facility] OT Fax: [phone]    Date:  3/4/2025   Initial Evaluation Date: 24                                Evaluating Therapist: Akua Dean OT     Patient Name:  Cheikh Freedman                  :  1990     Restrictions/Precautions:  Follow bilateral distal radius ORIF protocol, follow R olecranon ORIF protocol, bilat conservative distal ulna fx protocol, WB BUEs up to 20#  Diagnosis:  L Distal Radius ORIF (Dorsal Spanning Plate), R Distal Radius ORIF, & R Olecranon ORIF  S52.501A (ICD-10-CM) - Unspecified fracture of the lower end of right radius, initial encounter for closed fracture   S52.502A (ICD-10-CM) - Unspecified fracture of the lower end of left radius, initial encounter for closed fracture   S52.601A (ICD-10-CM) - Unspecified fracture of lower end of right ulna, initial encounter for closed fracture   S52.602A (ICD-10-CM) - Unspecified fracture of lower end of left ulna, initial encounter for closed fracture   S52.021A (ICD-10-CM) - Displaced fracture of olecranon process without intraarticular extension of right ulna, initial encounter for closed fracture   Date of Surgery/Injury: 24 & 12/10/24    Insurance/Certification information: [facility] PAYER [id number] (40 visits combined, used 6 as of )  Plan of care signed (Y/N): Y- signed electronically  Visit# / total visits:  (18 already completed     Referring Practitioner: Dr. Lucero  Specific Practitioner Orders: Frequency 2-3 times per week for 6-8 weeks or per therapist discretion  PROM, AAROM, AROM, Stretching, Scar Mgmt, Strengthening, and modalities as needed      Splint needed: Left munster, right volar wrist splint     Assessment of current deficits   [x] ADLs           [x] Strength  [x] IADLs           [x] Endurance     [x] Edema

## 2025-03-05 NOTE — PROGRESS NOTES
Phone: [phone] Fax: [phone]     Occupational Therapy   Cancellation/No-show Note      Patient Name:  Cheikh Freedman  : 1990  Date:  2025  MRN: [id number]    Pt's wife, Smiley, LVM that pt's surgery for the L wrist has been moved to  with a post-op folow-up date on . Will follow pt's status and anticipate receiving new script for skilled OT services s/p sx intervention.     Monika Silva MS, OTR/L #[id number]

## 2025-03-06 ENCOUNTER — APPOINTMENT (OUTPATIENT)
Dept: OCCUPATIONAL THERAPY | Age: 35
End: 2025-03-06
Payer: COMMERCIAL

## 2025-03-11 ENCOUNTER — APPOINTMENT (OUTPATIENT)
Dept: OCCUPATIONAL THERAPY | Age: 35
End: 2025-03-11
Payer: COMMERCIAL

## 2025-03-13 ENCOUNTER — APPOINTMENT (OUTPATIENT)
Dept: OCCUPATIONAL THERAPY | Age: 35
End: 2025-03-13
Payer: COMMERCIAL

## 2025-03-18 ENCOUNTER — APPOINTMENT (OUTPATIENT)
Dept: OCCUPATIONAL THERAPY | Age: 35
End: 2025-03-18
Payer: COMMERCIAL

## 2025-03-20 ENCOUNTER — APPOINTMENT (OUTPATIENT)
Dept: OCCUPATIONAL THERAPY | Age: 35
End: 2025-03-20
Payer: COMMERCIAL

## 2025-03-27 ENCOUNTER — HOSPITAL ENCOUNTER (OUTPATIENT)
Dept: OCCUPATIONAL THERAPY | Age: 35
Setting detail: THERAPIES SERIES
Discharge: HOME | End: 2025-03-27
Payer: COMMERCIAL

## 2025-03-27 PROCEDURE — 97110 THERAPEUTIC EXERCISES: CPT | Performed by: OCCUPATIONAL THERAPIST

## 2025-03-27 PROCEDURE — 97760 ORTHOTIC MGMT&TRAING 1ST ENC: CPT | Performed by: OCCUPATIONAL THERAPIST

## 2025-03-27 NOTE — PROGRESS NOTES
OCCUPATIONAL THERAPY EVALUATION/Splint Application Only   Phone: [phone]  Fax: [phone]     Date:  3/27/2025      Patient Name:  Cheikh Freedman    :  1990    Restrictions/Precautions:  Per DRUJ Instability Stabilization protocol (no rotation for 4-6 weeks), low fall risk  Diagnosis:  DRUJ Instability Stabilization and Distal Radius Bone Grafting  S52.501A (ICD-10-CM) - Unspecified fracture of the lower end of right radius, initial encounter for closed fracture   S52.502A (ICD-10-CM) - Unspecified fracture of the lower end of left radius, initial encounter for closed fracture  S52.601A (ICD-10-CM) - Unspecified fracture of lower end of right ulna, initial encounter for closed fracture   S52.602A (ICD-10-CM) - Unspecified fracture of lower end of left ulna, initial encounter for closed fracture   M25.339 (ICD-10-CM) - Other instability, unspecified wrist   Date of Surgery/Injury: 3/20/2025 sx    Insurance/Certification information: [facility]  Plan of care signed (Y/N): N  Visit# / total visits:     Referring Practitioner:  Dr. Tavon Lucero DO  Specific Practitioner Orders: Splint needed: [place] splint. Patient is okay for flexion/extension of the wrist, limit supination/pronation of the wrist for 4-6 weeks. PROM, AAROM, AROM, stretching, scar management, strengthening, and modalities PRN    Past Medical History:   Past Medical History:   Diagnosis Date    Asthma      Past Surgical History:   Past Surgical History:   Procedure Laterality Date    ELBOW SURGERY Bilateral 2024    Bilateral Distal Radius Incision and Drainage performed by Giacomo Norman DO [facility]    FOREARM SURGERY Bilateral 2024    I&D OF BILATERAL OPEN DISTAL RADIUS FRACTURES WITH CLOSED REDUCTION OF RIGHT DISTAL RADIUS FRACTURE AS WELL AS EXTERNAL FIXATION OF LEFT DISTAL RADIUS FRACTURE AND SPLINT APPLICATION OF RIGHT OLECRANON FRACTURE performed by Mehdi Marinelli DO at [facility]    FOREARM SURGERY Left 2024

## 2025-04-08 ENCOUNTER — HOSPITAL ENCOUNTER (OUTPATIENT)
Dept: OCCUPATIONAL THERAPY | Age: 35
Setting detail: THERAPIES SERIES
Discharge: HOME | End: 2025-04-08
Payer: COMMERCIAL

## 2025-04-08 PROCEDURE — 97140 MANUAL THERAPY 1/> REGIONS: CPT | Performed by: OCCUPATIONAL THERAPIST

## 2025-04-08 PROCEDURE — 97165 OT EVAL LOW COMPLEX 30 MIN: CPT | Performed by: OCCUPATIONAL THERAPIST

## 2025-04-08 NOTE — PROGRESS NOTES
OCCUPATIONAL THERAPY INITIAL EVALUATION    [facility]  SHELDON OCCUPATIONAL THERAPY  [address] [facility] [address]  Dept: [phone]  Loc: [phone]   [facility] OT Fax: [phone]    Date:  2025  Initial Evaluation Date: 2025   Evaluating Therapist: Monika Silva OT    Patient Name:  Cheikh Freedman    :  1990    Restrictions/Precautions:  Per DRUJ Instability Stabilization protocol (no rotation for 4-6 weeks), low fall risk  Diagnosis:  DRUJ Instability Stabilization and Distal Radius Bone Grafting  S52.501A (ICD-10-CM) - Unspecified fracture of the lower end of right radius, initial encounter for closed fracture   S52.502A (ICD-10-CM) - Unspecified fracture of the lower end of left radius, initial encounter for closed fracture  S52.601A (ICD-10-CM) - Unspecified fracture of lower end of right ulna, initial encounter for closed fracture   S52.602A (ICD-10-CM) - Unspecified fracture of lower end of left ulna, initial encounter for closed fracture   M25.339 (ICD-10-CM) - Other instability, unspecified wrist     Date of Surgery/Injury: 3/20/2025 sx     Insurance/Certification information: [facility] - Auth Required  Plan of care signed (Y/N): N  Visit# / total visits:      Referring Practitioner:  Dr. Tavon Lucero DO  Specific Practitioner Orders: Splint needed: [place] splint. Patient is okay for flexion/extension of the wrist, limit supination/pronation of the wrist for 4-6 weeks. PROM, AAROM, AROM, stretching, scar management, strengthening, and modalities PRN    Assessment of current deficits   [x] ADLs  [x] IADLs  [x] Strength   [x] ROM  [x] Pain  [x] Sensation    [x]Fine Motor Coordination [x] Edema   [x] Scar Adhesion/Skin Integrity   [x] Motor Endurance     OT PLAN OF CARE   OT POC based on physician orders, patient diagnosis and results of clinical assessment.    Frequency/Duration: 1-2x/wk for 18 visits  /  Certification Period From: 2025  To:

## 2025-04-15 ENCOUNTER — HOSPITAL ENCOUNTER (OUTPATIENT)
Dept: OCCUPATIONAL THERAPY | Age: 35
Setting detail: THERAPIES SERIES
Discharge: HOME | End: 2025-04-15
Payer: COMMERCIAL

## 2025-04-15 PROCEDURE — 97110 THERAPEUTIC EXERCISES: CPT | Performed by: OCCUPATIONAL THERAPIST

## 2025-04-15 PROCEDURE — 97140 MANUAL THERAPY 1/> REGIONS: CPT | Performed by: OCCUPATIONAL THERAPIST

## 2025-04-15 NOTE — PROGRESS NOTES
OCCUPATIONAL THERAPY DAILY TREATMENT NOTE  Y [facility]  SHELDON OCCUPATIONAL THERAPY  [address] [facility] [address]  Dept: [phone]  Loc: [phone]   [facility] OT Fax: [phone]    Date:  4/15/2025   Initial Evaluation Date: 2025                              Evaluating Therapist: Monika Silva OT     Patient Name:  Cheikh Freedman                  :  1990     Restrictions/Precautions:  Per DRUJ Instability Stabilization protocol (no rotation for 4-6 weeks), low fall risk  Diagnosis:  DRUJ Instability Stabilization and Distal Radius Bone Grafting  S52.501A (ICD-10-CM) - Unspecified fracture of the lower end of right radius, initial encounter for closed fracture   S52.502A (ICD-10-CM) - Unspecified fracture of the lower end of left radius, initial encounter for closed fracture  S52.601A (ICD-10-CM) - Unspecified fracture of lower end of right ulna, initial encounter for closed fracture   S52.602A (ICD-10-CM) - Unspecified fracture of lower end of left ulna, initial encounter for closed fracture   M25.339 (ICD-10-CM) - Other instability, unspecified wrist     Date of Surgery/Injury: 3/20/2025 sx     Insurance/Certification information: [facility] - Auth#: [id number]  Plan of care signed (Y/N): N  Visit# / total visits:  Authorized until      Referring Practitioner:  Dr. Tavon Lucero DO  Specific Practitioner Orders: Splint needed: [place] splint. Patient is okay for flexion/extension of the wrist, limit supination/pronation of the wrist for 4-6 weeks. PROM, AAROM, AROM, stretching, scar management, strengthening, and modalities PRN     Assessment of current deficits   [x] ADLs          [x] IADLs         [x] Strength      [x] ROM          [x] Pain            [x] Sensation     [x]Fine Motor Coordination     [x] Edema         [x] Scar Adhesion/Skin Integrity   [x] Motor Endurance      OT PLAN OF CARE   OT POC based on physician orders, patient diagnosis and results of

## 2025-04-17 ENCOUNTER — APPOINTMENT (OUTPATIENT)
Dept: OCCUPATIONAL THERAPY | Age: 35
End: 2025-04-17
Payer: COMMERCIAL

## 2025-04-29 ENCOUNTER — HOSPITAL ENCOUNTER (OUTPATIENT)
Dept: OCCUPATIONAL THERAPY | Age: 35
Setting detail: THERAPIES SERIES
Discharge: HOME | End: 2025-04-29
Payer: COMMERCIAL

## 2025-04-29 PROCEDURE — 97140 MANUAL THERAPY 1/> REGIONS: CPT | Performed by: OCCUPATIONAL THERAPIST

## 2025-04-29 PROCEDURE — 97110 THERAPEUTIC EXERCISES: CPT | Performed by: OCCUPATIONAL THERAPIST

## 2025-04-29 NOTE — PROGRESS NOTES
OCCUPATIONAL THERAPY DAILY TREATMENT NOTE  Y [facility]  SHELDON OCCUPATIONAL THERAPY  [address] [facility] [address]  Dept: [phone]  Loc: [phone]   [facility] OT Fax: [phone]    Date:  2025   Initial Evaluation Date: 2025                              Evaluating Therapist: Monika Silva OT     Patient Name:  Cheikh Freedman                  :  1990     Restrictions/Precautions:  Per DRUJ Instability Stabilization protocol (no rotation for 4-6 weeks), low fall risk  Diagnosis:  DRUJ Instability Stabilization and Distal Radius Bone Grafting  S52.501A (ICD-10-CM) - Unspecified fracture of the lower end of right radius, initial encounter for closed fracture   S52.502A (ICD-10-CM) - Unspecified fracture of the lower end of left radius, initial encounter for closed fracture  S52.601A (ICD-10-CM) - Unspecified fracture of lower end of right ulna, initial encounter for closed fracture   S52.602A (ICD-10-CM) - Unspecified fracture of lower end of left ulna, initial encounter for closed fracture   M25.339 (ICD-10-CM) - Other instability, unspecified wrist     Date of Surgery/Injury: 3/20/2025 sx     Insurance/Certification information: [facility] - Auth#: [id number]  Plan of care signed (Y/N): N  Visit# / total visits:  Authorized until      Referring Practitioner:  Dr. Tavon Lucero DO  Specific Practitioner Orders: Splint needed: [place] splint. Patient is okay for flexion/extension of the wrist, limit supination/pronation of the wrist for 4-6 weeks. PROM, AAROM, AROM, stretching, scar management, strengthening, and modalities PRN     Assessment of current deficits   [x] ADLs          [x] IADLs         [x] Strength      [x] ROM          [x] Pain            [x] Sensation     [x]Fine Motor Coordination     [x] Edema         [x] Scar Adhesion/Skin Integrity   [x] Motor Endurance      OT PLAN OF CARE   OT POC based on physician orders, patient diagnosis and results of

## 2025-05-01 ENCOUNTER — HOSPITAL ENCOUNTER (OUTPATIENT)
Dept: OCCUPATIONAL THERAPY | Age: 35
Setting detail: THERAPIES SERIES
Discharge: HOME | End: 2025-05-01

## 2025-05-01 NOTE — PROGRESS NOTES
Phone: [phone] Fax: [phone]    Occupational Therapy   Cancellation/No-show Note     Patient Name:  Cheikh Freedman  : 1990  Date:  2025  MRN: [id number]    For today's appointment patient:       [x]  Cancelled   []  Rescheduled appointment   []  No-show     Reason given by patient:   []  Patient ill   []  Conflicting appointment   []  No transportation   []  Conflict with work   []  No reason given   [x]  Other:     Comments: Reduced to 1x/wk with HEP for AROM d/t limited authorized sessions. Will increase to 2x/wk when we can begin strengthening.    Electronically signed by: CIERRA Muñiz/L, MS, #[id number]

## 2025-05-07 ENCOUNTER — HOSPITAL ENCOUNTER (OUTPATIENT)
Dept: OCCUPATIONAL THERAPY | Age: 35
Setting detail: THERAPIES SERIES
Discharge: HOME | End: 2025-05-07
Payer: COMMERCIAL

## 2025-05-07 PROCEDURE — 97110 THERAPEUTIC EXERCISES: CPT | Performed by: OCCUPATIONAL THERAPIST

## 2025-05-07 PROCEDURE — 97140 MANUAL THERAPY 1/> REGIONS: CPT | Performed by: OCCUPATIONAL THERAPIST

## 2025-05-07 NOTE — PROGRESS NOTES
OCCUPATIONAL THERAPY DAILY TREATMENT NOTE  Y [facility]  SHELDON OCCUPATIONAL THERAPY  [address] [facility] [address]  Dept: [phone]  Loc: [phone]   [facility] OT Fax: [phone]    Date:  2025   Initial Evaluation Date: 2025                              Evaluating Therapist: Monika Silva OT     Patient Name:  Cheikh Freedman                  :  1990     Restrictions/Precautions:  Per DRUJ Instability Stabilization protocol (no rotation for 4-6 weeks), low fall risk  Diagnosis:  DRUJ Instability Stabilization and Distal Radius Bone Grafting  S52.501A (ICD-10-CM) - Unspecified fracture of the lower end of right radius, initial encounter for closed fracture   S52.502A (ICD-10-CM) - Unspecified fracture of the lower end of left radius, initial encounter for closed fracture  S52.601A (ICD-10-CM) - Unspecified fracture of lower end of right ulna, initial encounter for closed fracture   S52.602A (ICD-10-CM) - Unspecified fracture of lower end of left ulna, initial encounter for closed fracture   M25.339 (ICD-10-CM) - Other instability, unspecified wrist     Date of Surgery/Injury: 3/20/2025 sx     Insurance/Certification information: [facility] - Auth#: [id number]  Plan of care signed (Y/N): N  Visit# / total visits: 3 / 13 Authorized until      Referring Practitioner:  Dr. Tavon Lucero DO  Specific Practitioner Orders: Splint needed: [place] splint. Patient is okay for flexion/extension of the wrist, limit supination/pronation of the wrist for 4-6 weeks. PROM, AAROM, AROM, stretching, scar management, strengthening, and modalities PRN     Assessment of current deficits   [x] ADLs          [x] IADLs         [x] Strength      [x] ROM          [x] Pain            [x] Sensation     [x]Fine Motor Coordination     [x] Edema         [x] Scar Adhesion/Skin Integrity   [x] Motor Endurance      OT PLAN OF CARE   OT POC based on physician orders, patient diagnosis and results of

## 2025-05-14 ENCOUNTER — HOSPITAL ENCOUNTER (OUTPATIENT)
Dept: OCCUPATIONAL THERAPY | Age: 35
Setting detail: THERAPIES SERIES
Discharge: HOME | End: 2025-05-14
Payer: COMMERCIAL

## 2025-05-14 PROCEDURE — 97140 MANUAL THERAPY 1/> REGIONS: CPT

## 2025-05-14 PROCEDURE — 97018 PARAFFIN BATH THERAPY: CPT

## 2025-05-14 PROCEDURE — 97110 THERAPEUTIC EXERCISES: CPT

## 2025-05-14 NOTE — PROGRESS NOTES
splint w/ Green sponge 2x15  35# Hand gripper 3 positions: sup, pro, neutral x 20 ea  50# Hand gripper neutral x 15  Orange digit extender x20  3# ball pick ups alt opp x 8 ea digit  In hand manipulation of 1# weighted disc until fatigue         Other:     Skilled Judgement & Intervention X  Provided in selection of appropriate interventions w/ pt education in proper ex tech and purpose for ex's, correct performance of therapeutic ex was facilitated w/ verbal and visual cueing.          Assessment/Comments: Arrives to session with no splint donned, reports only wearing at night. AROM & strength measurements taken at beginning of session. Slight improvements in active wrist flexion & extension--harder end feel with passive flexion. Place & holds implemented in effort to maximize AROM. Strength assessed today with pt demonstrating significant  strength deficit in non dominant L hand. Intervention targeted hand strength as pt reports Good tolerance to putty program--Fair+ namrata today with resistive hand exercise, one rest break needed d/t fatigue. Next session D/C splint as appropriate.      IE 4/29 5/7 5/14   Forearm Pronation: 0/76-84* NT  WNLs        Supination: 0/80* NT 28*  54*  55*        IE 4/15 4/29 5/14   Wrist Flexion: 0/60-80* 30*  (35*)  40*  46*  52*     Extension: 0/60-75* 24*  (40*)  45*  44* 49*     Radial Deviation: 0/20-25* 5*   21* 25*     Ulnar Deviation: 0/30-45* 26*   20* 20*        Dynamometer (setting 2) IE  5/14     Left # 45#      Right # 110#      Pinch (lateral)         Left #  24#     Right #   30#     Pinch (tripod)         Left #   12#     Right #   24#       -Rehab Potential: Good   -Response to Treatment: Good    Patient. Education:  [x] Plans/Goals, Risks/Benefits discussed  [x] Home exercise program  Method of Education: [x] Verbal  [x] Demo  [] Written  Comprehension of Education:  [x] Verbalizes understanding.  [x] Demonstrates understanding.  [] Needs Review.  []

## 2025-05-20 ENCOUNTER — HOSPITAL ENCOUNTER (OUTPATIENT)
Dept: OCCUPATIONAL THERAPY | Age: 35
Setting detail: THERAPIES SERIES
Discharge: HOME | End: 2025-05-20
Payer: COMMERCIAL

## 2025-05-20 PROCEDURE — 97110 THERAPEUTIC EXERCISES: CPT | Performed by: OCCUPATIONAL THERAPIST

## 2025-05-20 PROCEDURE — 97140 MANUAL THERAPY 1/> REGIONS: CPT | Performed by: OCCUPATIONAL THERAPIST

## 2025-05-20 NOTE — PROGRESS NOTES
OCCUPATIONAL THERAPY DAILY TREATMENT NOTE  Y [facility]  SHELDON OCCUPATIONAL THERAPY  [address] [facility] [address]  Dept: [phone]  Loc: [phone]   [facility] OT Fax: [phone]    Date:  2025   Initial Evaluation Date: 2025                              Evaluating Therapist: Monika Silva OT     Patient Name:  Cheikh Freedman                  :  1990     Restrictions/Precautions:  Per DRUJ Instability Stabilization protocol (no rotation for 4-6 weeks), low fall risk  Diagnosis:  DRUJ Instability Stabilization and Distal Radius Bone Grafting  S52.501A (ICD-10-CM) - Unspecified fracture of the lower end of right radius, initial encounter for closed fracture   S52.502A (ICD-10-CM) - Unspecified fracture of the lower end of left radius, initial encounter for closed fracture  S52.601A (ICD-10-CM) - Unspecified fracture of lower end of right ulna, initial encounter for closed fracture   S52.602A (ICD-10-CM) - Unspecified fracture of lower end of left ulna, initial encounter for closed fracture   M25.339 (ICD-10-CM) - Other instability, unspecified wrist     Date of Surgery/Injury: 3/20/2025 sx     Insurance/Certification information: [facility] - Auth#: [id number]  Plan of care signed (Y/N): N  Visit# / total visits:  Authorized until      Referring Practitioner:  Dr. Tavon Lucero DO  Specific Practitioner Orders: Splint needed: [place] splint. Patient is okay for flexion/extension of the wrist, limit supination/pronation of the wrist for 4-6 weeks. PROM, AAROM, AROM, stretching, scar management, strengthening, and modalities PRN     Assessment of current deficits   [x] ADLs          [x] IADLs         [x] Strength      [x] ROM          [x] Pain            [x] Sensation     [x]Fine Motor Coordination     [x] Edema         [x] Scar Adhesion/Skin Integrity   [x] Motor Endurance      OT PLAN OF CARE   OT POC based on physician orders, patient diagnosis and results of

## 2025-05-23 ENCOUNTER — HOSPITAL ENCOUNTER (OUTPATIENT)
Dept: OCCUPATIONAL THERAPY | Age: 35
Setting detail: THERAPIES SERIES
Discharge: HOME | End: 2025-05-23
Payer: COMMERCIAL

## 2025-05-23 PROCEDURE — 97140 MANUAL THERAPY 1/> REGIONS: CPT | Performed by: OCCUPATIONAL THERAPIST

## 2025-05-23 PROCEDURE — 97035 APP MDLTY 1+ULTRASOUND EA 15: CPT | Performed by: OCCUPATIONAL THERAPIST

## 2025-05-23 PROCEDURE — 97110 THERAPEUTIC EXERCISES: CPT | Performed by: OCCUPATIONAL THERAPIST

## 2025-05-23 NOTE — PROGRESS NOTES
OCCUPATIONAL THERAPY DAILY TREATMENT NOTE  Y [facility]  [facility]  [address] [facility] [address]  Dept: [phone]  Loc: [phone]   [facility] OT Fax: [phone]    Date:  2025   Initial Evaluation Date: 2025                              Evaluating Therapist: Monika Silva OT     Patient Name:  Cheikh Freedman                  :  1990     Restrictions/Precautions:  Per DRUJ Instability Stabilization protocol (no rotation for 4-6 weeks), low fall risk  Diagnosis:  DRUJ Instability Stabilization and Distal Radius Bone Grafting  S52.501A (ICD-10-CM) - Unspecified fracture of the lower end of right radius, initial encounter for closed fracture   S52.502A (ICD-10-CM) - Unspecified fracture of the lower end of left radius, initial encounter for closed fracture  S52.601A (ICD-10-CM) - Unspecified fracture of lower end of right ulna, initial encounter for closed fracture   S52.602A (ICD-10-CM) - Unspecified fracture of lower end of left ulna, initial encounter for closed fracture   M25.339 (ICD-10-CM) - Other instability, unspecified wrist     Date of Surgery/Injury: 3/20/2025 sx     Insurance/Certification information: [facility] - Auth#: [id number]  Plan of care signed (Y/N): N  Visit# / total visits:  Authorized until      Referring Practitioner:  Dr. Tavon Lucero DO  Specific Practitioner Orders: Splint needed: [place] splint. Patient is okay for flexion/extension of the wrist, limit supination/pronation of the wrist for 4-6 weeks. PROM, AAROM, AROM, stretching, scar management, strengthening, and modalities PRN     Assessment of current deficits   [x] ADLs          [x] IADLs         [x] Strength      [x] ROM          [x] Pain            [x] Sensation     [x]Fine Motor Coordination     [x] Edema         [x] Scar Adhesion/Skin Integrity   [x] Motor Endurance      OT PLAN OF CARE   OT POC based on physician orders, patient diagnosis and results of

## 2025-05-29 ENCOUNTER — HOSPITAL ENCOUNTER (OUTPATIENT)
Dept: OCCUPATIONAL THERAPY | Age: 35
Setting detail: THERAPIES SERIES
Discharge: HOME | End: 2025-05-29
Payer: COMMERCIAL

## 2025-05-29 PROCEDURE — 97140 MANUAL THERAPY 1/> REGIONS: CPT

## 2025-05-29 PROCEDURE — 97110 THERAPEUTIC EXERCISES: CPT

## 2025-05-29 NOTE — PROGRESS NOTES
their splint wear, care, and precautions if needed.   7) Patient will be knowledgeable of edema control techniques as evident with decreases from moderate/mild to slight/none.   8) Patient will be knowledgeable of scar management techniques as evident in decreases of density/tenderness from mild to slight/none.   9) Patient will report ADL/IADL functions as Mod I/I using L % of the time.   10) Patient will decrease QuickDASH score to 30% or less for increased participation in daily functional activities.     TODAY'S TREATMENT     Pain Level: 5-6/10 pain in dorsal/radial wrist, throbbing    Subjective:  Pt reports increased pain today stating a recent spike in stiffness     Objective:  Updated POC to be completed by 8th session.  INTERVENTION: COMPLETED: SPECIFICS/COMMENTS:   Modality:     Paraffin   Completed to increase jnt mobility and to aide in scar remolding and to increase jnt mobility during PROM   Fluidotherapy X  While engaging in AROM exercise to ^ tissue extensibility, reduce pain   US  8 mins, 3.3 MHz, 0.8 intensity with continuous frequency    AROM/AAROM:     L Wrist              Active flex/ext x 20 with 3 sec end range holds and forearm blocked in protected position  Sup/pro actively in protected position x 20, + to HEP.  Sup/pro with flexbar x 20  Sup/pro with wheel x 20  Boading balls x 20 CW and 20 CCW  Jux-a-sizer: 6 reps with elbow on table 75% of time  AA wrist flex/ext w/ place & holds x 10 ea        PROM/Stretching:     L Wrist  Wrist flex/ext, gentle sup/pro and RD/UD while in protected plane within pain namrata  Supination towel stretch x 10 with 10 sec holds  Sup/Pro stick 2 weighted discs x20        Scar Mass/Edema Control:     Scar Tissue  Cross friction to all sites   Soft Tissue   X  To increase soft tissue and joint mobility at the level of the wrist  To anterior/posterior forearm   Strengthening/TE/TA:     L Hand/Digital                   X                     X   X   X   X   X   X

## 2025-06-02 ENCOUNTER — HOSPITAL ENCOUNTER (OUTPATIENT)
Dept: OCCUPATIONAL THERAPY | Age: 35
Setting detail: THERAPIES SERIES
Discharge: HOME | End: 2025-06-02
Payer: COMMERCIAL

## 2025-06-02 PROCEDURE — 97110 THERAPEUTIC EXERCISES: CPT

## 2025-06-02 PROCEDURE — 97140 MANUAL THERAPY 1/> REGIONS: CPT

## 2025-06-02 PROCEDURE — 97530 THERAPEUTIC ACTIVITIES: CPT

## 2025-06-02 NOTE — PROGRESS NOTES
2x15  Flatten Blue putty with 2# ball  Flat fist static holds 5# bag weight 1 min, 3x  Blue putty lumbrical grasp and grasp + pull apart        Other:     Skilled Judgement & Intervention X  Provided in selection of appropriate interventions w/ pt education in proper ex tech and purpose for ex's, correct performance of therapeutic ex was facilitated w/ verbal and visual cueing.   Progress update/TA X  Taking measurements, analysis of performance, collaboration of goals     Assessment/Comments: Progress update completed for reporting period 4/8/25- 6/2/25 with interventions including modalities, therapeutic exercise, therapeutic activity, and manual techniques. Pt demonstrates improvements with AROM wrist/forearm, /pinch strength, and coordination skills. Despite gains, pt reports reduced strength and endurance limits participation in activities like lifting items (dishes, weed daiana). Additionally, pt wants to return to work full duty which requires a lot of heavy lifting and use of various weighed tools. Pt no longer has any restrictions per pt report as pt had ortho Appt this past Friday. Pt states he wants to continue to work on strength and plans to undergo a FCE in 6-8 weeks. Pt would benefit from skilled OT to address pain, strength, and endurance for safe return to prior level of function.             IE 4/29 5/7 5/14 6/2   Forearm Pronation: 0/76-84* NT  WNLs     --     Supination: 0/80* NT 28*  54*  55* 68*          IE 4/15 4/29 5/14 6/2   Wrist Flexion: 0/60-80* 30*  (35*)  40*  46*  52* 66*     Extension: 0/60-75* 24*  (40*)  45*  44* 49* 52*     Radial Deviation: 0/20-25* 5*    21* 25* 30*     Ulnar Deviation: 0/30-45* 26*    20* 20* 25*         Dynamometer (setting 2) IE  5/14 6/2   Left # 45#  70#    Right # 110#   --   Pinch (lateral)         Left #  24# 25#    Right #   30#  --   Pinch (tripod)         Left #   12#  17#   Right #   24#  --     Edema Description/Circumferential Measurements:

## 2025-06-06 ENCOUNTER — HOSPITAL ENCOUNTER (OUTPATIENT)
Dept: OCCUPATIONAL THERAPY | Age: 35
Setting detail: THERAPIES SERIES
Discharge: HOME | End: 2025-06-06
Payer: COMMERCIAL

## 2025-06-06 PROCEDURE — 97140 MANUAL THERAPY 1/> REGIONS: CPT | Performed by: OCCUPATIONAL THERAPIST

## 2025-06-06 PROCEDURE — 97110 THERAPEUTIC EXERCISES: CPT | Performed by: OCCUPATIONAL THERAPIST

## 2025-06-06 NOTE — PROGRESS NOTES
OCCUPATIONAL THERAPY DAILY TREATMENT NOTE  Y [facility]  [facility]  [address] [facility] [address]  Dept: [phone]  Loc: [phone]   [facility] OT Fax: [phone]    Date:  2025   Initial Evaluation Date: 2025                              Evaluating Therapist: Monika Silva OT     Patient Name:  Cheikh Freemdan                  :  1990     Restrictions/Precautions:  Per DRUJ Instability Stabilization protocol (no rotation for 4-6 weeks), low fall risk  Diagnosis:  DRUJ Instability Stabilization and Distal Radius Bone Grafting  S52.501A (ICD-10-CM) - Unspecified fracture of the lower end of right radius, initial encounter for closed fracture   S52.502A (ICD-10-CM) - Unspecified fracture of the lower end of left radius, initial encounter for closed fracture  S52.601A (ICD-10-CM) - Unspecified fracture of lower end of right ulna, initial encounter for closed fracture   S52.602A (ICD-10-CM) - Unspecified fracture of lower end of left ulna, initial encounter for closed fracture   M25.339 (ICD-10-CM) - Other instability, unspecified wrist     Date of Surgery/Injury: 3/20/2025 sx     Insurance/Certification information: [facility] - Auth#: [id number]  Plan of care signed (Y/N): N  Visit# / total visits:  Authorized until      Referring Practitioner:  Dr. Tavon Lucero DO  Specific Practitioner Orders: Splint needed: [place] splint. Patient is okay for flexion/extension of the wrist, limit supination/pronation of the wrist for 4-6 weeks. PROM, AAROM, AROM, stretching, scar management, strengthening, and modalities PRN     Assessment of current deficits   [x] ADLs          [x] IADLs         [x] Strength      [x] ROM          [x] Pain            [x] Sensation     [x]Fine Motor Coordination     [x] Edema         [x] Scar Adhesion/Skin Integrity   [x] Motor Endurance      OT PLAN OF CARE   OT POC based on physician orders, patient diagnosis and results of

## 2025-06-09 ENCOUNTER — HOSPITAL ENCOUNTER (OUTPATIENT)
Dept: OCCUPATIONAL THERAPY | Age: 35
Setting detail: THERAPIES SERIES
Discharge: HOME | End: 2025-06-09
Payer: COMMERCIAL

## 2025-06-09 PROCEDURE — 97140 MANUAL THERAPY 1/> REGIONS: CPT | Performed by: OCCUPATIONAL THERAPIST

## 2025-06-09 PROCEDURE — 97035 APP MDLTY 1+ULTRASOUND EA 15: CPT | Performed by: OCCUPATIONAL THERAPIST

## 2025-06-09 PROCEDURE — 97110 THERAPEUTIC EXERCISES: CPT | Performed by: OCCUPATIONAL THERAPIST

## 2025-06-09 NOTE — PROGRESS NOTES
OCCUPATIONAL THERAPY DAILY TREATMENT NOTE  Y [facility]  [facility]  [address] [facility] [address]  Dept: [phone]  Loc: [phone]   [facility] OT Fax: [phone]    Date:  2025   Initial Evaluation Date: 2025                              Evaluating Therapist: Monika Silva OT     Patient Name:  Cheikh Freedman                  :  1990     Restrictions/Precautions:  Per DRUJ Instability Stabilization protocol (no rotation for 4-6 weeks), low fall risk  Diagnosis:  DRUJ Instability Stabilization and Distal Radius Bone Grafting  S52.501A (ICD-10-CM) - Unspecified fracture of the lower end of right radius, initial encounter for closed fracture   S52.502A (ICD-10-CM) - Unspecified fracture of the lower end of left radius, initial encounter for closed fracture  S52.601A (ICD-10-CM) - Unspecified fracture of lower end of right ulna, initial encounter for closed fracture   S52.602A (ICD-10-CM) - Unspecified fracture of lower end of left ulna, initial encounter for closed fracture   M25.339 (ICD-10-CM) - Other instability, unspecified wrist     Date of Surgery/Injury: 3/20/2025 sx     Insurance/Certification information: [facility] - Auth#: [id number]  Plan of care signed (Y/N): N  Visit# / total visits: 10 / 13 Authorized until      Referring Practitioner:  Dr. Tavon Lucero DO  Specific Practitioner Orders: Splint needed: [place] splint. Patient is okay for flexion/extension of the wrist, limit supination/pronation of the wrist for 4-6 weeks. PROM, AAROM, AROM, stretching, scar management, strengthening, and modalities PRN     Assessment of current deficits   [x] ADLs          [x] IADLs         [x] Strength      [x] ROM          [x] Pain            [x] Sensation     [x]Fine Motor Coordination     [x] Edema         [x] Scar Adhesion/Skin Integrity   [x] Motor Endurance      OT PLAN OF CARE   OT POC based on physician orders, patient diagnosis and results of

## 2025-06-11 ENCOUNTER — HOSPITAL ENCOUNTER (OUTPATIENT)
Dept: OCCUPATIONAL THERAPY | Age: 35
Setting detail: THERAPIES SERIES
Discharge: HOME | End: 2025-06-11
Payer: COMMERCIAL

## 2025-06-11 PROCEDURE — 97110 THERAPEUTIC EXERCISES: CPT | Performed by: OCCUPATIONAL THERAPIST

## 2025-06-11 PROCEDURE — 97035 APP MDLTY 1+ULTRASOUND EA 15: CPT | Performed by: OCCUPATIONAL THERAPIST

## 2025-06-11 PROCEDURE — 97140 MANUAL THERAPY 1/> REGIONS: CPT | Performed by: OCCUPATIONAL THERAPIST

## 2025-06-11 NOTE — PROGRESS NOTES
OCCUPATIONAL THERAPY DAILY TREATMENT NOTE  Y [facility]  [facility]  [address] [facility] [address]  Dept: [phone]  Loc: [phone]   [facility] OT Fax: [phone]    Date:  2025   Initial Evaluation Date: 2025                              Evaluating Therapist: Monika Silva OT     Patient Name:  Cheikh Freedman                  :  1990     Restrictions/Precautions:  Per DRUJ Instability Stabilization protocol (no rotation for 4-6 weeks), low fall risk  Diagnosis:  DRUJ Instability Stabilization and Distal Radius Bone Grafting  S52.501A (ICD-10-CM) - Unspecified fracture of the lower end of right radius, initial encounter for closed fracture   S52.502A (ICD-10-CM) - Unspecified fracture of the lower end of left radius, initial encounter for closed fracture  S52.601A (ICD-10-CM) - Unspecified fracture of lower end of right ulna, initial encounter for closed fracture   S52.602A (ICD-10-CM) - Unspecified fracture of lower end of left ulna, initial encounter for closed fracture   M25.339 (ICD-10-CM) - Other instability, unspecified wrist     Date of Surgery/Injury: 3/20/2025 sx     Insurance/Certification information: [facility] - Auth#: [id number]  Plan of care signed (Y/N): N  Visit# / total visits:  Authorized until      Referring Practitioner:  Dr. Tavon Lucero DO  Specific Practitioner Orders: Splint needed: [place] splint. Patient is okay for flexion/extension of the wrist, limit supination/pronation of the wrist for 4-6 weeks. PROM, AAROM, AROM, stretching, scar management, strengthening, and modalities PRN     Assessment of current deficits   [x] ADLs          [x] IADLs         [x] Strength      [x] ROM          [x] Pain            [x] Sensation     [x]Fine Motor Coordination     [x] Edema         [x] Scar Adhesion/Skin Integrity   [x] Motor Endurance      OT PLAN OF CARE   OT POC based on physician orders, patient diagnosis and results of

## 2025-06-16 ENCOUNTER — HOSPITAL ENCOUNTER (OUTPATIENT)
Dept: OCCUPATIONAL THERAPY | Age: 35
Setting detail: THERAPIES SERIES
Discharge: HOME | End: 2025-06-16
Payer: COMMERCIAL

## 2025-06-16 PROCEDURE — 97110 THERAPEUTIC EXERCISES: CPT | Performed by: OCCUPATIONAL THERAPIST

## 2025-06-16 NOTE — PROGRESS NOTES
OCCUPATIONAL THERAPY DAILY TREATMENT NOTE  Y [facility]  [facility]  [address] [facility] [address]  Dept: [phone]  Loc: [phone]   [facility] OT Fax: [phone]    Date:  2025   Initial Evaluation Date: 2025                              Evaluating Therapist: Monika Silva OT     Patient Name:  Cheikh Freedman                  :  1990     Restrictions/Precautions:  Per DRUJ Instability Stabilization protocol (no rotation for 4-6 weeks), low fall risk  Diagnosis:  DRUJ Instability Stabilization and Distal Radius Bone Grafting  S52.501A (ICD-10-CM) - Unspecified fracture of the lower end of right radius, initial encounter for closed fracture   S52.502A (ICD-10-CM) - Unspecified fracture of the lower end of left radius, initial encounter for closed fracture  S52.601A (ICD-10-CM) - Unspecified fracture of lower end of right ulna, initial encounter for closed fracture   S52.602A (ICD-10-CM) - Unspecified fracture of lower end of left ulna, initial encounter for closed fracture   M25.339 (ICD-10-CM) - Other instability, unspecified wrist     Date of Surgery/Injury: 3/20/2025 sx     Insurance/Certification information: [facility] - Auth#: [id number]  Plan of care signed (Y/N): N  Visit# / total visits:  Authorized until      Referring Practitioner:  Dr. Tavon Lucero DO  Specific Practitioner Orders: Splint needed: [place] splint. Patient is okay for flexion/extension of the wrist, limit supination/pronation of the wrist for 4-6 weeks. PROM, AAROM, AROM, stretching, scar management, strengthening, and modalities PRN     Assessment of current deficits   [x] ADLs          [x] IADLs         [x] Strength      [x] ROM          [x] Pain            [x] Sensation     [x]Fine Motor Coordination     [x] Edema         [x] Scar Adhesion/Skin Integrity   [x] Motor Endurance      OT PLAN OF CARE   OT POC based on physician orders, patient diagnosis and results of

## 2025-06-18 ENCOUNTER — HOSPITAL ENCOUNTER (OUTPATIENT)
Dept: OCCUPATIONAL THERAPY | Age: 35
Setting detail: THERAPIES SERIES
Discharge: HOME | End: 2025-06-18
Payer: COMMERCIAL

## 2025-06-18 PROCEDURE — 97110 THERAPEUTIC EXERCISES: CPT | Performed by: OCCUPATIONAL THERAPIST

## 2025-06-18 NOTE — PROGRESS NOTES
AROM/AAROM:     L Wrist              Active flex/ext x 20 with 3 sec end range holds and forearm blocked in protected position  Sup/pro actively in protected position x 20, + to HEP.  Sup/pro with flexbar x 20  Sup/pro with wheel x 20  Boading balls x 20 CW and 20 CCW  Jux-a-sizer: 6 reps with elbow on table 75% of time  AA wrist flex/ext w/ place & holds x 10 ea        PROM/Stretching:     L Wrist  Wrist flex/ext, gentle sup/pro and RD/UD while in protected plane within pain namrata  Supination towel stretch x 10 with 10 sec holds  Sup/Pro stick 2 weighted discs x20        Scar Mass/Edema Control:     Scar Tissue  Cross friction to all sites   Soft Tissue  To increase soft tissue and joint mobility at the level of the wrist  To anterior/posterior forearm   Strengthening/TE/TA:     L Hand/Digital          X                                      X                             X          X        X        X  Light yellow putty: gripping, manipulating, rolling, alternating pinching, lat pinch, garcia pinch, and digital extension x 10 ea.  ^'d 7# Digi-flex: composite and alt x 20 ea. Added thumb x 20.  Lumbrical grasp splint w/ Green sponge 2x15  Hand gripper 3rd setting: maxed out at ^'d 110 reps  50# Hand gripper: transferring objects x 25  Orange digit extender x20  3# ball pick ups alt opp x 8 ea digit  In hand manipulation of 1# weighted disc until fatigue   15# Flexbar: 20 pronation, supination, and twisting x 20  Mr. : ^'d 4# 8 up/down  3# Wrist Flex/Ext/RD/Sup/Pro x 20 ea  In Hand sponge collection x 3, 2 green and 1 blue (crush them) x 10 rounds  Intrinsic + Squeezes x 25 with green sponge  3.3# Ball wrist flex/ext x 16 (max diff)  ^5.5# Ball sup/pro with arm extended to 90* 2x20  3 Plate Plate Sup/pro bar x 20 rotation, then x 20 CW and x 20 CCW circumduction. Added RD/UD.  3# Dowel wrist circumduction 20 CW, 20 CCW  3# dowel stabilization against forces of pressure  3# ball sup/pro 2x15  Flatten Blue putty

## 2025-06-23 ENCOUNTER — APPOINTMENT (OUTPATIENT)
Dept: OCCUPATIONAL THERAPY | Age: 35
End: 2025-06-23
Payer: COMMERCIAL

## 2025-06-24 ENCOUNTER — HOSPITAL ENCOUNTER (OUTPATIENT)
Dept: OCCUPATIONAL THERAPY | Age: 35
Setting detail: THERAPIES SERIES
Discharge: HOME | End: 2025-06-24
Payer: COMMERCIAL

## 2025-06-24 PROCEDURE — 97110 THERAPEUTIC EXERCISES: CPT | Performed by: OCCUPATIONAL THERAPIST

## 2025-06-24 NOTE — PROGRESS NOTES
OCCUPATIONAL THERAPY DAILY TREATMENT NOTE  Y [facility]  [facility]  [address] [place] [address]  Dept: [phone]  Loc: [phone]   [facility] OT Fax: [phone]    Date:  2025   Initial Evaluation Date: 2025                              Evaluating Therapist: Monika Silav OT     Patient Name:  Cheikh Freedman                  :  1990     Restrictions/Precautions:  Per DRUJ Instability Stabilization protocol (no rotation for 4-6 weeks), low fall risk  Diagnosis:  DRUJ Instability Stabilization and Distal Radius Bone Grafting  S52.501A (ICD-10-CM) - Unspecified fracture of the lower end of right radius, initial encounter for closed fracture   S52.502A (ICD-10-CM) - Unspecified fracture of the lower end of left radius, initial encounter for closed fracture  S52.601A (ICD-10-CM) - Unspecified fracture of lower end of right ulna, initial encounter for closed fracture   S52.602A (ICD-10-CM) - Unspecified fracture of lower end of left ulna, initial encounter for closed fracture   M25.339 (ICD-10-CM) - Other instability, unspecified wrist     Date of Surgery/Injury: 3/20/2025 sx     Insurance/Certification information: [facility] - Auth#: [id number], 2nd Auth #: [id number]  Plan of care signed (Y/N): Y - signed electronically  Visit# / total visits:  - Authorized until  (13 already completed)     Referring Practitioner:  Dr. Tavon Lucero DO  Specific Practitioner Orders: Splint needed: [place] splint. Patient is okay for flexion/extension of the wrist, limit supination/pronation of the wrist for 4-6 weeks. PROM, AAROM, AROM, stretching, scar management, strengthening, and modalities PRN     Assessment of current deficits   [x] ADLs          [x] IADLs         [x] Strength      [x] ROM          [x] Pain            [x] Sensation     [x]Fine Motor Coordination     [x] Edema         [x] Scar Adhesion/Skin Integrity   [x] Motor Endurance      OT PLAN OF CARE

## 2025-06-25 ENCOUNTER — APPOINTMENT (OUTPATIENT)
Dept: OCCUPATIONAL THERAPY | Age: 35
End: 2025-06-25
Payer: COMMERCIAL

## 2025-06-27 ENCOUNTER — HOSPITAL ENCOUNTER (OUTPATIENT)
Dept: OCCUPATIONAL THERAPY | Age: 35
Setting detail: THERAPIES SERIES
Discharge: HOME | End: 2025-06-27
Payer: COMMERCIAL

## 2025-06-27 PROCEDURE — 97110 THERAPEUTIC EXERCISES: CPT | Performed by: OCCUPATIONAL THERAPIST

## 2025-06-27 NOTE — PROGRESS NOTES
OCCUPATIONAL THERAPY DAILY TREATMENT NOTE  Y [facility]  [facility]  [address] [address]  Dept: [phone]  Loc: [phone]   [facility] OT Fax: [phone]    Date:  2025   Initial Evaluation Date: 2025                              Evaluating Therapist: Monika Silva OT     Patient Name:  Cheikh Freedman                  :  1990     Restrictions/Precautions:  Per DRUJ Instability Stabilization protocol (no rotation for 4-6 weeks), low fall risk  Diagnosis:  DRUJ Instability Stabilization and Distal Radius Bone Grafting  S52.501A (ICD-10-CM) - Unspecified fracture of the lower end of right radius, initial encounter for closed fracture   S52.502A (ICD-10-CM) - Unspecified fracture of the lower end of left radius, initial encounter for closed fracture  S52.601A (ICD-10-CM) - Unspecified fracture of lower end of right ulna, initial encounter for closed fracture   S52.602A (ICD-10-CM) - Unspecified fracture of lower end of left ulna, initial encounter for closed fracture   M25.339 (ICD-10-CM) - Other instability, unspecified wrist     Date of Surgery/Injury: 3/20/2025 sx     Insurance/Certification information: [facility] - Auth#: [id number], 2nd Auth #: [id number]  Plan of care signed (Y/N): Y - signed electronically  Visit# / total visits:  - Authorized until  (13 already completed)     Referring Practitioner:  Dr. Tavon Lucero DO  Specific Practitioner Orders: Splint needed: [place] splint. Patient is okay for flexion/extension of the wrist, limit supination/pronation of the wrist for 4-6 weeks. PROM, AAROM, AROM, stretching, scar management, strengthening, and modalities PRN     Assessment of current deficits   [x] ADLs          [x] IADLs         [x] Strength      [x] ROM          [x] Pain            [x] Sensation     [x]Fine Motor Coordination     [x] Edema         [x] Scar Adhesion/Skin Integrity   [x] Motor Endurance      OT PLAN OF CARE

## 2025-07-03 ENCOUNTER — HOSPITAL ENCOUNTER (OUTPATIENT)
Dept: OCCUPATIONAL THERAPY | Age: 35
Setting detail: THERAPIES SERIES
Discharge: HOME | End: 2025-07-03
Payer: COMMERCIAL

## 2025-07-03 PROCEDURE — 97110 THERAPEUTIC EXERCISES: CPT

## 2025-07-03 NOTE — PROGRESS NOTES
OCCUPATIONAL THERAPY DAILY TREATMENT NOTE  Y [facility]  [facility]  [address] [address]  Dept: [phone]  Loc: [phone]   [facility] OT Fax: [phone]    Date:  7/3/2025   Initial Evaluation Date: 2025                              Evaluating Therapist: Monika Silva OT     Patient Name:  Cheikh Freedman                  :  1990     Restrictions/Precautions:  Per DRUJ Instability Stabilization protocol (no rotation for 4-6 weeks), low fall risk  Diagnosis:  DRUJ Instability Stabilization and Distal Radius Bone Grafting  S52.501A (ICD-10-CM) - Unspecified fracture of the lower end of right radius, initial encounter for closed fracture   S52.502A (ICD-10-CM) - Unspecified fracture of the lower end of left radius, initial encounter for closed fracture  S52.601A (ICD-10-CM) - Unspecified fracture of lower end of right ulna, initial encounter for closed fracture   S52.602A (ICD-10-CM) - Unspecified fracture of lower end of left ulna, initial encounter for closed fracture   M25.339 (ICD-10-CM) - Other instability, unspecified wrist     Date of Surgery/Injury: 3/20/2025 sx     Insurance/Certification information: [facility] - Auth#: [id number], 2nd Auth #: [id number]  Plan of care signed (Y/N): Y - signed electronically  Visit# / total visits: 3/ 6 - Authorized until  (13 already completed)     Referring Practitioner:  Dr. Tavon Lucero DO  Specific Practitioner Orders: Splint needed: [place] splint. Patient is okay for flexion/extension of the wrist, limit supination/pronation of the wrist for 4-6 weeks. PROM, AAROM, AROM, stretching, scar management, strengthening, and modalities PRN     Assessment of current deficits   [x] ADLs          [x] IADLs         [x] Strength      [x] ROM          [x] Pain            [x] Sensation     [x]Fine Motor Coordination     [x] Edema         [x] Scar Adhesion/Skin Integrity   [x] Motor Endurance      OT PLAN OF CARE   OT

## 2025-07-08 ENCOUNTER — HOSPITAL ENCOUNTER (OUTPATIENT)
Dept: OCCUPATIONAL THERAPY | Age: 35
Setting detail: THERAPIES SERIES
Discharge: HOME | End: 2025-07-08
Payer: COMMERCIAL

## 2025-07-08 PROCEDURE — 97110 THERAPEUTIC EXERCISES: CPT

## 2025-07-08 NOTE — PROGRESS NOTES
OCCUPATIONAL THERAPY DAILY TREATMENT NOTE  Y [facility]  [facility]  [address] [address]  Dept: [phone]  Loc: [phone]   [facility] OT Fax: [phone]    Date:  2025   Initial Evaluation Date: 2025                              Evaluating Therapist: Monika Silva OT     Patient Name:  Cheikh Freedman                  :  1990     Restrictions/Precautions:  Per DRUJ Instability Stabilization protocol (no rotation for 4-6 weeks), low fall risk  Diagnosis:  DRUJ Instability Stabilization and Distal Radius Bone Grafting  S52.501A (ICD-10-CM) - Unspecified fracture of the lower end of right radius, initial encounter for closed fracture   S52.502A (ICD-10-CM) - Unspecified fracture of the lower end of left radius, initial encounter for closed fracture  S52.601A (ICD-10-CM) - Unspecified fracture of lower end of right ulna, initial encounter for closed fracture   S52.602A (ICD-10-CM) - Unspecified fracture of lower end of left ulna, initial encounter for closed fracture   M25.339 (ICD-10-CM) - Other instability, unspecified wrist     Date of Surgery/Injury: 3/20/2025 sx     Insurance/Certification information: [facility] - Auth#: [id number], 2nd Auth #: [id number]  Plan of care signed (Y/N): Y - signed electronically  Visit# / total visits:  - Authorized until  (13 already completed)     Referring Practitioner:  Dr. Tavon Lucero DO  Specific Practitioner Orders: Splint needed: [place] splint. Patient is okay for flexion/extension of the wrist, limit supination/pronation of the wrist for 4-6 weeks. PROM, AAROM, AROM, stretching, scar management, strengthening, and modalities PRN     Assessment of current deficits   [x] ADLs          [x] IADLs         [x] Strength      [x] ROM          [x] Pain            [x] Sensation     [x]Fine Motor Coordination     [x] Edema         [x] Scar Adhesion/Skin Integrity   [x] Motor Endurance      OT PLAN OF CARE   OT

## 2025-07-10 ENCOUNTER — HOSPITAL ENCOUNTER (OUTPATIENT)
Dept: OCCUPATIONAL THERAPY | Age: 35
Setting detail: THERAPIES SERIES
Discharge: HOME | End: 2025-07-10
Payer: COMMERCIAL

## 2025-07-10 PROCEDURE — 97110 THERAPEUTIC EXERCISES: CPT

## 2025-07-10 NOTE — PROGRESS NOTES
OCCUPATIONAL THERAPY DAILY TREATMENT NOTE  Y [facility]  [facility]  [address] [facility] [address]  Dept: [phone]  Loc: [phone]   [facility] OT Fax: [phone]    Date:  7/10/2025   Initial Evaluation Date: 2025                              Evaluating Therapist: Monika Silva OT     Patient Name:  Cheikh Freedman                  :  1990     Restrictions/Precautions:  Per DRUJ Instability Stabilization protocol (no rotation for 4-6 weeks), low fall risk  Diagnosis:  DRUJ Instability Stabilization and Distal Radius Bone Grafting  S52.501A (ICD-10-CM) - Unspecified fracture of the lower end of right radius, initial encounter for closed fracture   S52.502A (ICD-10-CM) - Unspecified fracture of the lower end of left radius, initial encounter for closed fracture  S52.601A (ICD-10-CM) - Unspecified fracture of lower end of right ulna, initial encounter for closed fracture   S52.602A (ICD-10-CM) - Unspecified fracture of lower end of left ulna, initial encounter for closed fracture   M25.339 (ICD-10-CM) - Other instability, unspecified wrist     Date of Surgery/Injury: 3/20/2025 sx     Insurance/Certification information: [facility] - Auth#: [id number], 2nd Auth #: [id number]  Plan of care signed (Y/N): Y - signed electronically  Visit# / total visits: 5/6 of the wrist for 4-6 weeks. PROM, AAROM, AROM, stretching, scar management, strengthening, and modalities PRN     Assessment of current deficits   [x] ADLs          [x] IADLs         [x] Strength      [x] ROM          [x] Pain            [x] Sensation     [x]Fine Motor Coordination     [x] Edema         [x] Scar Adhesion/Skin Integrity   [x] Motor Endurance      OT PLAN OF CARE   OT POC based on physician orders, patient diagnosis and results of clinical assessment.     Frequency/Duration: 1-2x/wk for 18 visits  /  Certification Period From: 2025  To: 2025     Specific OT Treatment to include:   [x]

## 2025-07-15 ENCOUNTER — HOSPITAL ENCOUNTER (OUTPATIENT)
Dept: OCCUPATIONAL THERAPY | Age: 35
Setting detail: THERAPIES SERIES
Discharge: HOME | End: 2025-07-15
Payer: COMMERCIAL

## 2025-07-15 PROCEDURE — 97110 THERAPEUTIC EXERCISES: CPT | Performed by: OCCUPATIONAL THERAPIST

## 2025-07-15 NOTE — PROGRESS NOTES
OCCUPATIONAL THERAPY DAILY TREATMENT NOTE/DISCHARGE  Y  [facility]  [address] [facility] [address]  Dept: [phone]  Loc: [phone]   [facility] OT Fax: [phone]    Date:  7/15/2025   Initial Evaluation Date: 2025                              Evaluating Therapist: Monika Silva OT     Patient Name:  Cheikh Freedman                  :  1990     Restrictions/Precautions:  Per DRUJ Instability Stabilization protocol (no rotation for 4-6 weeks), low fall risk  Diagnosis:  DRUJ Instability Stabilization and Distal Radius Bone Grafting  S52.501A (ICD-10-CM) - Unspecified fracture of the lower end of right radius, initial encounter for closed fracture   S52.502A (ICD-10-CM) - Unspecified fracture of the lower end of left radius, initial encounter for closed fracture  S52.601A (ICD-10-CM) - Unspecified fracture of lower end of right ulna, initial encounter for closed fracture   S52.602A (ICD-10-CM) - Unspecified fracture of lower end of left ulna, initial encounter for closed fracture   M25.339 (ICD-10-CM) - Other instability, unspecified wrist     Date of Surgery/Injury: 3/20/2025 sx     Insurance/Certification information: [facility] - Auth#: [id number], 2nd Auth #: [id number]  Plan of care signed (Y/N): Y - signed electronically  Visit# / total visits:  - Authorized until  (13 already completed)     Referring Practitioner:  Dr. Tavon Lucero DO  Specific Practitioner Orders: Splint needed: Cope splint. Patient is okay for flexion/extension of the wrist, limit supination/pronation of the wrist for 4-6 weeks. PROM, AAROM, AROM, stretching, scar management, strengthening, and modalities PRN     Assessment of current deficits   [x] ADLs          [x] IADLs         [x] Strength      [x] ROM          [x] Pain            [x] Sensation     [x]Fine Motor Coordination     [x] Edema         [x] Scar Adhesion/Skin Integrity   [x] Motor Endurance      OT PLAN OF

## 2025-08-01 ENCOUNTER — HOSPITAL ENCOUNTER (OUTPATIENT)
Dept: ULTRASOUND IMAGING | Age: 35
Discharge: HOME | End: 2025-08-01
Payer: COMMERCIAL

## 2025-08-01 DIAGNOSIS — R10.9 ABDOMINAL PAIN, UNSPECIFIED ABDOMINAL LOCATION: ICD-10-CM

## 2025-08-01 PROCEDURE — 76705 ECHO EXAM OF ABDOMEN: CPT

## (undated) DEVICE — DOUBLE BASIN SET: Brand: MEDLINE INDUSTRIES, INC.

## (undated) DEVICE — STRAP POS MP 30X3 IN HK LOOP CLOSURE FOAM DISP

## (undated) DEVICE — 4-PORT MANIFOLD: Brand: NEPTUNE 2

## (undated) DEVICE — BANDAGE COMPR W4INXL10YD WHITE/BEIGE E MTRX HK LOOP CLSR

## (undated) DEVICE — DRAPE,REIN 53X77,STERILE: Brand: MEDLINE

## (undated) DEVICE — Y-TYPE TUR/BLADDER IRRIGATION SET, REGULATING CLAMP

## (undated) DEVICE — BIT DRL L96MM DIA1.5MM MINI QUIK CPL CALIB W/O STP REUSE

## (undated) DEVICE — PAD,ABDOMINAL,5"X9",ST,LF,25/BX: Brand: MEDLINE INDUSTRIES, INC.

## (undated) DEVICE — SCREW BNE L28MM DIA2MM CORT S STL ST LOK FULL THRD T6
Type: IMPLANTABLE DEVICE | Site: OLECRANON | Status: NON-FUNCTIONAL
Removed: 2024-08-29

## (undated) DEVICE — SUTURE FIBERLOOP 4-0 L10IN NONABSORBABLE BLU L17.9MM 3/8 AR722920

## (undated) DEVICE — GLOVE ORANGE PI 8   MSG9080

## (undated) DEVICE — TOWEL,OR,DSP,ST,BLUE,DLX,10/PK,8PK/CS: Brand: MEDLINE

## (undated) DEVICE — BIT DRL DIA1.7MM LNG FT ANK FOR COMPHSVE SYS

## (undated) DEVICE — SCREW BNE L38MM DIA2.7MM S STL ST VAR ANG LOK FULL THRD T8
Type: IMPLANTABLE DEVICE | Site: OLECRANON | Status: NON-FUNCTIONAL
Removed: 2024-08-29

## (undated) DEVICE — PADDING,UNDERCAST,COTTON, 4"X4YD STERILE: Brand: MEDLINE

## (undated) DEVICE — SPONGE LAP W18XL18IN WHT COT 4 PLY FLD STRUNG RADPQ DISP ST 2 PER PACK

## (undated) DEVICE — SYRINGE MED 10ML TRNSLUC BRL PLUNG BLK MRK POLYPR CTRL

## (undated) DEVICE — SOLUTION SCRB 4OZ 4% CHG H2O AIDED FOR PREOPERATIVE SKIN

## (undated) DEVICE — BIT DRL L110MM DIA2.5MM ST G QUIK CPL NONRADIOPAQUE W/O STP

## (undated) DEVICE — SLING ARM L L165IN D75IN WHT POLY MESH ENVELOP MTL SIDE

## (undated) DEVICE — 3M™ IOBAN™ 2 ANTIMICROBIAL INCISE DRAPE 6640EZ: Brand: IOBAN™ 2

## (undated) DEVICE — Device

## (undated) DEVICE — GUIDEWIRE ORTH L150MM DIA0.053IN W/ TRCR TIP FOR ANK FRAC

## (undated) DEVICE — NEEDLE HYPO 21GA L1.5IN GRN POLYPR HUB S STL REG BVL STR

## (undated) DEVICE — ELECTRODE PT RET AD L9FT HI MOIST COND ADH HYDRGEL CORDED

## (undated) DEVICE — 3M™ COBAN™ NL STERILE NON-LATEX SELF-ADHERENT WRAP, 2084S, 4 IN X 5 YD (10 CM X 4,5 M), 18 ROLLS/CASE: Brand: 3M™ COBAN™

## (undated) DEVICE — DRESSING,GAUZE,XEROFORM,CURAD,5"X9",ST: Brand: CURAD

## (undated) DEVICE — SCREW BNE L40MM DIA2.7MM ANK S STL ST VAR ANG LOK FULL THRD
Type: IMPLANTABLE DEVICE | Site: OLECRANON | Status: NON-FUNCTIONAL
Removed: 2024-08-29

## (undated) DEVICE — ELECTRODE ES L3IN S STL BLDE INSUL DISP VALLEYLAB EDGE

## (undated) DEVICE — PADDING CAST W6INXL4YD COT LO LINTING WYTEX

## (undated) DEVICE — ZIMMER® STERILE DISPOSABLE TOURNIQUET CUFF WITH PROTECTIVE SLEEVE AND PLC, DUAL PORT, SINGLE BLADDER, 18 IN. (46 CM)

## (undated) DEVICE — PADDING,UNDERCAST,COTTON, 3X4YD STERILE: Brand: MEDLINE

## (undated) DEVICE — GOWN,AURORA,BRTHSLV,2XL,18/CS: Brand: MEDLINE

## (undated) DEVICE — DRAPE EQUIP CARM 72X42 IN RUBBER BND CLP

## (undated) DEVICE — SOLUTION IRRIG 3000ML 0.9% SOD CHL USP UROMATIC PLAS CONT

## (undated) DEVICE — SURGICAL PROCEDURE PACK HND

## (undated) DEVICE — BIT DRL DIA2.5MM GRAD FOR 3.5MM LOK CORT SCR

## (undated) DEVICE — GLOVE ORTHO 8   MSG9480

## (undated) DEVICE — NEPTUNE E-SEP SMOKE EVACUATION PENCIL, COATED, 70MM BLADE, PUSH BUTTON SWITCH: Brand: NEPTUNE E-SEP

## (undated) DEVICE — GAUZE,SPONGE,AVANT,4"X4",4PLY,STRL,10/TR: Brand: MEDLINE

## (undated) DEVICE — DRAPE,HAND,STERILE: Brand: MEDLINE

## (undated) DEVICE — BIT DRL L140MM DIA2MM QUIK CPL 3 FLUT CALIB DEPTH MRK W/O

## (undated) DEVICE — UPPER EXTREMITY: Brand: MEDLINE INDUSTRIES, INC.

## (undated) DEVICE — BNDG,ELSTC,MATRIX,STRL,3"X5YD,LF,HOOK&LP: Brand: MEDLINE